# Patient Record
Sex: MALE | Race: WHITE | NOT HISPANIC OR LATINO | Employment: OTHER | URBAN - METROPOLITAN AREA
[De-identification: names, ages, dates, MRNs, and addresses within clinical notes are randomized per-mention and may not be internally consistent; named-entity substitution may affect disease eponyms.]

---

## 2023-03-01 LAB
INR IN PPP BY COAGULATION ASSAY: 2.9 (ref 0.9–1.1)
PROTHROMBIN TIME (PT) IN PPP BY COAGULATION ASSAY: 34.3 SEC (ref 9.8–13.4)

## 2023-04-20 LAB
ALBUMIN (G/DL) IN SER/PLAS: 3.8 G/DL (ref 3.4–5)
ANION GAP IN SER/PLAS: 11 MMOL/L (ref 10–20)
CALCIUM (MG/DL) IN SER/PLAS: 9 MG/DL (ref 8.6–10.3)
CARBON DIOXIDE, TOTAL (MMOL/L) IN SER/PLAS: 29 MMOL/L (ref 21–32)
CHLORIDE (MMOL/L) IN SER/PLAS: 103 MMOL/L (ref 98–107)
CREATININE (MG/DL) IN SER/PLAS: 1.12 MG/DL (ref 0.5–1.3)
FERRITIN (UG/LL) IN SER/PLAS: 20 UG/L (ref 20–300)
GFR MALE: 65 ML/MIN/1.73M2
GLUCOSE (MG/DL) IN SER/PLAS: 309 MG/DL (ref 74–99)
IRON (UG/DL) IN SER/PLAS: 82 UG/DL (ref 35–150)
IRON BINDING CAPACITY (UG/DL) IN SER/PLAS: 372 UG/DL (ref 240–445)
IRON SATURATION (%) IN SER/PLAS: 22 % (ref 25–45)
MAGNESIUM (MG/DL) IN SER/PLAS: 1.57 MG/DL (ref 1.6–2.4)
PARATHYRIN INTACT (PG/ML) IN SER/PLAS: 27.2 PG/ML (ref 18.5–88)
PHOSPHATE (MG/DL) IN SER/PLAS: 3.5 MG/DL (ref 2.5–4.9)
POTASSIUM (MMOL/L) IN SER/PLAS: 4.8 MMOL/L (ref 3.5–5.3)
SODIUM (MMOL/L) IN SER/PLAS: 138 MMOL/L (ref 136–145)
UREA NITROGEN (MG/DL) IN SER/PLAS: 22 MG/DL (ref 6–23)

## 2023-06-01 PROBLEM — K52.9 CHRONIC DIARRHEA OF UNKNOWN ORIGIN: Status: ACTIVE | Noted: 2023-06-01

## 2023-06-01 PROBLEM — E78.5 HYPERLIPIDEMIA: Status: ACTIVE | Noted: 2023-06-01

## 2023-06-01 PROBLEM — R21 SCROTAL RASH: Status: RESOLVED | Noted: 2023-06-01 | Resolved: 2023-06-01

## 2023-06-01 PROBLEM — I10 BENIGN ESSENTIAL HYPERTENSION: Status: ACTIVE | Noted: 2023-06-01

## 2023-06-01 PROBLEM — R63.4 WEIGHT LOSS, UNINTENTIONAL: Status: RESOLVED | Noted: 2023-06-01 | Resolved: 2023-06-01

## 2023-06-01 PROBLEM — E61.2 MAGNESIUM DEFICIENCY: Status: ACTIVE | Noted: 2023-06-01

## 2023-06-01 PROBLEM — H11.30 SUBCONJUNCTIVAL HEMORRHAGE: Status: RESOLVED | Noted: 2023-06-01 | Resolved: 2023-06-01

## 2023-06-01 PROBLEM — R42 LIGHTHEADEDNESS: Status: RESOLVED | Noted: 2023-06-01 | Resolved: 2023-06-01

## 2023-06-01 PROBLEM — I48.19 PERSISTENT ATRIAL FIBRILLATION (MULTI): Status: ACTIVE | Noted: 2023-06-01

## 2023-06-01 PROBLEM — N39.0 UTI (URINARY TRACT INFECTION): Status: RESOLVED | Noted: 2023-06-01 | Resolved: 2023-06-01

## 2023-06-01 PROBLEM — Q45.3 ABNORMALITY OF PANCREATIC DUCT: Status: ACTIVE | Noted: 2023-06-01

## 2023-06-01 PROBLEM — I44.2: Status: ACTIVE | Noted: 2023-06-01

## 2023-06-01 PROBLEM — E55.9 VITAMIN D DEFICIENCY: Status: ACTIVE | Noted: 2023-06-01

## 2023-06-01 PROBLEM — R94.31 ABNORMAL EKG: Status: ACTIVE | Noted: 2023-06-01

## 2023-06-01 PROBLEM — R07.9 CHEST PAIN: Status: RESOLVED | Noted: 2023-06-01 | Resolved: 2023-06-01

## 2023-06-01 PROBLEM — D64.9 ANEMIA: Status: ACTIVE | Noted: 2023-06-01

## 2023-06-01 PROBLEM — N35.919 URETHRAL STRICTURE: Status: ACTIVE | Noted: 2023-06-01

## 2023-06-01 PROBLEM — I44.1 WENCKEBACH: Status: ACTIVE | Noted: 2023-06-01

## 2023-06-01 PROBLEM — N40.0 BPH (BENIGN PROSTATIC HYPERPLASIA): Status: ACTIVE | Noted: 2023-06-01

## 2023-06-01 PROBLEM — I48.0 PAROXYSMAL ATRIAL FIBRILLATION (MULTI): Status: ACTIVE | Noted: 2023-06-01

## 2023-06-01 PROBLEM — R00.1 BRADYCARDIA: Status: ACTIVE | Noted: 2023-06-01

## 2023-06-01 LAB
ALANINE AMINOTRANSFERASE (SGPT) (U/L) IN SER/PLAS: 23 U/L (ref 10–52)
ALBUMIN (G/DL) IN SER/PLAS: 4.2 G/DL (ref 3.4–5)
ALKALINE PHOSPHATASE (U/L) IN SER/PLAS: 40 U/L (ref 33–136)
ANION GAP IN SER/PLAS: 13 MMOL/L (ref 10–20)
ASPARTATE AMINOTRANSFERASE (SGOT) (U/L) IN SER/PLAS: 22 U/L (ref 9–39)
BASOPHILS (10*3/UL) IN BLOOD BY AUTOMATED COUNT: 0.06 X10E9/L (ref 0–0.1)
BASOPHILS/100 LEUKOCYTES IN BLOOD BY AUTOMATED COUNT: 0.7 % (ref 0–2)
BILIRUBIN TOTAL (MG/DL) IN SER/PLAS: 0.7 MG/DL (ref 0–1.2)
CALCIDIOL (25 OH VITAMIN D3) (NG/ML) IN SER/PLAS: 90 NG/ML
CALCIUM (MG/DL) IN SER/PLAS: 9.4 MG/DL (ref 8.6–10.3)
CARBON DIOXIDE, TOTAL (MMOL/L) IN SER/PLAS: 28 MMOL/L (ref 21–32)
CHLORIDE (MMOL/L) IN SER/PLAS: 102 MMOL/L (ref 98–107)
CHOLESTEROL (MG/DL) IN SER/PLAS: 114 MG/DL (ref 0–199)
CHOLESTEROL IN HDL (MG/DL) IN SER/PLAS: 62.2 MG/DL
CHOLESTEROL/HDL RATIO: 1.8
CREATININE (MG/DL) IN SER/PLAS: 1.36 MG/DL (ref 0.5–1.3)
EOSINOPHILS (10*3/UL) IN BLOOD BY AUTOMATED COUNT: 0.22 X10E9/L (ref 0–0.4)
EOSINOPHILS/100 LEUKOCYTES IN BLOOD BY AUTOMATED COUNT: 2.6 % (ref 0–6)
ERYTHROCYTE DISTRIBUTION WIDTH (RATIO) BY AUTOMATED COUNT: 13.9 % (ref 11.5–14.5)
ERYTHROCYTE MEAN CORPUSCULAR HEMOGLOBIN CONCENTRATION (G/DL) BY AUTOMATED: 31.2 G/DL (ref 32–36)
ERYTHROCYTE MEAN CORPUSCULAR VOLUME (FL) BY AUTOMATED COUNT: 93 FL (ref 80–100)
ERYTHROCYTES (10*6/UL) IN BLOOD BY AUTOMATED COUNT: 4.46 X10E12/L (ref 4.5–5.9)
ESTIMATED AVERAGE GLUCOSE FOR HBA1C: 169 MG/DL
GFR MALE: 51 ML/MIN/1.73M2
GLUCOSE (MG/DL) IN SER/PLAS: 117 MG/DL (ref 74–99)
HEMATOCRIT (%) IN BLOOD BY AUTOMATED COUNT: 41.3 % (ref 41–52)
HEMOGLOBIN (G/DL) IN BLOOD: 12.9 G/DL (ref 13.5–17.5)
HEMOGLOBIN A1C/HEMOGLOBIN TOTAL IN BLOOD: 7.5 %
IMMATURE GRANULOCYTES/100 LEUKOCYTES IN BLOOD BY AUTOMATED COUNT: 0.4 % (ref 0–0.9)
LDL: 31 MG/DL (ref 0–99)
LEUKOCYTES (10*3/UL) IN BLOOD BY AUTOMATED COUNT: 8.5 X10E9/L (ref 4.4–11.3)
LYMPHOCYTES (10*3/UL) IN BLOOD BY AUTOMATED COUNT: 1.95 X10E9/L (ref 0.8–3)
LYMPHOCYTES/100 LEUKOCYTES IN BLOOD BY AUTOMATED COUNT: 23 % (ref 13–44)
MAGNESIUM (MG/DL) IN SER/PLAS: 2.06 MG/DL (ref 1.6–2.4)
MONOCYTES (10*3/UL) IN BLOOD BY AUTOMATED COUNT: 0.86 X10E9/L (ref 0.05–0.8)
MONOCYTES/100 LEUKOCYTES IN BLOOD BY AUTOMATED COUNT: 10.1 % (ref 2–10)
NEUTROPHILS (10*3/UL) IN BLOOD BY AUTOMATED COUNT: 5.36 X10E9/L (ref 1.6–5.5)
NEUTROPHILS/100 LEUKOCYTES IN BLOOD BY AUTOMATED COUNT: 63.2 % (ref 40–80)
PLATELETS (10*3/UL) IN BLOOD AUTOMATED COUNT: 248 X10E9/L (ref 150–450)
POTASSIUM (MMOL/L) IN SER/PLAS: 4.8 MMOL/L (ref 3.5–5.3)
PROTEIN TOTAL: 6.8 G/DL (ref 6.4–8.2)
SODIUM (MMOL/L) IN SER/PLAS: 138 MMOL/L (ref 136–145)
TRIGLYCERIDE (MG/DL) IN SER/PLAS: 105 MG/DL (ref 0–149)
UREA NITROGEN (MG/DL) IN SER/PLAS: 27 MG/DL (ref 6–23)
VLDL: 21 MG/DL (ref 0–40)

## 2023-06-01 RX ORDER — AMLODIPINE BESYLATE 2.5 MG/1
1 TABLET ORAL DAILY
COMMUNITY
Start: 2018-11-29

## 2023-06-01 RX ORDER — UBIDECARENONE 75 MG
1 CAPSULE ORAL DAILY
COMMUNITY
Start: 2017-11-08

## 2023-06-01 RX ORDER — TRIAMCINOLONE ACETONIDE 1 MG/G
1 CREAM TOPICAL 2 TIMES DAILY
COMMUNITY
Start: 2023-03-16

## 2023-06-01 RX ORDER — WARFARIN SODIUM 5 MG/1
5 TABLET ORAL NIGHTLY
COMMUNITY
Start: 2015-06-22 | End: 2023-11-30

## 2023-06-01 RX ORDER — CHOLECALCIFEROL (VITAMIN D3) 25 MCG
3 TABLET ORAL DAILY
COMMUNITY
Start: 2018-11-29

## 2023-06-01 RX ORDER — PEN NEEDLE, DIABETIC 30 GX3/16"
1 NEEDLE, DISPOSABLE MISCELLANEOUS 2 TIMES DAILY
COMMUNITY

## 2023-06-01 RX ORDER — LANOLIN ALCOHOL/MO/W.PET/CERES
1 CREAM (GRAM) TOPICAL DAILY
COMMUNITY
Start: 2018-01-08 | End: 2024-02-26 | Stop reason: DRUGHIGH

## 2023-06-01 RX ORDER — LOSARTAN POTASSIUM 100 MG/1
1 TABLET ORAL DAILY
COMMUNITY
Start: 2015-11-05 | End: 2023-10-04 | Stop reason: SDUPTHER

## 2023-06-01 RX ORDER — NYSTATIN 100000 U/G
CREAM TOPICAL 2 TIMES DAILY
COMMUNITY
Start: 2023-03-16

## 2023-06-01 RX ORDER — METFORMIN HYDROCHLORIDE 500 MG/1
1 TABLET ORAL 4 TIMES DAILY
COMMUNITY
Start: 2015-11-05 | End: 2024-02-14 | Stop reason: SDUPTHER

## 2023-06-01 RX ORDER — ACETAMINOPHEN 500 MG
500 TABLET ORAL 4 TIMES DAILY
COMMUNITY
Start: 2016-11-02

## 2023-06-01 RX ORDER — INSULIN LISPRO 100 [IU]/ML
INJECTION, SUSPENSION SUBCUTANEOUS
COMMUNITY
Start: 2016-10-18 | End: 2024-04-16 | Stop reason: SDUPTHER

## 2023-06-01 RX ORDER — AMILORIDE HYDROCHLORIDE 5 MG/1
1 TABLET ORAL DAILY
COMMUNITY
Start: 2018-11-29

## 2023-06-01 RX ORDER — ROSUVASTATIN CALCIUM 5 MG/1
1 TABLET, COATED ORAL DAILY
COMMUNITY
Start: 2023-05-19 | End: 2024-02-21

## 2023-06-01 RX ORDER — BLOOD SUGAR DIAGNOSTIC
1 STRIP MISCELLANEOUS 4 TIMES DAILY
COMMUNITY
Start: 2018-11-25 | End: 2024-02-08 | Stop reason: SDUPTHER

## 2023-06-05 ENCOUNTER — OFFICE VISIT (OUTPATIENT)
Dept: PRIMARY CARE | Facility: CLINIC | Age: 83
End: 2023-06-05
Payer: MEDICARE

## 2023-06-05 VITALS
HEIGHT: 72 IN | RESPIRATION RATE: 16 BRPM | HEART RATE: 69 BPM | WEIGHT: 171.6 LBS | DIASTOLIC BLOOD PRESSURE: 60 MMHG | OXYGEN SATURATION: 96 % | SYSTOLIC BLOOD PRESSURE: 120 MMHG | BODY MASS INDEX: 23.24 KG/M2

## 2023-06-05 DIAGNOSIS — Z23 NEED FOR TDAP VACCINATION: ICD-10-CM

## 2023-06-05 DIAGNOSIS — I48.19 PERSISTENT ATRIAL FIBRILLATION (MULTI): ICD-10-CM

## 2023-06-05 DIAGNOSIS — H93.92 LESION OF LEFT EAR: ICD-10-CM

## 2023-06-05 DIAGNOSIS — Z00.00 ROUTINE GENERAL MEDICAL EXAMINATION AT HEALTH CARE FACILITY: Primary | ICD-10-CM

## 2023-06-05 DIAGNOSIS — Z79.4 TYPE 2 DIABETES MELLITUS WITHOUT COMPLICATION, WITH LONG-TERM CURRENT USE OF INSULIN (MULTI): ICD-10-CM

## 2023-06-05 DIAGNOSIS — E11.9 TYPE 2 DIABETES MELLITUS WITHOUT COMPLICATION, WITH LONG-TERM CURRENT USE OF INSULIN (MULTI): ICD-10-CM

## 2023-06-05 DIAGNOSIS — B35.1 TOENAIL FUNGUS: ICD-10-CM

## 2023-06-05 PROCEDURE — 90715 TDAP VACCINE 7 YRS/> IM: CPT | Performed by: STUDENT IN AN ORGANIZED HEALTH CARE EDUCATION/TRAINING PROGRAM

## 2023-06-05 PROCEDURE — 3078F DIAST BP <80 MM HG: CPT | Performed by: STUDENT IN AN ORGANIZED HEALTH CARE EDUCATION/TRAINING PROGRAM

## 2023-06-05 PROCEDURE — 1170F FXNL STATUS ASSESSED: CPT | Performed by: STUDENT IN AN ORGANIZED HEALTH CARE EDUCATION/TRAINING PROGRAM

## 2023-06-05 PROCEDURE — 90471 IMMUNIZATION ADMIN: CPT | Performed by: STUDENT IN AN ORGANIZED HEALTH CARE EDUCATION/TRAINING PROGRAM

## 2023-06-05 PROCEDURE — 1159F MED LIST DOCD IN RCRD: CPT | Performed by: STUDENT IN AN ORGANIZED HEALTH CARE EDUCATION/TRAINING PROGRAM

## 2023-06-05 PROCEDURE — 99213 OFFICE O/P EST LOW 20 MIN: CPT | Performed by: STUDENT IN AN ORGANIZED HEALTH CARE EDUCATION/TRAINING PROGRAM

## 2023-06-05 PROCEDURE — 1160F RVW MEDS BY RX/DR IN RCRD: CPT | Performed by: STUDENT IN AN ORGANIZED HEALTH CARE EDUCATION/TRAINING PROGRAM

## 2023-06-05 PROCEDURE — 1036F TOBACCO NON-USER: CPT | Performed by: STUDENT IN AN ORGANIZED HEALTH CARE EDUCATION/TRAINING PROGRAM

## 2023-06-05 PROCEDURE — 1157F ADVNC CARE PLAN IN RCRD: CPT | Performed by: STUDENT IN AN ORGANIZED HEALTH CARE EDUCATION/TRAINING PROGRAM

## 2023-06-05 PROCEDURE — 3074F SYST BP LT 130 MM HG: CPT | Performed by: STUDENT IN AN ORGANIZED HEALTH CARE EDUCATION/TRAINING PROGRAM

## 2023-06-05 PROCEDURE — G0438 PPPS, INITIAL VISIT: HCPCS | Performed by: STUDENT IN AN ORGANIZED HEALTH CARE EDUCATION/TRAINING PROGRAM

## 2023-06-05 RX ORDER — EMPAGLIFLOZIN 10 MG/1
10 TABLET, FILM COATED ORAL DAILY
COMMUNITY
Start: 2023-05-29

## 2023-06-05 ASSESSMENT — ENCOUNTER SYMPTOMS
HEMATURIA: 0
DEPRESSION: 0
LOSS OF SENSATION IN FEET: 0
WEAKNESS: 0
DIZZINESS: 0
CHILLS: 0
HEADACHES: 0
POLYPHAGIA: 0
ABDOMINAL PAIN: 0
WOUND: 0
BLOOD IN STOOL: 0
NAUSEA: 0
POLYDIPSIA: 0
FATIGUE: 0
ABDOMINAL DISTENTION: 0
TROUBLE SWALLOWING: 0
SLEEP DISTURBANCE: 0
SINUS PAIN: 0
EYE DISCHARGE: 0
OCCASIONAL FEELINGS OF UNSTEADINESS: 0
SHORTNESS OF BREATH: 0
FEVER: 0
CONFUSION: 0
NERVOUS/ANXIOUS: 0
COUGH: 0
CONSTIPATION: 0
WHEEZING: 0
ACTIVITY CHANGE: 0

## 2023-06-05 ASSESSMENT — PATIENT HEALTH QUESTIONNAIRE - PHQ9
3. TROUBLE FALLING OR STAYING ASLEEP OR SLEEPING TOO MUCH: NOT AT ALL
5. POOR APPETITE OR OVEREATING: NOT AT ALL
SUM OF ALL RESPONSES TO PHQ9 QUESTIONS 1 AND 2: 0
7. TROUBLE CONCENTRATING ON THINGS, SUCH AS READING THE NEWSPAPER OR WATCHING TELEVISION: NOT AT ALL
4. FEELING TIRED OR HAVING LITTLE ENERGY: NOT AT ALL
6. FEELING BAD ABOUT YOURSELF - OR THAT YOU ARE A FAILURE OR HAVE LET YOURSELF OR YOUR FAMILY DOWN: NOT AT ALL
2. FEELING DOWN, DEPRESSED OR HOPELESS: NOT AT ALL
SUM OF ALL RESPONSES TO PHQ QUESTIONS 1-9: 0
1. LITTLE INTEREST OR PLEASURE IN DOING THINGS: NOT AT ALL
8. MOVING OR SPEAKING SO SLOWLY THAT OTHER PEOPLE COULD HAVE NOTICED. OR THE OPPOSITE, BEING SO FIGETY OR RESTLESS THAT YOU HAVE BEEN MOVING AROUND A LOT MORE THAN USUAL: NOT AT ALL
9. THOUGHTS THAT YOU WOULD BE BETTER OFF DEAD, OR OF HURTING YOURSELF: NOT AT ALL
10. IF YOU CHECKED OFF ANY PROBLEMS, HOW DIFFICULT HAVE THESE PROBLEMS MADE IT FOR YOU TO DO YOUR WORK, TAKE CARE OF THINGS AT HOME, OR GET ALONG WITH OTHER PEOPLE: NOT DIFFICULT AT ALL

## 2023-06-05 ASSESSMENT — ACTIVITIES OF DAILY LIVING (ADL)
TAKING_MEDICATION: INDEPENDENT
DOING_HOUSEWORK: INDEPENDENT
MANAGING_FINANCES: INDEPENDENT
BATHING: INDEPENDENT
GROCERY_SHOPPING: INDEPENDENT
DRESSING: INDEPENDENT

## 2023-06-05 ASSESSMENT — ANXIETY QUESTIONNAIRES
3. WORRYING TOO MUCH ABOUT DIFFERENT THINGS: NOT AT ALL
4. TROUBLE RELAXING: NOT AT ALL
5. BEING SO RESTLESS THAT IT IS HARD TO SIT STILL: NOT AT ALL
1. FEELING NERVOUS, ANXIOUS, OR ON EDGE: NOT AT ALL
2. NOT BEING ABLE TO STOP OR CONTROL WORRYING: NOT AT ALL
IF YOU CHECKED OFF ANY PROBLEMS ON THIS QUESTIONNAIRE, HOW DIFFICULT HAVE THESE PROBLEMS MADE IT FOR YOU TO DO YOUR WORK, TAKE CARE OF THINGS AT HOME, OR GET ALONG WITH OTHER PEOPLE: NOT DIFFICULT AT ALL
6. BECOMING EASILY ANNOYED OR IRRITABLE: NOT AT ALL
GAD7 TOTAL SCORE: 0
7. FEELING AFRAID AS IF SOMETHING AWFUL MIGHT HAPPEN: NOT AT ALL

## 2023-06-05 NOTE — PATIENT INSTRUCTIONS
It was nice meeting you today.      Exercise helps improve your health: I encourage you to slowly increase your activity level 10 -30 min as tolerated 3-5 times per week.     Diet: You can improve your health with low carbohydrate, low-fat and high-fiber diet.     DASH diet can help improve your blood pressure and overall health.    You can sign up for [employer] to view your result as well     If you need anything or have any questions, please call the clinic at [phone]     Follow up as scheduled

## 2023-06-05 NOTE — PROGRESS NOTES
"Subjective :  Chief Complaint: Jonah Lauren is an 82 y.o. male here for an annual Medicare Wellness visit.    Patient otherwise feels well. No other complaints or concerns.    I have reviewed and reconciled the medication list with the patient today.    Current Outpatient Medications:     acetaminophen (Tylenol) 500 mg tablet, Take 1 tablet (500 mg) by mouth 4 times a day., Disp: , Rfl:     aMILoride (Midamor) 5 mg tablet, Take 1 tablet (5 mg) by mouth once daily., Disp: , Rfl:     amLODIPine (Norvasc) 2.5 mg tablet, Take 1 tablet (2.5 mg) by mouth once daily., Disp: , Rfl:     blood sugar diagnostic (Accu-Chek Barbara Plus test strp) strip, Apply 1 each topically 4 times a day. Patient is to check blood sugar 4 times a day, Disp: , Rfl:     cholecalciferol (Vitamin D-3) 25 MCG (1000 UT) tablet, Take 3 tablets (75 mcg) by mouth once daily., Disp: , Rfl:     cyanocobalamin (Vitamin B-12) 500 mcg tablet, Take 1 tablet (500 mcg) by mouth once daily., Disp: , Rfl:     insulin lispro protamin-lispro (HumaLOG Mix 75-25) 100 unit/mL (75-25) injection, Inject under the skin., Disp: , Rfl:     Jardiance 10 mg, Take 1 tablet (10 mg) by mouth once daily., Disp: , Rfl:     losartan (Cozaar) 100 mg tablet, Take 1 tablet (100 mg) by mouth once daily., Disp: , Rfl:     magnesium oxide (Mag-Ox) 400 mg (241.3 mg magnesium) tablet, Take 1 tablet (400 mg) by mouth once daily., Disp: , Rfl:     metFORMIN (Glucophage) 500 mg tablet, Take 1 tablet (500 mg) by mouth 4 times a day., Disp: , Rfl:     pen needle, diabetic 32 gauge x 5/32\" needle, 1 each 2 times a day., Disp: , Rfl:     rosuvastatin (Crestor) 5 mg tablet, Take 1 tablet (5 mg) by mouth once daily., Disp: , Rfl:     warfarin (Coumadin) 5 mg tablet, Take 1 tablet (5 mg) by mouth once daily at bedtime., Disp: , Rfl:     nystatin (Mycostatin) cream, twice a day. APPLY A THIN LAYER TO AFFECTED AREA(S) AND RUB IN WELL TWICE DAILY., Disp: , Rfl:     triamcinolone (Kenalog) 0.1 % " cream, Apply 1 Application topically 2 times a day., Disp: , Rfl:     The patient's relevant past medical, surgical, family and social history was reviewed in [facility].  All pertinent lab work and results for this visit were reviewed with patient.    Review of Systems   Constitutional:  Negative for activity change, chills, fatigue and fever.   HENT:  Negative for congestion, ear discharge, sinus pain and trouble swallowing.    Eyes:  Negative for discharge and visual disturbance.   Respiratory:  Negative for cough, shortness of breath and wheezing.    Cardiovascular:  Negative for chest pain and leg swelling.   Gastrointestinal:  Negative for abdominal distention, abdominal pain, blood in stool, constipation and nausea.   Endocrine: Negative for polydipsia and polyphagia.   Genitourinary:  Negative for hematuria.   Musculoskeletal:  Negative for gait problem.   Skin:  Negative for wound.   Allergic/Immunologic: Negative for food allergies.   Neurological:  Negative for dizziness, weakness and headaches.   Psychiatric/Behavioral:  Negative for confusion, sleep disturbance and suicidal ideas. The patient is not nervous/anxious.       A complete review of systems was performed and all systems were normal except what is noted in the HPI.      List of current healthcare providers:  Patient Care Team:  Aaron Peralta DO as PCP - General  [facility]eral[facility]        Over the past 2 weeks, how often have you been bothered by any of the following problems?  Little interest or pleasure in doing things: Not at all  Feeling down, depressed, or hopeless: Not at all  Patient Health Questionnaire-2 Score: 0  Over the past 2 weeks, how often have you been bothered by any of the following problems?  Trouble falling or staying asleep, or sleeping too much: Not at all  Feeling tired or having little energy: Not at all  Poor appetite or overeating: Not at all  Feeling bad about yourself - or that you are a  failure or have let yourself or your family down: Not at all  Trouble concentrating on things, such as reading the newspaper or watching television: Not at all  Moving or speaking so slowly that other people could have noticed? Or the opposite - being so fidgety or restless that you have been moving around a lot more than usual.: Not at all  Thoughts that you would be better off dead or hurting yourself in some way: Not at all  Patient Health Questionnaire-9 Score: 0             Objective :  /60 (BP Location: Right arm, Patient Position: Sitting, BP Cuff Size: Adult)   Pulse 69   Resp 16   Ht 1.829 m (6')   Wt 77.8 kg (171 lb 9.6 oz)   SpO2 96%   BMI 23.27 kg/m²    No results found.  Physical Exam  Vitals and nursing note reviewed.   Constitutional:       Appearance: Normal appearance. He is normal weight.   HENT:      Head: Normocephalic and atraumatic.      Right Ear: Tympanic membrane normal.      Left Ear: Tympanic membrane normal.      Ears:      Comments: Left ear lobe lesion     Mouth/Throat:      Mouth: Mucous membranes are dry.   Eyes:      Extraocular Movements: Extraocular movements intact.      Conjunctiva/sclera: Conjunctivae normal.   Cardiovascular:      Rate and Rhythm: Normal rate.      Pulses: Normal pulses.   Pulmonary:      Effort: Pulmonary effort is normal. No respiratory distress.      Breath sounds: Normal breath sounds.   Abdominal:      General: Bowel sounds are normal. There is no distension.      Palpations: Abdomen is soft. There is no mass.   Musculoskeletal:         General: Normal range of motion.      Cervical back: Normal range of motion and neck supple.   Skin:     General: Skin is warm and dry.   Neurological:      General: No focal deficit present.      Mental Status: He is alert. Mental status is at baseline.   Psychiatric:         Mood and Affect: Mood normal.         Assessment/Plan :  Medicare wellness.  Reports issues with weight loss.  Has good appetite and has  access to food.  He is on chronic magnesium oxide.  Has intermittent diarrhea that resolved with Imodium with no issues.  He received tetanus shot today.    DM2.  His A1c was 7.5%.  He is on chronic insulin therapy, also on metformin and was recently started on Jardiance by nephrology.  We discussed that he is on too much antidiabetic medication.  Will refer him to endocrinology for recommendation. Needs to be weaned off insulin    Ear lobe lesion. Referred him dermatology     Toe fungus. Referred to dermatology and podiatry     Problem List Items Addressed This Visit       Persistent atrial fibrillation (CMS/HCC)    Relevant Orders    Follow Up In [facility] - PCP     Other Visit Diagnoses       Routine general medical examination at health care facility    -  Primary    Relevant Orders    Follow Up In [facility] - PCP    Lesion of left ear        Relevant Orders    Referral to Dermatology    Follow Up In [facility] - PCP    Toenail fungus        Relevant Orders    Referral to Dermatology    Referral to Podiatry    Follow Up In [facility] - PCP    Type 2 diabetes mellitus without complication, with long-term current use of insulin (CMS/HCC)        Relevant Orders    Referral to Endocrinology    Follow Up In [facility] - PCP    Need for Tdap vaccination        Relevant Orders    Tdap vaccine, age 10 years and older (BOOSTRIX) (Completed)               The following health maintenance schedule was reviewed with the patient and provided in printed form in the after visit summary:  Health Maintenance   Topic Date Due    Medicare Annual Wellness Visit (AWV)  Never done    Diabetes: Foot Exam  Never done    Diabetes: Retinopathy Screening  Never done    Diabetes: Urine Protein Screening  Never done    Zoster Vaccines (1 of 2) Never done    COVID-19 Vaccine (5 - Booster for Moderna series) 06/07/2022    Diabetes: Hemoglobin A1C  09/01/2023    Influenza Vaccine (Season  Ended) 09/01/2023    Lipid Panel  06/01/2024    DTaP/Tdap/Td Vaccines (2 - Td or Tdap) 06/05/2033    Pneumococcal Vaccine: 65+ Years  Completed    HIB Vaccines  Aged Out    Hepatitis B Vaccines  Aged Out    IPV Vaccines  Aged Out    Hepatitis A Vaccines  Aged Out    Meningococcal Vaccine  Aged Out    Rotavirus Vaccines  Aged Out    HPV Vaccines  Aged Out    Irritable Bowel Syndrome  Discontinued           Patient understands and agrees with treatment plan.          Aaron Peralta, DO

## 2023-09-14 LAB
INR IN PPP BY COAGULATION ASSAY EXTERNAL: 2.6
PROTHROMBIN TIME (PT) IN PPP BY COAGULATION ASSAY EXTERNAL: NORMAL SECONDS

## 2023-10-04 DIAGNOSIS — I10 BENIGN HYPERTENSION: Primary | ICD-10-CM

## 2023-10-04 RX ORDER — LOSARTAN POTASSIUM 100 MG/1
100 TABLET ORAL DAILY
Qty: 90 TABLET | Refills: 3 | Status: SHIPPED | OUTPATIENT
Start: 2023-10-04 | End: 2024-02-26 | Stop reason: SDUPTHER

## 2023-10-12 ENCOUNTER — ANTICOAGULATION - WARFARIN VISIT (OUTPATIENT)
Dept: PHARMACY | Facility: HOSPITAL | Age: 83
End: 2023-10-12
Payer: MEDICARE

## 2023-10-12 DIAGNOSIS — I48.19 PERSISTENT ATRIAL FIBRILLATION (MULTI): ICD-10-CM

## 2023-10-12 DIAGNOSIS — I48.0 PAROXYSMAL ATRIAL FIBRILLATION (MULTI): Primary | ICD-10-CM

## 2023-10-12 LAB
POC INR: 3 (ref 2–3)
POC PROTHROMBIN TIME: 35.7

## 2023-10-12 PROCEDURE — 85610 PROTHROMBIN TIME: CPT

## 2023-10-12 NOTE — PATIENT INSTRUCTIONS
Your INR today is in range at 3.0. However since it is on the high end, will have you decrease your dose to 5 mg every day. We will follow up in 3 weeks. If any questions arise do not hesitate to call us at [phone] M-F from 8:30am-4:30pm or at [phone] after 5pm or on the weekends. Continue to monitor for any excess bleeding or bruising, especially for blood in your stool.

## 2023-10-12 NOTE — PROGRESS NOTES
Mr. Lauren is a referral from Dr. Morgan for warfarin management of Afib (goal 2-3). He denies any changes in health. He denies any missed or extra doses. No signs or symptoms of bleeding reported. He previously was very stable on 7.5 mg Saturday and 5 mg all other days. No changes in his diet or his other medications. Last visit he did bring in a medication that has been using for a few month that he uses for leg cramps. It is [employer] brand Leg Cramp PM, which is a quinine derivative product and it was discussed that this may increase the risk of bleeding with warfarin. Today his INR is 3.0, but since it is on the high end will have him decrease his dose to 5mg daily. We will follow up in 3 weeks.

## 2023-10-24 ENCOUNTER — LAB (OUTPATIENT)
Dept: LAB | Facility: LAB | Age: 83
End: 2023-10-24
Payer: MEDICARE

## 2023-10-24 ENCOUNTER — TELEPHONE (OUTPATIENT)
Dept: PRIMARY CARE | Facility: CLINIC | Age: 83
End: 2023-10-24

## 2023-10-24 DIAGNOSIS — N18.31 CHRONIC KIDNEY DISEASE, STAGE 3A (MULTI): Primary | ICD-10-CM

## 2023-10-24 DIAGNOSIS — N18.31 CHRONIC KIDNEY DISEASE, STAGE 3A (MULTI): ICD-10-CM

## 2023-10-24 DIAGNOSIS — E11.22 TYPE 2 DIABETES MELLITUS WITH DIABETIC CHRONIC KIDNEY DISEASE (MULTI): Primary | ICD-10-CM

## 2023-10-24 LAB
ALBUMIN SERPL BCP-MCNC: 4.1 G/DL (ref 3.4–5)
ANION GAP SERPL CALC-SCNC: 13 MMOL/L (ref 10–20)
APPEARANCE UR: CLEAR
BILIRUB UR STRIP.AUTO-MCNC: NEGATIVE MG/DL
BUN SERPL-MCNC: 25 MG/DL (ref 6–23)
CALCIUM SERPL-MCNC: 8.9 MG/DL (ref 8.6–10.3)
CHLORIDE SERPL-SCNC: 104 MMOL/L (ref 98–107)
CO2 SERPL-SCNC: 27 MMOL/L (ref 21–32)
COLOR UR: YELLOW
CREAT SERPL-MCNC: 1.22 MG/DL (ref 0.5–1.3)
CREAT UR-MCNC: 23.7 MG/DL (ref 20–370)
GFR SERPL CREATININE-BSD FRML MDRD: 58 ML/MIN/1.73M*2
GLUCOSE SERPL-MCNC: 137 MG/DL (ref 74–99)
GLUCOSE UR STRIP.AUTO-MCNC: ABNORMAL MG/DL
KETONES UR STRIP.AUTO-MCNC: NEGATIVE MG/DL
LEUKOCYTE ESTERASE UR QL STRIP.AUTO: NEGATIVE
NITRITE UR QL STRIP.AUTO: NEGATIVE
PH UR STRIP.AUTO: 7 [PH]
PHOSPHATE SERPL-MCNC: 4 MG/DL (ref 2.5–4.9)
POTASSIUM SERPL-SCNC: 5.1 MMOL/L (ref 3.5–5.3)
PROT UR STRIP.AUTO-MCNC: NEGATIVE MG/DL
PROT UR-ACNC: <4 MG/DL (ref 5–25)
PROT/CREAT UR: ABNORMAL MG/G{CREAT}
RBC # UR STRIP.AUTO: NEGATIVE /UL
SODIUM SERPL-SCNC: 139 MMOL/L (ref 136–145)
SP GR UR STRIP.AUTO: 1.01
UROBILINOGEN UR STRIP.AUTO-MCNC: <2 MG/DL

## 2023-10-24 PROCEDURE — 82570 ASSAY OF URINE CREATININE: CPT

## 2023-10-24 PROCEDURE — 80069 RENAL FUNCTION PANEL: CPT

## 2023-10-24 PROCEDURE — 84156 ASSAY OF PROTEIN URINE: CPT

## 2023-10-24 PROCEDURE — 83036 HEMOGLOBIN GLYCOSYLATED A1C: CPT

## 2023-10-24 PROCEDURE — 36415 COLL VENOUS BLD VENIPUNCTURE: CPT

## 2023-10-24 PROCEDURE — 81003 URINALYSIS AUTO W/O SCOPE: CPT

## 2023-10-24 NOTE — TELEPHONE ENCOUNTER
Former [facility] patient he is schedule with you in April and he was in the urgent in [place] and he hurt his left leg and the gave him a muscle relaxer and he is coming for lab work and would like to know if you put in a order for a xray. Please call him if we put a order in at [phone].

## 2023-10-24 NOTE — TELEPHONE ENCOUNTER
I spoke with patient wife let her know you were out of office on Tuesdays and I would send you the message she said she would let the patient know.

## 2023-10-25 LAB
EST. AVERAGE GLUCOSE BLD GHB EST-MCNC: 163 MG/DL
HBA1C MFR BLD: 7.3 %

## 2023-10-25 NOTE — TELEPHONE ENCOUNTER
Where on his leg is it hurting? I think this probably requires an appointment. I cannot see any notes from urgent care.

## 2023-11-02 ENCOUNTER — ANTICOAGULATION - WARFARIN VISIT (OUTPATIENT)
Dept: PHARMACY | Facility: HOSPITAL | Age: 83
End: 2023-11-02
Payer: MEDICARE

## 2023-11-02 DIAGNOSIS — I48.0 PAROXYSMAL ATRIAL FIBRILLATION (MULTI): Primary | ICD-10-CM

## 2023-11-02 DIAGNOSIS — I48.19 PERSISTENT ATRIAL FIBRILLATION (MULTI): ICD-10-CM

## 2023-11-02 LAB
POC INR: 2.4 (ref 2–3)
POC PROTHROMBIN TIME: 28.6

## 2023-11-02 PROCEDURE — 85610 PROTHROMBIN TIME: CPT | Mod: QW

## 2023-11-02 NOTE — PROGRESS NOTES
Mr. Lauren is a referral from Dr. Morgan for warfarin management of Afib (goal 2-3). He denies any changes in health. He denies any missed or extra doses. No signs or symptoms of bleeding reported. He does say he bruises very easily. He previously was very stable on 7.5 mg Saturday and 5 mg all other days. No changes in his diet or his other medications. Last week he strained his calf muscle and he went to the urgent care in [place]. The urgent care prescribed him a lidocaine patch and cyclobenzaprine. His last dose of cyclobenzaprine was this past Monday. He said the pain is much better now. For his calf pain he said he still takes the [employer] brand Leg Cramp PM, which is a quinine derivative product and it was discussed that this may increase the risk of bleeding with warfarin. He took a dose of it this morning. Today his INR is 2.4, so we will have him continue taking his weekly regimen of 5mg daily. We will follow up in 4 weeks.

## 2023-11-02 NOTE — PATIENT INSTRUCTIONS
Your INR today is in range at 2.4. Since it is within range, will have you continue your dose to 5 mg every day. We will follow up in 4 weeks. If any questions arise do not hesitate to call us at [phone] M-F from 8:30am-4:30pm or at [phone] after 5pm or on the weekends. Continue to monitor for any excess bleeding or bruising, especially for blood in your stool.

## 2023-11-02 NOTE — Clinical Note
The PT/INR result was communicated to the patient during the clinic. No follow-up action needed by the PCP/Referring Provider at this time.

## 2023-11-15 ENCOUNTER — LAB (OUTPATIENT)
Dept: LAB | Facility: LAB | Age: 83
End: 2023-11-15
Payer: MEDICARE

## 2023-11-15 DIAGNOSIS — E78.5 HYPERLIPIDEMIA, UNSPECIFIED: Primary | ICD-10-CM

## 2023-11-15 LAB
CHOLEST SERPL-MCNC: 133 MG/DL (ref 0–199)
CHOLESTEROL/HDL RATIO: 2
HDLC SERPL-MCNC: 66.9 MG/DL
LDLC SERPL CALC-MCNC: 42 MG/DL
NON HDL CHOLESTEROL: 66 MG/DL (ref 0–149)
TRIGL SERPL-MCNC: 122 MG/DL (ref 0–149)
VLDL: 24 MG/DL (ref 0–40)

## 2023-11-15 PROCEDURE — 36415 COLL VENOUS BLD VENIPUNCTURE: CPT

## 2023-11-15 PROCEDURE — 80061 LIPID PANEL: CPT

## 2023-11-20 PROBLEM — Z79.01 WARFARIN ANTICOAGULATION: Status: ACTIVE | Noted: 2023-11-20

## 2023-11-20 NOTE — PROGRESS NOTES
[facility]    Patient Name: Jonah Lauren  Patient : 1940      Scribe Attestation  By signing my name below, IAn Scribe   attest that this documentation has been prepared under the direction and in the presence of Ludin Morgan DO.        Reason for visit:  This is an 82-year-old male here for follow-up regarding his history of persistent atrial fibrillation, warfarin anticoagulation managed through the anticoagulation clinic, accelerated idioventricular rhythm, mild to moderate mitral valve regurgitation and, hypertension, and dyslipidemia.     HPI:  This is an 82-year-old male here for follow-up regarding his history of persistent atrial fibrillation, warfarin anticoagulation managed through the anticoagulation clinic, accelerated idioventricular rhythm, mild to moderate mitral valve regurgitation and, hypertension, and dyslipidemia.  The patient was last evaluated by me in 2022.  At that visit I had referred him to the anticoagulation clinic and asked that he follow-up in 6 months.  Patient was subsequently seen by the cardiology PA most recently in May 2023 at which time atorvastatin was discontinued and he was started on rosuvastatin 5 mg daily.  Follow-up lipid panel was ordered for 6 months.  BMP done in 2023 showed normal serum sodium and potassium with a serum creatinine of 1.22, hemoglobin A1c was 7.3%.  CBC done in 2023 showed a hemoglobin of 12.9.  Lipid panel done in 2023 showed an LDL cholesterol of 42 and triglycerides of 122 while on rosuvastatin 5 mg daily. ECG done today showed atrial fibrillation with a heart rate of 66 bpm.  The patient reports dizziness/lightheadedness when quickly changing positions. He denies any new chest pain, shortness of breath, and palpitations. He states that he takes all of his medications as prescribed. During my exam, he was resting comfortably on the exam table.             Assessment/Plan:   1. Persistent atrial fibrillation  The patient has a history of persistent atrial fibrillation, on warfarin for thromboembolism prophylaxis.   Warfarin dosing and INR monitoring is being managed by the  anticoagulation clinic through the pharmacy.   He was previously on sotalol which was discontinued secondary to complaints of lightheadedness and fatigue in the setting of slow atrial fibrillation.   ECG done today showed atrial fibrillation with a heart rate of 66 bpm.    He denies chest pain, palpitations or lightheadedness.   Echocardiogram done 6/1/2021 showed normal left ventricular size and systolic function with an ejection fraction of 55 to 60%, normal right ventricular size and systolic function, and mild to moderate mitral valve regurgitation.  Recent lab works as noted in the HPI.  Echocardiogram was ordered as noted below.  Lab works as noted below will be done in 6 months prior to his next visit.  Follow up in 6 months and sooner if necessary.      2. Warfarin anticoagulation  The patient is on anticoagulation with warfarin for persistent atrial fibrillation.  Warfarin dosing and INR monitoring is being managed by the  anticoagulation clinic through the pharmacy.   Recent lab works as noted in the HPI.  Lab works as noted below will be done in 6 months prior to his next visit.     3. Accelerated idioventricular rhythm  The patient was noted to have multiple episodes of accelerated idioventricular rhythm on cardiac monitor.  ECG done today showed atrial fibrillation with a heart rate of 66 bpm.    He denies any anginal chest discomfort or palpitations.  Echocardiogram done 6/1/2021 showed normal left ventricular size and systolic function with an ejection fraction of 55 to 60%, normal right ventricular size and systolic function, and mild to moderate mitral valve regurgitation.  Echocardiogram was ordered as noted below.      4. Hypertension  Patient has a history of  hypertension which appears controlled on exam today.  He should continue his current antihypertensive medications.      5. Dyslipidemia  Lipid panel done in November 2023 showed an LDL cholesterol of 42 and triglycerides of 122 while on rosuvastatin 5 mg daily.   Please see lifestyle recommendations below.    6. Dizziness/lightheadedness  The patient reports dizziness/lightheadedness when quickly changing positions.  ECG done today showed atrial fibrillation with a heart rate of 66 bpm.    Blood pressure appears controlled on exam today.  Recent lab works as noted in the HPI.  Patient should follow general recommendations including staying well-hydrated, changing the positions slowly, wearing compression stockings as necessary, and routine exercise.       Orders:   Echocardiogram,   CMP/magnesium/CBC in 6 months,   Follow-up in 6 months.      Lifestyle Recommendations  I recommend a whole-food plant-based diet, an eating pattern that encourages the consumption of unrefined plant foods (such as fruits, vegetables, tubers, whole grains, legumes, nuts and seeds) and discourages meats, dairy products, eggs and processed foods.     The AHA/ACC recommends that the patient consume a dietary pattern that emphasizes intake of vegetables, fruits, and whole grains; includes low-fat dairy products, poultry, fish, legumes, non-tropical vegetable oils, and nuts; and limits intake of sodium, sweets, sugar-sweetened beverages, and red meats.  Adapt this dietary pattern to appropriate calorie requirements (a 500-750 kcal/day deficit to loose weight), personal and cultural food preferences, and nutrition therapy for other medical conditions (including diabetes).  Achieve this pattern by following plans such as the [place] Mediterranean, DASH dietary pattern, or AHA diet.     Engage in 2 hours and 30 minutes per week of moderate-intensity physical activity, or 1 hour and 15 minutes (75 minutes) per week of vigorous-intensity aerobic  physical activity, or an equivalent combination of moderate and vigorous-intensity aerobic physical activity. Aerobic activity should be performed in episodes of at least 10 minutes preferably spread throughout the week.     Adhering to a heart healthy diet, regular exercise habits, avoidance of tobacco products, and maintenance of a healthy weight are crucial components of their heart disease risk reduction.     Any positive review of systems not specifically addressed in the office visit today should be evaluated and treated by the patients primary care physician or in an emergency department if necessary     Patient was notified that results from ordered tests will be called to the patient if it changes current management; it will otherwise be discussed at a future appointment and available on  [employer].     Thank you for allowing me to participate in the care of this patient.        This document was generated using the assistance of voice recognition software. If there are any errors of spelling, grammar, syntax, or meaning; please feel free to contact me directly for clarification.    Past Medical History:  He has a past medical history of Deficiency of other specified B group vitamins, Hypomagnesemia (11/29/2018), Lightheadedness (06/01/2023), Personal history of other diseases of male genital organs (04/10/2019), Personal history of other diseases of the digestive system, Personal history of other endocrine, nutritional and metabolic disease, Personal history of urinary (tract) infections (12/23/2021), Scrotal rash (06/01/2023), and Weight loss, unintentional (06/01/2023).    Past Surgical History:  He has a past surgical history that includes Prostate surgery (10/18/2016) and Tonsillectomy (10/18/2016).      Social History:  He reports that he has never smoked. He has never used smokeless tobacco. He reports that he does not drink alcohol and does not use drugs.    Family History:  No family history on  "file.     Allergies:  Patient has no known allergies.    Outpatient Medications:  Current Outpatient Medications   Medication Instructions    acetaminophen (TYLENOL) 500 mg, oral, 4 times daily    aMILoride (Midamor) 5 mg tablet 1 tablet, oral, Daily    amLODIPine (Norvasc) 2.5 mg tablet 1 tablet, oral, Daily    blood sugar diagnostic (Accu-Chek Barbara Plus test strp) strip 1 each, Topical, 4 times daily, Patient is to check blood sugar 4 times a day    cholecalciferol (Vitamin D-3) 25 MCG (1000 UT) tablet 3 tablets, oral, Daily    cyanocobalamin (Vitamin B-12) 500 mcg tablet 1 tablet, oral, Daily    insulin lispro protamin-lispro (HumaLOG Mix 75-25) 100 unit/mL (75-25) injection subcutaneous    Jardiance 10 mg, oral, Daily    losartan (COZAAR) 100 mg, oral, Daily    magnesium oxide (Mag-Ox) 400 mg (241.3 mg magnesium) tablet 1 tablet, oral, Daily    metFORMIN (Glucophage) 500 mg tablet 1 tablet, oral, 4 times daily    nystatin (Mycostatin) cream 2 times daily, APPLY A THIN LAYER TO AFFECTED AREA(S) AND RUB IN WELL TWICE DAILY.    pen needle, diabetic 32 gauge x 5/32\" needle 1 each, miscellaneous, 2 times daily    rosuvastatin (Crestor) 5 mg tablet 1 tablet, oral, Daily    triamcinolone (Kenalog) 0.1 % cream 1 Application, Topical, 2 times daily    warfarin (COUMADIN) 5 mg, oral, Nightly        ROS:  A 14 point review of systems was done and is negative other than as stated in HPI    Vitals:      3/17/2022     1:23 PM 8/10/2022    11:14 AM 10/6/2022     1:22 PM 12/5/2022    11:13 AM 3/16/2023     1:45 PM 5/19/2023    12:57 PM 6/5/2023     9:37 AM   Vitals   Systolic 147 142 132 132 163 152 120   Diastolic 66 64 80 78 78 74 60   Heart Rate 69 70 56 62 54 57 69   Resp  16  16   16   Height (in) 1.803 m (5' 11\") 1.803 m (5' 11\") 1.803 m (5' 11\") 1.803 m (5' 11\")  1.803 m (5' 11\") 1.829 m (6')   Weight (lb) 179 170 172 175  172 171.6   BMI 24.97 kg/m2 23.71 kg/m2 23.99 kg/m2 24.41 kg/m2  23.99 kg/m2 23.27 kg/m2   BSA " (m2) 2.02 m2 1.97 m2 1.98 m2 1.99 m2  1.98 m2 1.99 m2        Physical Exam:   Constitutional: Cooperative, in no acute distress, alert, appears stated age.  Skin: Skin color, texture, turgor normal. No rashes or lesions.  Head: Normocephalic. No masses, lesions, tenderness or abnormalities  Eyes: Extraocular movements are grossly intact.  Mouth and throat: Mucous membranes moist  Neck: Neck supple, no carotid bruits, no JVD  Respiratory: Lungs clear to auscultation, no wheezing or rhonchi, no use of accessory muscles  Chest wall: No scars, normal excursion with respiration  Cardiovascular: Irregular, + murmur  Gastrointestinal: Abdomen soft, nontender. Bowel sounds normal.  Musculoskeletal: Strength equal in upper extremities  Extremities: Trace pitting edema   Neurologic: Sensation grossly intact, alert and oriented x3       Intake/Output:   No intake/output data recorded.    Outpatient Medications  Current Outpatient Medications on File Prior to Visit   Medication Sig Dispense Refill    acetaminophen (Tylenol) 500 mg tablet Take 1 tablet (500 mg) by mouth 4 times a day.      aMILoride (Midamor) 5 mg tablet Take 1 tablet (5 mg) by mouth once daily.      amLODIPine (Norvasc) 2.5 mg tablet Take 1 tablet (2.5 mg) by mouth once daily.      blood sugar diagnostic (Accu-Chek Barbara Plus test strp) strip Apply 1 each topically 4 times a day. Patient is to check blood sugar 4 times a day      cholecalciferol (Vitamin D-3) 25 MCG (1000 UT) tablet Take 3 tablets (75 mcg) by mouth once daily.      cyanocobalamin (Vitamin B-12) 500 mcg tablet Take 1 tablet (500 mcg) by mouth once daily.      insulin lispro protamin-lispro (HumaLOG Mix 75-25) 100 unit/mL (75-25) injection Inject under the skin.      Jardiance 10 mg Take 1 tablet (10 mg) by mouth once daily.      losartan (Cozaar) 100 mg tablet Take 1 tablet (100 mg) by mouth once daily. 90 tablet 3    magnesium oxide (Mag-Ox) 400 mg (241.3 mg magnesium) tablet Take 1 tablet (400  "mg) by mouth once daily.      metFORMIN (Glucophage) 500 mg tablet Take 1 tablet (500 mg) by mouth 4 times a day.      nystatin (Mycostatin) cream twice a day. APPLY A THIN LAYER TO AFFECTED AREA(S) AND RUB IN WELL TWICE DAILY.      pen needle, diabetic 32 gauge x 5/32\" needle 1 each 2 times a day.      rosuvastatin (Crestor) 5 mg tablet Take 1 tablet (5 mg) by mouth once daily.      triamcinolone (Kenalog) 0.1 % cream Apply 1 Application topically 2 times a day.      warfarin (Coumadin) 5 mg tablet Take 1 tablet (5 mg) by mouth once daily at bedtime.       No current facility-administered medications on file prior to visit.       Labs: (past 26 weeks)  Recent Results (from the past 4368 hour(s))   Comprehensive Metabolic Panel    Collection Time: 06/01/23  8:01 AM   Result Value Ref Range    Glucose 117 (H) 74 - 99 mg/dL    Sodium 138 136 - 145 mmol/L    Potassium 4.8 3.5 - 5.3 mmol/L    Chloride 102 98 - 107 mmol/L    Bicarbonate 28 21 - 32 mmol/L    Anion Gap 13 10 - 20 mmol/L    Urea Nitrogen 27 (H) 6 - 23 mg/dL    Creatinine 1.36 (H) 0.50 - 1.30 mg/dL    GFR MALE 51 (A) >90 mL/min/1.73m2    Calcium 9.4 8.6 - 10.3 mg/dL    Albumin 4.2 3.4 - 5.0 g/dL    Alkaline Phosphatase 40 33 - 136 U/L    Total Protein 6.8 6.4 - 8.2 g/dL    AST 22 9 - 39 U/L    Total Bilirubin 0.7 0.0 - 1.2 mg/dL    ALT (SGPT) 23 10 - 52 U/L   Lipid Panel    Collection Time: 06/01/23  8:01 AM   Result Value Ref Range    Cholesterol 114 0 - 199 mg/dL    HDL 62.2 mg/dL    Cholesterol/HDL Ratio 1.8     LDL 31 0 - 99 mg/dL    VLDL 21 0 - 40 mg/dL    Triglycerides 105 0 - 149 mg/dL   Vitamin D, Total    Collection Time: 06/01/23  8:01 AM   Result Value Ref Range    Vitamin D, 25-Hydroxy 90 ng/mL   Hemoglobin A1C    Collection Time: 06/01/23  8:01 AM   Result Value Ref Range    Hemoglobin A1C 7.5 (A) %    Estimated Average Glucose 169 MG/DL   Magnesium    Collection Time: 06/01/23  8:01 AM   Result Value Ref Range    Magnesium 2.06 1.60 - 2.40 " mg/dL   CBC and Auto Differential    Collection Time: 06/01/23  8:01 AM   Result Value Ref Range    WBC 8.5 4.4 - 11.3 x10E9/L    RBC 4.46 (L) 4.50 - 5.90 x10E12/L    Hemoglobin 12.9 (L) 13.5 - 17.5 g/dL    Hematocrit 41.3 41.0 - 52.0 %    MCV 93 80 - 100 fL    MCHC 31.2 (L) 32.0 - 36.0 g/dL    Platelets 248 150 - 450 x10E9/L    RDW 13.9 11.5 - 14.5 %    Neutrophils % 63.2 40.0 - 80.0 %    Immature Granulocytes %, Automated 0.4 0.0 - 0.9 %    Lymphocytes % 23.0 13.0 - 44.0 %    Monocytes % 10.1 2.0 - 10.0 %    Eosinophils % 2.6 0.0 - 6.0 %    Basophils % 0.7 0.0 - 2.0 %    Neutrophils Absolute 5.36 1.60 - 5.50 x10E9/L    Lymphocytes Absolute 1.95 0.80 - 3.00 x10E9/L    Monocytes Absolute 0.86 (H) 0.05 - 0.80 x10E9/L    Eosinophils Absolute 0.22 0.00 - 0.40 x10E9/L    Basophils Absolute 0.06 0.00 - 0.10 x10E9/L   Protime-INR    Collection Time: 09/14/23 12:00 AM   Result Value Ref Range    Protime External  seconds    INR External 2.60    POCT INR manually resulted    Collection Time: 10/12/23 11:00 AM   Result Value Ref Range    POC INR 3.00 2 - 3    POC Prothrombin Time 35.70    Renal Function Panel    Collection Time: 10/24/23 11:15 AM   Result Value Ref Range    Glucose 137 (H) 74 - 99 mg/dL    Sodium 139 136 - 145 mmol/L    Potassium 5.1 3.5 - 5.3 mmol/L    Chloride 104 98 - 107 mmol/L    Bicarbonate 27 21 - 32 mmol/L    Anion Gap 13 10 - 20 mmol/L    Urea Nitrogen 25 (H) 6 - 23 mg/dL    Creatinine 1.22 0.50 - 1.30 mg/dL    eGFR 58 (L) >60 mL/min/1.73m*2    Calcium 8.9 8.6 - 10.3 mg/dL    Phosphorus 4.0 2.5 - 4.9 mg/dL    Albumin 4.1 3.4 - 5.0 g/dL   Urinalysis with Reflex Microscopic    Collection Time: 10/24/23 11:15 AM   Result Value Ref Range    Color, Urine Yellow Straw, Yellow    Appearance, Urine Clear Clear    Specific Gravity, Urine 1.010 1.005 - 1.035    pH, Urine 7.0 5.0, 5.5, 6.0, 6.5, 7.0, 7.5, 8.0    Protein, Urine NEGATIVE NEGATIVE mg/dL    Glucose, Urine >=500 (3+) (A) NEGATIVE mg/dL    Blood,  Urine NEGATIVE NEGATIVE    Ketones, Urine NEGATIVE NEGATIVE mg/dL    Bilirubin, Urine NEGATIVE NEGATIVE    Urobilinogen, Urine <2.0 <2.0 mg/dL    Nitrite, Urine NEGATIVE NEGATIVE    Leukocyte Esterase, Urine NEGATIVE NEGATIVE   Protein, Urine Random    Collection Time: 10/24/23 11:15 AM   Result Value Ref Range    Total Protein, Urine Random <4 (L) 5 - 25 mg/dL    Creatinine, Urine Random 23.7 20.0 - 370.0 mg/dL    T. Protein/Creatinine Ratio     Hemoglobin A1C    Collection Time: 10/24/23 11:15 AM   Result Value Ref Range    Hemoglobin A1C 7.3 (H) see below %    Estimated Average Glucose 163 Not Established mg/dL   POCT INR manually resulted    Collection Time: 11/02/23 11:40 AM   Result Value Ref Range    POC INR 2.40 2.00 - 3.00    POC Prothrombin Time 28.60    Lipid Panel    Collection Time: 11/15/23  9:48 AM   Result Value Ref Range    Cholesterol 133 0 - 199 mg/dL    HDL-Cholesterol 66.9 mg/dL    Cholesterol/HDL Ratio 2.0     LDL Calculated 42 <=99 mg/dL    VLDL 24 0 - 40 mg/dL    Triglycerides 122 0 - 149 mg/dL    Non HDL Cholesterol 66 0 - 149 mg/dL       ECG  No results found for this or any previous visit (from the past 4464 hour(s)).    Echocardiogram  No results found for this or any previous visit from the past 1095 days.      CV Studies:  EKG: No results found for this or any previous visit (from the past 4464 hour(s)).  Echocardiogram:   Echocardiogram     [place] [id number]  Phone [phone] Fax [phone]    TRANSTHORACIC ECHOCARDIOGRAM REPORT      Patient Name:     JINA Stanley Physician:   [id number] Ludin Morgan DO  Study Date:       5/28/2021         Referring Physician: [id number] WANG  MRN/PID:          [id number]          PCP:  Accession/Order#: [id number]      Department Location: [facility] Lab  YOB: 1940         Fellow:  Gender:           M                 Nurse:  Admit Date:                          Sonographer:         [place] Delgado [facility]  Admission Status: Outpatient        Additional Staff:  Height:           182.88 cm         CC Report to:  Weight:           80.74 kg          Study Type:          Echocardiogram  BSA:              2.03 m2  Blood Pressure: 168 /90 mmHg    Diagnosis/ICD: I48.0-Paroxysmal atrial fibrillation  Indication:    A-Fib  Procedure/CPT: Echo Complete w Full Doppler-[id number]  Study Detail: The following Echo studies were performed: 2D, M-Mode, Doppler and  color flow.      PHYSICIAN INTERPRETATION:  Left Ventricle: The left ventricular systolic function is normal, with an estimated ejection fraction of 55-60%. There are no regional wall motion abnormalities. The left ventricular cavity size is normal. There is mild concentric left ventricular hypertrophy. Left ventricular diastolic filling was indeterminate.  Left Atrium: The left atrium is severely dilated.  Right Ventricle: The right ventricle is normal in size. There is normal right ventricular global systolic function.  Right Atrium: The right atrium is mildly dilated.  Aortic Valve: The aortic valve is trileaflet. There is no evidence of aortic valve regurgitation. The mean gradient of the aortic valve is 8.0 mmHg.  Mitral Valve: The mitral valve is normal in structure. There is mild to moderate mitral valve regurgitation.  Tricuspid Valve: The tricuspid valve is structurally normal. There is trace to mild tricuspid regurgitation.  Pulmonic Valve: The pulmonic valve is structurally normal. There is physiologic pulmonic valve regurgitation.  Pericardium: There is no pericardial effusion noted.  Aorta: The aortic root is normal.      CONCLUSIONS:  1. The left ventricular systolic function is normal with a 55-60% estimated ejection fraction.  2. The left atrium is severely dilated.  3. Mild to moderate mitral valve regurgitation.    QUANTITATIVE DATA SUMMARY:  2D MEASUREMENTS:  Normal Ranges:  IVSd:          1.20 cm     (0.6-1.1cm)  LVPWd:         1.20 cm    (0.6-1.1cm)  LVIDd:         4.60 cm    (3.9-5.9cm)  LVIDs:         2.80 cm  LV Mass Index: 101.1 g/m2  LV % FS        39.1 %    LA VOLUME:  Normal Ranges:  LA Vol A4C:        100.1 ml   (22+/-6mL/m2)  LA Vol A2C:        123.8 ml  LA Vol BP:         113.0 ml  LA Vol Index A4C:  49.4ml/m2  LA Vol Index A2C:  61.0 ml/m2  LA Vol Index BP:   55.7 ml/m2  LA Area A4C:       27.4 cm2  LA Area A2C:       31.0 cm2  LA Major Axis A4C: 6.4 cm  LA Major Axis A2C: 6.6 cm  LA Volume Index:   55.0 ml/m2  LA Vol A4C:        97.0 ml  LA Vol A2C:        117.0 ml    RA VOLUME BY A/L METHOD:  Normal Ranges:  RA Area A4C: 16.0 cm2    M-MODE MEASUREMENTS:  Normal Ranges:  Ao Root: 3.40 cm (2.0-3.7cm)  AoV Exc: 1.60 cm (1.5-2.5cm)  LAs:     4.30 cm (2.7-4.0cm)    AORTA MEASUREMENTS:  Normal Ranges:  AoV Exc:     1.60 cm (1.5-2.5cm)  Ao Sinus, d: 3.20 cm (2.1-3.5cm)  Ao STJ, d:   2.80 cm (1.7-3.4cm)  Asc Ao, d:   3.20 cm (2.1-3.4cm)    LV SYSTOLIC FUNCTION BY 2D PLANIMETRY (MOD):  Normal Ranges:  EF-A4C View: 58.2 % (>55%)  EF-A2C View: 60.6 %  EF-Biplane:  59.3 %    LV DIASTOLIC FUNCTION:  Normal Ranges:  MV Peak E:    1.38 m/s    (0.7-1.2 m/s)  MV Peak A:    0.26 m/s    (0.42-0.7 m/s)  E/A Ratio:    5.21        (1.0-2.2)  MV e'         0.12 m/s    (>8.0)  MV lateral e' 0.12 m/s  MV medial e'  0.11 m/s  MV A Dur:     111.00 msec  E/e' Ratio:   12.00       (<8.0)  LV IVRT:      53 msec     (<100ms)    MITRAL VALVE:  Normal Ranges:  MV DT: 211 msec (150-240msec)    MITRAL INSUFFICIENCY:  Normal Ranges:  PISA Radius:  0.5 cm  MR VTI:       182.00 cm  MR Vmax:      544.00 cm/s  MR Alias Andrea: 30.8 cm/s  MR Volume:    16.19 ml  MR Flow Rt:   48.38 ml/s  MR EROA:      0.09 cm2    AORTIC VALVE:  Normal Ranges:  AoV Mean P.0 mmHg (1.7-11.5mmHg)  LVOT Max Andrea:            0.95 m/s (<1.1m/s)  AoV VTI:                 40.60 cm (18-25cm)  LVOT VTI:                20.60 cm  LVOT Diameter:            2.00 cm  (1.8-2.4cm)  AoV Area, VTI:           1.59 cm2 (2.5-5.5cm2)  AoV Dimensionless Index: 0.51    RIGHT VENTRICLE:  RV 1   3.6 cm  RV 2   2.0 cm  RV 3   8.1 cm  TAPSE: 22.0 mm  RV s'  0.17 m/s    TRICUSPID VALVE/RVSP:  Normal Ranges:  Peak TR Velocity: 2.80 m/s  RV Syst Pressure: 34.4 mmHg (< 30mmHg)  TV E Vmax:        0.62 m/s  (0.3-0.7m/s)  TV A Vmax:        0.44 m/s  IVC Diam:         2.10 cm      [id number] Ludin Morgan DO  Electronically signed on 5/28/2021 at 3:05:49 PM         Final     Stress Testing IMGRESULT([id number]:1:1825): No results found for this or any previous visit from the past 1825 days.    Cardiac Catheterization: No results found for this or any previous visit from the past 1825 days.  No results found for this or any previous visit from the past 3650 days.     Cardiac Scoring: No results found for this or any previous visit from the past 1825 days.    AAA : No results found for this or any previous visit from the past 1825 days.    OTHER: No results found for this or any previous visit from the past 1825 days.    LAST IMAGING RESULTS  ELECTROCARDIOGRAM RHYTHM STRIP  Ordered by an unspecified provider.      Problem List Items Addressed This Visit       AIVR (accelerated idioventricular rhythm) (CMS/HCC)    Relevant Orders    ECG 12 Lead (Completed)    Transthoracic Echo (TTE) Complete    Comprehensive Metabolic Panel    Magnesium    CBC    Follow Up In Cardiology    Benign essential hypertension    Relevant Orders    ECG 12 Lead (Completed)    Transthoracic Echo (TTE) Complete    Comprehensive Metabolic Panel    Magnesium    CBC    Follow Up In Cardiology    Hyperlipidemia    Relevant Orders    ECG 12 Lead (Completed)    Transthoracic Echo (TTE) Complete    Comprehensive Metabolic Panel    Magnesium    CBC    Follow Up In Cardiology    Persistent atrial fibrillation (CMS/HCC) - Primary    Relevant Orders    ECG 12 Lead (Completed)    Transthoracic Echo (TTE) Complete    Comprehensive Metabolic  Panel    Magnesium    CBC    Follow Up In Cardiology    Warfarin anticoagulation    Relevant Orders    ECG 12 Lead (Completed)    Transthoracic Echo (TTE) Complete    Comprehensive Metabolic Panel    Magnesium    CBC    Follow Up In Cardiology              Ludin Morgan DO, FACC, FACOI

## 2023-11-22 ENCOUNTER — OFFICE VISIT (OUTPATIENT)
Dept: CARDIOLOGY | Facility: CLINIC | Age: 83
End: 2023-11-22
Payer: MEDICARE

## 2023-11-22 VITALS
HEIGHT: 72 IN | HEART RATE: 66 BPM | DIASTOLIC BLOOD PRESSURE: 86 MMHG | SYSTOLIC BLOOD PRESSURE: 130 MMHG | WEIGHT: 170.6 LBS | BODY MASS INDEX: 23.11 KG/M2

## 2023-11-22 DIAGNOSIS — Z79.01 WARFARIN ANTICOAGULATION: ICD-10-CM

## 2023-11-22 DIAGNOSIS — E78.2 MIXED HYPERLIPIDEMIA: ICD-10-CM

## 2023-11-22 DIAGNOSIS — I10 BENIGN ESSENTIAL HYPERTENSION: ICD-10-CM

## 2023-11-22 DIAGNOSIS — I48.19 PERSISTENT ATRIAL FIBRILLATION (MULTI): Primary | ICD-10-CM

## 2023-11-22 DIAGNOSIS — I44.2 AIVR (ACCELERATED IDIOVENTRICULAR RHYTHM) (MULTI): ICD-10-CM

## 2023-11-22 PROBLEM — R42 ORTHOSTATIC DIZZINESS: Status: ACTIVE | Noted: 2023-06-01

## 2023-11-22 PROCEDURE — 1160F RVW MEDS BY RX/DR IN RCRD: CPT | Performed by: INTERNAL MEDICINE

## 2023-11-22 PROCEDURE — 99214 OFFICE O/P EST MOD 30 MIN: CPT | Performed by: INTERNAL MEDICINE

## 2023-11-22 PROCEDURE — 1159F MED LIST DOCD IN RCRD: CPT | Performed by: INTERNAL MEDICINE

## 2023-11-22 PROCEDURE — 3079F DIAST BP 80-89 MM HG: CPT | Performed by: INTERNAL MEDICINE

## 2023-11-22 PROCEDURE — 3075F SYST BP GE 130 - 139MM HG: CPT | Performed by: INTERNAL MEDICINE

## 2023-11-22 PROCEDURE — 93000 ELECTROCARDIOGRAM COMPLETE: CPT | Performed by: INTERNAL MEDICINE

## 2023-11-22 PROCEDURE — 1036F TOBACCO NON-USER: CPT | Performed by: INTERNAL MEDICINE

## 2023-11-27 DIAGNOSIS — I48.19 PERSISTENT ATRIAL FIBRILLATION (MULTI): Primary | ICD-10-CM

## 2023-11-30 ENCOUNTER — ANTICOAGULATION - WARFARIN VISIT (OUTPATIENT)
Dept: PHARMACY | Facility: HOSPITAL | Age: 83
End: 2023-11-30
Payer: MEDICARE

## 2023-11-30 DIAGNOSIS — I48.19 PERSISTENT ATRIAL FIBRILLATION (MULTI): ICD-10-CM

## 2023-11-30 DIAGNOSIS — I48.0 PAROXYSMAL ATRIAL FIBRILLATION (MULTI): Primary | ICD-10-CM

## 2023-11-30 LAB
POC INR: 2.2 (ref 2–3)
POC PROTHROMBIN TIME: 26

## 2023-11-30 PROCEDURE — 85610 PROTHROMBIN TIME: CPT | Mod: QW

## 2023-11-30 RX ORDER — WARFARIN SODIUM 5 MG/1
TABLET ORAL
Qty: 180 TABLET | Refills: 2 | Status: SHIPPED | OUTPATIENT
Start: 2023-11-30

## 2023-11-30 NOTE — PROGRESS NOTES
Mr. Lauren is a referral from Dr. Morgan for warfarin management of Afib (goal 2-3). He denies any changes in health. He denies any missed or extra doses. No signs or symptoms of bleeding reported. He does say he bruises very easily. He previously was very stable on 7.5 mg Saturday and 5 mg all other days. No changes in his diet or his other medications. Last week he strained his calf muscle and he went to the urgent care in [place]. The urgent care prescribed him a lidocaine patch and cyclobenzaprine. He said the pain is much better now. For his calf pain he said he still takes the Ana Paula's brand Leg Cramp PM, which is a quinine derivative product and it was discussed that this may increase the risk of bleeding with warfarin. The last dose he took was about a month ago. Today his INR is 2.2, so we will have him continue taking his weekly regimen of 5mg daily. We will follow up in 4 weeks.

## 2023-11-30 NOTE — PATIENT INSTRUCTIONS
Your INR today is in range at 2.2. Since it is within range, will have you continue your dose to 5 mg every day. We will follow up in 4 weeks.  If any questions arise do not hesitate to call us at [phone] M-F from 8:30am-4:30pm or at   [phone] after 5pm or on the weekends. Continue to monitor for any excess bleeding or bruising, especially for blood in your stool.

## 2023-12-01 ENCOUNTER — HOSPITAL ENCOUNTER (OUTPATIENT)
Dept: CARDIOLOGY | Facility: HOSPITAL | Age: 83
Discharge: HOME | End: 2023-12-01
Payer: MEDICARE

## 2023-12-01 DIAGNOSIS — I48.20 CHRONIC ATRIAL FIBRILLATION, UNSPECIFIED (MULTI): ICD-10-CM

## 2023-12-01 DIAGNOSIS — I48.19 PERSISTENT ATRIAL FIBRILLATION (MULTI): ICD-10-CM

## 2023-12-01 DIAGNOSIS — I10 BENIGN ESSENTIAL HYPERTENSION: ICD-10-CM

## 2023-12-01 DIAGNOSIS — E78.2 MIXED HYPERLIPIDEMIA: ICD-10-CM

## 2023-12-01 DIAGNOSIS — Z79.01 WARFARIN ANTICOAGULATION: ICD-10-CM

## 2023-12-01 DIAGNOSIS — I44.2 AIVR (ACCELERATED IDIOVENTRICULAR RHYTHM) (MULTI): ICD-10-CM

## 2023-12-01 PROCEDURE — 93306 TTE W/DOPPLER COMPLETE: CPT

## 2023-12-01 PROCEDURE — 93356 MYOCRD STRAIN IMG SPCKL TRCK: CPT | Performed by: STUDENT IN AN ORGANIZED HEALTH CARE EDUCATION/TRAINING PROGRAM

## 2023-12-01 PROCEDURE — 93306 TTE W/DOPPLER COMPLETE: CPT | Performed by: STUDENT IN AN ORGANIZED HEALTH CARE EDUCATION/TRAINING PROGRAM

## 2023-12-01 PROCEDURE — 93356 MYOCRD STRAIN IMG SPCKL TRCK: CPT

## 2023-12-02 LAB
AORTIC VALVE MEAN GRADIENT: 16.8
AORTIC VALVE PEAK VELOCITY: 2.68
AV PEAK GRADIENT: 28.8
AVA (PEAK VEL): 1.2
AVA (VTI): 1.12
EJECTION FRACTION APICAL 4 CHAMBER: 62.9
EJECTION FRACTION: 66
GLOBAL LONGITUDINAL STRAIN: 15.6
LEFT ATRIUM VOLUME AREA LENGTH INDEX BSA: 49.2
LEFT VENTRICLE INTERNAL DIMENSION DIASTOLE: 4.86 (ref 3.5–6)
LEFT VENTRICULAR OUTFLOW TRACT DIAMETER: 1.97
MITRAL VALVE E/A RATIO: 5.6
MITRAL VALVE E/E' RATIO: 12.95
RIGHT VENTRICLE FREE WALL PEAK S': 10.46
RIGHT VENTRICLE PEAK SYSTOLIC PRESSURE: 33.5
TRICUSPID ANNULAR PLANE SYSTOLIC EXCURSION: 2

## 2023-12-04 ENCOUNTER — TELEPHONE (OUTPATIENT)
Dept: CARDIOLOGY | Facility: CLINIC | Age: 83
End: 2023-12-04
Payer: MEDICARE

## 2023-12-04 NOTE — TELEPHONE ENCOUNTER
----- Message from Ludin Morgan DO sent at 12/3/2023  9:43 AM EST -----  Normal left ventricular systolic function, grade 1 diastolic dysfunction, mild to moderate tricuspid valve regurgitation, moderate aortic valve stenosis with a mean pressure gradient of 16.8 mmHg and a dimensionless index of 0.37.  Continue current care.    12/4/23  2295  Called patient; no answer. Left brief voice message informing of echocardiogram results, to continue with current care and to call if questions.

## 2023-12-05 ENCOUNTER — APPOINTMENT (OUTPATIENT)
Dept: PRIMARY CARE | Facility: CLINIC | Age: 83
End: 2023-12-05
Payer: MEDICARE

## 2023-12-05 ENCOUNTER — TELEPHONE (OUTPATIENT)
Dept: CARDIOLOGY | Facility: CLINIC | Age: 83
End: 2023-12-05

## 2023-12-05 NOTE — TELEPHONE ENCOUNTER
12/5/23  Patient called in to office with questions regarding echo results.      Returned call and went over results with patient regarding aortic valve stenosis, mitral valve regurgitation and continuation of current care.        Patient verbalized understanding of conversation.

## 2023-12-12 ENCOUNTER — TELEPHONE (OUTPATIENT)
Dept: PRIMARY CARE | Facility: CLINIC | Age: 83
End: 2023-12-12
Payer: MEDICARE

## 2023-12-12 DIAGNOSIS — K52.9 CHRONIC DIARRHEA OF UNKNOWN ORIGIN: Primary | ICD-10-CM

## 2023-12-13 RX ORDER — LOPERAMIDE HCL 2 MG
2 TABLET ORAL 4 TIMES DAILY PRN
Qty: 30 TABLET | Refills: 1 | Status: SHIPPED | OUTPATIENT
Start: 2023-12-13 | End: 2023-12-23

## 2023-12-13 NOTE — TELEPHONE ENCOUNTER
I have not seen this patient yet, but last note from [facility] states patient needs to be taking imodium, imodium sent.

## 2023-12-18 ENCOUNTER — TELEPHONE (OUTPATIENT)
Dept: PRIMARY CARE | Facility: CLINIC | Age: 83
End: 2023-12-18
Payer: MEDICARE

## 2023-12-18 DIAGNOSIS — K52.9 CHRONIC DIARRHEA OF UNKNOWN ORIGIN: Primary | ICD-10-CM

## 2023-12-18 NOTE — TELEPHONE ENCOUNTER
Refill requested for Diphenoxylate-Atropine 2.5 mg This LPN saw previous messages and attempted to call patient to discuss LVM for callback

## 2023-12-19 NOTE — TELEPHONE ENCOUNTER
Patient left another voicemail stating that he needs the Lomotil and doesn't understand why its not been filled Attempted to call patient again to discuss LVM to callback

## 2023-12-19 NOTE — TELEPHONE ENCOUNTER
Patient is about to run out of the Lomotil has been working off a script from Dr Butler from 2/21/22 of 50 tabs he stated takes Imodium every day almost but sometimes needs something stronger, Please reconsider

## 2023-12-20 NOTE — TELEPHONE ENCOUNTER
This is an [facility] patient who has yet to est care with me. All I have to go off of is [facility]'s note stating he was switched to imodium. The med he is requesting is a controlled substance(not filled since Feb 2022). I would recommend he make an appointment to discuss but this is not a medication I write for chronic diarrhea.

## 2023-12-20 NOTE — TELEPHONE ENCOUNTER
Patient requesting generic Lomotil to be sent to [facility] a smaller amount isto his appointment  Original script was Diphenoxylate-atropine 2.5-0.025 mg Take 1 tablet daily PRN

## 2023-12-21 RX ORDER — DIPHENOXYLATE HYDROCHLORIDE AND ATROPINE SULFATE 2.5; .025 MG/1; MG/1
1 TABLET ORAL DAILY PRN
Qty: 30 TABLET | Refills: 0 | Status: SHIPPED | OUTPATIENT
Start: 2023-12-21 | End: 2024-01-20

## 2023-12-28 ENCOUNTER — ANTICOAGULATION - WARFARIN VISIT (OUTPATIENT)
Dept: PHARMACY | Facility: HOSPITAL | Age: 83
End: 2023-12-28
Payer: MEDICARE

## 2023-12-28 DIAGNOSIS — I48.19 PERSISTENT ATRIAL FIBRILLATION (MULTI): ICD-10-CM

## 2023-12-28 DIAGNOSIS — I48.0 PAROXYSMAL ATRIAL FIBRILLATION (MULTI): Primary | ICD-10-CM

## 2023-12-28 LAB
POC INR: 2.2 (ref 2–3)
POC PROTHROMBIN TIME: 26.2

## 2023-12-28 PROCEDURE — 85610 PROTHROMBIN TIME: CPT | Mod: QW

## 2023-12-28 NOTE — PROGRESS NOTES
Mr. Lauren is a referral from Dr. Morgan for warfarin management of Afib (goal 2-3). He denies any changes in health. He denies any missed or extra doses. No signs or symptoms of bleeding reported. He does say he bruises very easily. He previously was very stable on 7.5 mg Saturday and 5 mg all other days. No changes in his diet or his other medications. Last week he strained his calf muscle and he went to the urgent care in [place]. The urgent care prescribed him a lidocaine patch and cyclobenzaprine. He said the pain is much better now. For his calf pain he said he still takes the Ana Paula's brand Leg Cramp PM, which is a quinine derivative product and it was discussed that this may increase the risk of bleeding with warfarin. The last dose he took was about a month ago. Today his INR is 2.2, so we will have him continue taking his weekly regimen of 5mg daily. We will follow up in 4 weeks.

## 2023-12-28 NOTE — PATIENT INSTRUCTIONS
Your INR today is in range at 2.2. Since it is within range, continue your dose of 5 mg every day. We will follow up in 4 weeks.  If any questions arise do not hesitate to call us at [phone] M-F from 8:30am-4:30pm or at   [phone] after 5pm or on the weekends. Continue to monitor for any excess bleeding or bruising, especially for blood in your stool.

## 2024-01-18 ENCOUNTER — TELEPHONE (OUTPATIENT)
Dept: UROLOGY | Facility: CLINIC | Age: 84
End: 2024-01-18
Payer: MEDICARE

## 2024-01-18 DIAGNOSIS — Z98.52 STATUS POST VASECTOMY: Primary | ICD-10-CM

## 2024-01-18 RX ORDER — CEPHALEXIN 500 MG/1
500 CAPSULE ORAL 2 TIMES DAILY
Qty: 14 CAPSULE | Refills: 3 | Status: SHIPPED | OUTPATIENT
Start: 2024-01-18 | End: 2024-02-15

## 2024-01-25 ENCOUNTER — ANTICOAGULATION - WARFARIN VISIT (OUTPATIENT)
Dept: PHARMACY | Facility: HOSPITAL | Age: 84
End: 2024-01-25
Payer: MEDICARE

## 2024-01-25 DIAGNOSIS — I48.0 PAROXYSMAL ATRIAL FIBRILLATION (MULTI): ICD-10-CM

## 2024-01-25 DIAGNOSIS — I48.19 PERSISTENT ATRIAL FIBRILLATION (MULTI): Primary | ICD-10-CM

## 2024-01-25 LAB
POC INR: 2.1 (ref 2–3)
POC PROTHROMBIN TIME: 25

## 2024-01-25 PROCEDURE — 85610 PROTHROMBIN TIME: CPT | Mod: QW

## 2024-01-25 NOTE — PROGRESS NOTES
Mr. Lauren is a referral from Dr. Morgan for warfarin management of Afib (goal 2-3). He denies any changes in health. He denies any missed or extra doses. No signs or symptoms of bleeding reported. He does say he bruises very easily. No changes in his diet or his other medications. For his calf pain he said he still takes the Ana Paula's brand Leg Cramp PM, which is a quinine derivative product and it was discussed that this may increase the risk of bleeding with warfarin. He takes this sparingly. We discussed if he starts taking frequently to let the clinic know. Today his INR is 2.1, so we will have him continue taking his weekly regimen of 5mg daily. He has been very stable on his regimen of 5mg daily. We will follow up in 6 weeks.

## 2024-01-25 NOTE — PATIENT INSTRUCTIONS
Your INR today is in range at 2.1. Since it is within range, will have you continue your dose to 5 mg every day. We will follow up in 6 weeks.  If any questions arise do not hesitate to call us at [phone] M-F from 8:30am-4:30pm or at   [phone] after 5pm or on the weekends. Continue to monitor for any excess bleeding or bruising, especially for blood in your stool.

## 2024-02-08 DIAGNOSIS — Z79.4 TYPE 2 DIABETES MELLITUS WITHOUT COMPLICATION, WITH LONG-TERM CURRENT USE OF INSULIN (MULTI): ICD-10-CM

## 2024-02-08 DIAGNOSIS — E11.9 TYPE 2 DIABETES MELLITUS WITHOUT COMPLICATION, WITH LONG-TERM CURRENT USE OF INSULIN (MULTI): ICD-10-CM

## 2024-02-08 NOTE — TELEPHONE ENCOUNTER
Med refill   blood sugar diagnostic (Accu-Chek Barbara Plus test strp) strip [[id number]]     [facility] in [place]

## 2024-02-09 RX ORDER — BLOOD SUGAR DIAGNOSTIC
1 STRIP MISCELLANEOUS 4 TIMES DAILY
Qty: 100 STRIP | Refills: 0 | Status: SHIPPED | OUTPATIENT
Start: 2024-02-09 | End: 2024-02-15 | Stop reason: SDUPTHER

## 2024-02-12 ENCOUNTER — TELEPHONE (OUTPATIENT)
Dept: PRIMARY CARE | Facility: CLINIC | Age: 84
End: 2024-02-12
Payer: MEDICARE

## 2024-02-12 DIAGNOSIS — E11.9 TYPE 2 DIABETES MELLITUS WITHOUT COMPLICATION, WITH LONG-TERM CURRENT USE OF INSULIN (MULTI): Primary | ICD-10-CM

## 2024-02-12 DIAGNOSIS — Z79.4 TYPE 2 DIABETES MELLITUS WITHOUT COMPLICATION, WITH LONG-TERM CURRENT USE OF INSULIN (MULTI): Primary | ICD-10-CM

## 2024-02-12 NOTE — TELEPHONE ENCOUNTER
Med Refill   metFORMIN (Glucophage) 500 mg tablet [[id number]]     [facility] - [place], [place] - [address]  [address] [place] [id number]  Phone: [phone]  Fax: [phone]

## 2024-02-13 NOTE — TELEPHONE ENCOUNTER
Are you willing to fill patient last seen by Dr. Peralta 6/5/23 next office vist to establish is with you 2/26/24

## 2024-02-14 ENCOUNTER — TELEPHONE (OUTPATIENT)
Dept: PRIMARY CARE | Facility: CLINIC | Age: 84
End: 2024-02-14
Payer: MEDICARE

## 2024-02-14 DIAGNOSIS — Z79.4 TYPE 2 DIABETES MELLITUS WITHOUT COMPLICATION, WITH LONG-TERM CURRENT USE OF INSULIN (MULTI): ICD-10-CM

## 2024-02-14 DIAGNOSIS — E11.9 TYPE 2 DIABETES MELLITUS WITHOUT COMPLICATION, WITH LONG-TERM CURRENT USE OF INSULIN (MULTI): ICD-10-CM

## 2024-02-14 RX ORDER — METFORMIN HYDROCHLORIDE 500 MG/1
500 TABLET ORAL 4 TIMES DAILY
Qty: 360 TABLET | Refills: 0 | Status: SHIPPED | OUTPATIENT
Start: 2024-02-14 | End: 2024-02-26 | Stop reason: SDUPTHER

## 2024-02-14 NOTE — TELEPHONE ENCOUNTER
Rx Refill Request Telephone Encounter    Name:  Jonah Lauren  :  [id number]  Medication Name:  Blood Sugar Diagnostic (Accu-Check Barbara Plus test strip)  1 each      100    Specific Pharmacy location:  [facility]  **Was sent to wrong pharmacy, please send to [facility]

## 2024-02-15 ENCOUNTER — TELEPHONE (OUTPATIENT)
Dept: PRIMARY CARE | Facility: CLINIC | Age: 84
End: 2024-02-15
Payer: MEDICARE

## 2024-02-15 DIAGNOSIS — E11.9 TYPE 2 DIABETES MELLITUS WITHOUT COMPLICATION, WITHOUT LONG-TERM CURRENT USE OF INSULIN (MULTI): ICD-10-CM

## 2024-02-15 DIAGNOSIS — N40.1 BENIGN PROSTATIC HYPERPLASIA WITH NOCTURIA: ICD-10-CM

## 2024-02-15 DIAGNOSIS — R35.1 BENIGN PROSTATIC HYPERPLASIA WITH NOCTURIA: ICD-10-CM

## 2024-02-15 DIAGNOSIS — E61.2 MAGNESIUM DEFICIENCY: ICD-10-CM

## 2024-02-15 DIAGNOSIS — E78.2 MIXED HYPERLIPIDEMIA: ICD-10-CM

## 2024-02-15 DIAGNOSIS — D50.0 IRON DEFICIENCY ANEMIA DUE TO CHRONIC BLOOD LOSS: Primary | ICD-10-CM

## 2024-02-15 DIAGNOSIS — I10 BENIGN ESSENTIAL HYPERTENSION: ICD-10-CM

## 2024-02-15 DIAGNOSIS — I48.0 PAROXYSMAL ATRIAL FIBRILLATION (MULTI): ICD-10-CM

## 2024-02-15 RX ORDER — BLOOD SUGAR DIAGNOSTIC
1 STRIP MISCELLANEOUS 4 TIMES DAILY
Qty: 100 STRIP | Refills: 0 | Status: SHIPPED | OUTPATIENT
Start: 2024-02-15 | End: 2024-02-26 | Stop reason: SDUPTHER

## 2024-02-19 ENCOUNTER — LAB (OUTPATIENT)
Dept: LAB | Facility: LAB | Age: 84
End: 2024-02-19
Payer: MEDICARE

## 2024-02-19 DIAGNOSIS — E61.2 MAGNESIUM DEFICIENCY: ICD-10-CM

## 2024-02-19 DIAGNOSIS — I10 BENIGN ESSENTIAL HYPERTENSION: ICD-10-CM

## 2024-02-19 DIAGNOSIS — I48.0 PAROXYSMAL ATRIAL FIBRILLATION (MULTI): ICD-10-CM

## 2024-02-19 DIAGNOSIS — N40.1 BENIGN PROSTATIC HYPERPLASIA WITH NOCTURIA: ICD-10-CM

## 2024-02-19 DIAGNOSIS — E11.9 TYPE 2 DIABETES MELLITUS WITHOUT COMPLICATION, WITHOUT LONG-TERM CURRENT USE OF INSULIN (MULTI): ICD-10-CM

## 2024-02-19 DIAGNOSIS — R35.1 BENIGN PROSTATIC HYPERPLASIA WITH NOCTURIA: ICD-10-CM

## 2024-02-19 DIAGNOSIS — D50.0 IRON DEFICIENCY ANEMIA DUE TO CHRONIC BLOOD LOSS: ICD-10-CM

## 2024-02-19 DIAGNOSIS — E78.2 MIXED HYPERLIPIDEMIA: ICD-10-CM

## 2024-02-19 LAB
ALBUMIN SERPL BCP-MCNC: 4.1 G/DL (ref 3.4–5)
ALP SERPL-CCNC: 40 U/L (ref 33–136)
ALT SERPL W P-5'-P-CCNC: 16 U/L (ref 10–52)
ANION GAP SERPL CALC-SCNC: 12 MMOL/L (ref 10–20)
AST SERPL W P-5'-P-CCNC: 18 U/L (ref 9–39)
BASOPHILS # BLD AUTO: 0.06 X10*3/UL (ref 0–0.1)
BASOPHILS NFR BLD AUTO: 0.8 %
BILIRUB SERPL-MCNC: 0.6 MG/DL (ref 0–1.2)
BUN SERPL-MCNC: 24 MG/DL (ref 6–23)
CALCIUM SERPL-MCNC: 8.9 MG/DL (ref 8.6–10.3)
CHLORIDE SERPL-SCNC: 101 MMOL/L (ref 98–107)
CHOLEST SERPL-MCNC: 130 MG/DL (ref 0–199)
CHOLESTEROL/HDL RATIO: 2.2
CO2 SERPL-SCNC: 29 MMOL/L (ref 21–32)
CREAT SERPL-MCNC: 1.24 MG/DL (ref 0.5–1.3)
CREAT UR-MCNC: 52.5 MG/DL (ref 20–370)
EGFRCR SERPLBLD CKD-EPI 2021: 57 ML/MIN/1.73M*2
EOSINOPHIL # BLD AUTO: 0.21 X10*3/UL (ref 0–0.4)
EOSINOPHIL NFR BLD AUTO: 2.7 %
ERYTHROCYTE [DISTWIDTH] IN BLOOD BY AUTOMATED COUNT: 15.5 % (ref 11.5–14.5)
EST. AVERAGE GLUCOSE BLD GHB EST-MCNC: 171 MG/DL
GLUCOSE SERPL-MCNC: 139 MG/DL (ref 74–99)
HBA1C MFR BLD: 7.6 %
HCT VFR BLD AUTO: 41 % (ref 41–52)
HDLC SERPL-MCNC: 59.3 MG/DL
HGB BLD-MCNC: 12.2 G/DL (ref 13.5–17.5)
IMM GRANULOCYTES # BLD AUTO: 0.03 X10*3/UL (ref 0–0.5)
IMM GRANULOCYTES NFR BLD AUTO: 0.4 % (ref 0–0.9)
LDLC SERPL CALC-MCNC: 44 MG/DL
LYMPHOCYTES # BLD AUTO: 1.64 X10*3/UL (ref 0.8–3)
LYMPHOCYTES NFR BLD AUTO: 20.7 %
MAGNESIUM SERPL-MCNC: 2.01 MG/DL (ref 1.6–2.4)
MCH RBC QN AUTO: 26.2 PG (ref 26–34)
MCHC RBC AUTO-ENTMCNC: 29.8 G/DL (ref 32–36)
MCV RBC AUTO: 88 FL (ref 80–100)
MICROALBUMIN UR-MCNC: 22.1 MG/L
MICROALBUMIN/CREAT UR: 42.1 UG/MG CREAT
MONOCYTES # BLD AUTO: 0.78 X10*3/UL (ref 0.05–0.8)
MONOCYTES NFR BLD AUTO: 9.9 %
NEUTROPHILS # BLD AUTO: 5.19 X10*3/UL (ref 1.6–5.5)
NEUTROPHILS NFR BLD AUTO: 65.5 %
NON HDL CHOLESTEROL: 71 MG/DL (ref 0–149)
NRBC BLD-RTO: 0 /100 WBCS (ref 0–0)
PLATELET # BLD AUTO: 265 X10*3/UL (ref 150–450)
POTASSIUM SERPL-SCNC: 4.8 MMOL/L (ref 3.5–5.3)
PROT SERPL-MCNC: 6.3 G/DL (ref 6.4–8.2)
PSA SERPL-MCNC: 5.8 NG/ML
RBC # BLD AUTO: 4.65 X10*6/UL (ref 4.5–5.9)
SODIUM SERPL-SCNC: 137 MMOL/L (ref 136–145)
TRIGL SERPL-MCNC: 133 MG/DL (ref 0–149)
TSH SERPL-ACNC: 1.03 MIU/L (ref 0.44–3.98)
VLDL: 27 MG/DL (ref 0–40)
WBC # BLD AUTO: 7.9 X10*3/UL (ref 4.4–11.3)

## 2024-02-19 PROCEDURE — 84153 ASSAY OF PSA TOTAL: CPT

## 2024-02-19 PROCEDURE — 80061 LIPID PANEL: CPT

## 2024-02-19 PROCEDURE — 83735 ASSAY OF MAGNESIUM: CPT

## 2024-02-19 PROCEDURE — 36415 COLL VENOUS BLD VENIPUNCTURE: CPT

## 2024-02-19 PROCEDURE — 85025 COMPLETE CBC W/AUTO DIFF WBC: CPT

## 2024-02-19 PROCEDURE — 82570 ASSAY OF URINE CREATININE: CPT

## 2024-02-19 PROCEDURE — 80053 COMPREHEN METABOLIC PANEL: CPT

## 2024-02-19 PROCEDURE — 82043 UR ALBUMIN QUANTITATIVE: CPT

## 2024-02-19 PROCEDURE — 83036 HEMOGLOBIN GLYCOSYLATED A1C: CPT

## 2024-02-19 PROCEDURE — 84443 ASSAY THYROID STIM HORMONE: CPT

## 2024-02-20 ENCOUNTER — TELEPHONE (OUTPATIENT)
Dept: PRIMARY CARE | Facility: CLINIC | Age: 84
End: 2024-02-20
Payer: MEDICARE

## 2024-02-20 DIAGNOSIS — E78.5 HYPERLIPIDEMIA, UNSPECIFIED HYPERLIPIDEMIA TYPE: Primary | ICD-10-CM

## 2024-02-20 NOTE — TELEPHONE ENCOUNTER
Mr. Lauren dropped off the record of his blood sugar fingersticks for medicare approval of test strips.  I placed them on Mckenna ball's keyboard.

## 2024-02-21 RX ORDER — ROSUVASTATIN CALCIUM 5 MG/1
5 TABLET, COATED ORAL DAILY
Qty: 90 TABLET | Refills: 3 | Status: SHIPPED | OUTPATIENT
Start: 2024-02-21

## 2024-02-22 NOTE — RESULT ENCOUNTER NOTE
Labwork received and values are essentially normal. Will discuss any variations at upcoming visit.  Address A1C, PSA and urine albumin.

## 2024-02-26 ENCOUNTER — OFFICE VISIT (OUTPATIENT)
Dept: PRIMARY CARE | Facility: CLINIC | Age: 84
End: 2024-02-26
Payer: MEDICARE

## 2024-02-26 VITALS
HEIGHT: 72 IN | DIASTOLIC BLOOD PRESSURE: 74 MMHG | WEIGHT: 170.2 LBS | BODY MASS INDEX: 23.05 KG/M2 | OXYGEN SATURATION: 97 % | RESPIRATION RATE: 16 BRPM | HEART RATE: 54 BPM | SYSTOLIC BLOOD PRESSURE: 124 MMHG

## 2024-02-26 DIAGNOSIS — I48.0 PAROXYSMAL ATRIAL FIBRILLATION (MULTI): ICD-10-CM

## 2024-02-26 DIAGNOSIS — I10 BENIGN HYPERTENSION: ICD-10-CM

## 2024-02-26 DIAGNOSIS — Z79.4 TYPE 2 DIABETES MELLITUS WITHOUT COMPLICATION, WITH LONG-TERM CURRENT USE OF INSULIN (MULTI): ICD-10-CM

## 2024-02-26 DIAGNOSIS — N18.31 STAGE 3A CHRONIC KIDNEY DISEASE (MULTI): Primary | ICD-10-CM

## 2024-02-26 DIAGNOSIS — I44.2 AIVR (ACCELERATED IDIOVENTRICULAR RHYTHM) (MULTI): ICD-10-CM

## 2024-02-26 DIAGNOSIS — E11.42 TYPE 2 DIABETES MELLITUS WITH DIABETIC POLYNEUROPATHY, WITHOUT LONG-TERM CURRENT USE OF INSULIN (MULTI): ICD-10-CM

## 2024-02-26 DIAGNOSIS — I10 BENIGN ESSENTIAL HYPERTENSION: ICD-10-CM

## 2024-02-26 DIAGNOSIS — Z79.01 WARFARIN ANTICOAGULATION: ICD-10-CM

## 2024-02-26 DIAGNOSIS — E11.9 TYPE 2 DIABETES MELLITUS WITHOUT COMPLICATION, WITH LONG-TERM CURRENT USE OF INSULIN (MULTI): ICD-10-CM

## 2024-02-26 PROBLEM — Z86.39 HISTORY OF DIABETES MELLITUS: Status: ACTIVE | Noted: 2024-02-26

## 2024-02-26 PROBLEM — B35.1 ONYCHOMYCOSIS OF TOENAIL: Status: ACTIVE | Noted: 2024-02-26

## 2024-02-26 PROBLEM — H93.92 LESION OF LEFT EAR: Status: ACTIVE | Noted: 2024-02-26

## 2024-02-26 PROBLEM — E53.8 COBALAMIN DEFICIENCY: Status: ACTIVE | Noted: 2022-12-14

## 2024-02-26 PROBLEM — N40.0 BENIGN PROSTATIC HYPERPLASIA: Status: ACTIVE | Noted: 2023-06-01

## 2024-02-26 PROBLEM — K86.9 DISORDER OF PANCREATIC DUCT (HHS-HCC): Status: ACTIVE | Noted: 2023-06-01

## 2024-02-26 PROBLEM — R94.31 ABNORMAL ELECTROCARDIOGRAPHY: Status: ACTIVE | Noted: 2023-06-01

## 2024-02-26 PROBLEM — I44.1 MOBITZ TYPE I INCOMPLETE ATRIOVENTRICULAR BLOCK: Status: ACTIVE | Noted: 2023-06-01

## 2024-02-26 PROCEDURE — 1160F RVW MEDS BY RX/DR IN RCRD: CPT | Performed by: FAMILY MEDICINE

## 2024-02-26 PROCEDURE — 3078F DIAST BP <80 MM HG: CPT | Performed by: FAMILY MEDICINE

## 2024-02-26 PROCEDURE — 1159F MED LIST DOCD IN RCRD: CPT | Performed by: FAMILY MEDICINE

## 2024-02-26 PROCEDURE — 99215 OFFICE O/P EST HI 40 MIN: CPT | Performed by: FAMILY MEDICINE

## 2024-02-26 PROCEDURE — 3074F SYST BP LT 130 MM HG: CPT | Performed by: FAMILY MEDICINE

## 2024-02-26 PROCEDURE — 1036F TOBACCO NON-USER: CPT | Performed by: FAMILY MEDICINE

## 2024-02-26 PROCEDURE — 1157F ADVNC CARE PLAN IN RCRD: CPT | Performed by: FAMILY MEDICINE

## 2024-02-26 RX ORDER — METFORMIN HYDROCHLORIDE 500 MG/1
500 TABLET ORAL 4 TIMES DAILY
Qty: 360 TABLET | Refills: 3 | Status: SHIPPED | OUTPATIENT
Start: 2024-02-26 | End: 2025-02-25

## 2024-02-26 RX ORDER — BLOOD SUGAR DIAGNOSTIC
1 STRIP MISCELLANEOUS 4 TIMES DAILY
Qty: 100 STRIP | Refills: 3 | Status: SHIPPED | OUTPATIENT
Start: 2024-02-26 | End: 2024-02-29 | Stop reason: SDUPTHER

## 2024-02-26 RX ORDER — INSULIN LISPRO 100 [IU]/ML
INJECTION, SUSPENSION SUBCUTANEOUS
Qty: 3 ML | Refills: 3 | Status: CANCELLED | OUTPATIENT
Start: 2024-02-26

## 2024-02-26 RX ORDER — LOSARTAN POTASSIUM 100 MG/1
100 TABLET ORAL DAILY
Qty: 90 TABLET | Refills: 3 | Status: SHIPPED | OUTPATIENT
Start: 2024-02-26

## 2024-02-26 RX ORDER — LANOLIN ALCOHOL/MO/W.PET/CERES
6 CREAM (GRAM) TOPICAL DAILY
Status: SHIPPED | COMMUNITY
Start: 2024-02-26

## 2024-02-26 RX ORDER — AMLODIPINE BESYLATE 2.5 MG/1
2.5 TABLET ORAL DAILY
Qty: 90 TABLET | Refills: 3 | Status: CANCELLED | OUTPATIENT
Start: 2024-02-26

## 2024-02-26 RX ORDER — AMILORIDE HYDROCHLORIDE 5 MG/1
5 TABLET ORAL DAILY
Qty: 90 TABLET | Refills: 3 | Status: CANCELLED | OUTPATIENT
Start: 2024-02-26

## 2024-02-26 ASSESSMENT — PATIENT HEALTH QUESTIONNAIRE - PHQ9
SUM OF ALL RESPONSES TO PHQ9 QUESTIONS 1 AND 2: 0
1. LITTLE INTEREST OR PLEASURE IN DOING THINGS: NOT AT ALL
2. FEELING DOWN, DEPRESSED OR HOPELESS: NOT AT ALL

## 2024-02-26 ASSESSMENT — ANXIETY QUESTIONNAIRES
IF YOU CHECKED OFF ANY PROBLEMS ON THIS QUESTIONNAIRE, HOW DIFFICULT HAVE THESE PROBLEMS MADE IT FOR YOU TO DO YOUR WORK, TAKE CARE OF THINGS AT HOME, OR GET ALONG WITH OTHER PEOPLE: NOT DIFFICULT AT ALL
1. FEELING NERVOUS, ANXIOUS, OR ON EDGE: NOT AT ALL
7. FEELING AFRAID AS IF SOMETHING AWFUL MIGHT HAPPEN: NOT AT ALL
5. BEING SO RESTLESS THAT IT IS HARD TO SIT STILL: NOT AT ALL
3. WORRYING TOO MUCH ABOUT DIFFERENT THINGS: NOT AT ALL
4. TROUBLE RELAXING: NOT AT ALL
GAD7 TOTAL SCORE: 0
2. NOT BEING ABLE TO STOP OR CONTROL WORRYING: NOT AT ALL
6. BECOMING EASILY ANNOYED OR IRRITABLE: NOT AT ALL

## 2024-02-26 ASSESSMENT — ENCOUNTER SYMPTOMS
OCCASIONAL FEELINGS OF UNSTEADINESS: 0
ABDOMINAL PAIN: 1
LOSS OF SENSATION IN FEET: 0
DIARRHEA: 1
DEPRESSION: 0

## 2024-02-26 ASSESSMENT — COLUMBIA-SUICIDE SEVERITY RATING SCALE - C-SSRS
6. HAVE YOU EVER DONE ANYTHING, STARTED TO DO ANYTHING, OR PREPARED TO DO ANYTHING TO END YOUR LIFE?: NO
1. IN THE PAST MONTH, HAVE YOU WISHED YOU WERE DEAD OR WISHED YOU COULD GO TO SLEEP AND NOT WAKE UP?: NO
2. HAVE YOU ACTUALLY HAD ANY THOUGHTS OF KILLING YOURSELF?: NO

## 2024-02-26 NOTE — PROGRESS NOTES
Subjective   Patient ID: Jonah Luaren is a 83 y.o. male.    HPI  Raul is here to establish, he presents with wife. 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   Review of Systems   Gastrointestinal:  Positive for abdominal pain and diarrhea.   All other systems reviewed and are negative.  Exercises daily, weights. 200 sit ups.     Objective   Physical Exam  Vitals reviewed.   Constitutional:       Appearance: Normal appearance.   HENT:      Head: Normocephalic and atraumatic.      Right Ear: Tympanic membrane normal.      Left Ear: Tympanic membrane normal.      Nose: Nose normal.      Mouth/Throat:      Mouth: Mucous membranes are moist.      Pharynx: Oropharynx is clear.   Eyes:      Extraocular Movements: Extraocular movements intact.      Conjunctiva/sclera: Conjunctivae normal.      Pupils: Pupils are equal, round, and reactive to light.   Cardiovascular:      Rate and Rhythm: Normal rate and regular rhythm.      Pulses: Normal pulses.      Heart sounds: Normal heart sounds.   Pulmonary:      Effort: Pulmonary effort is normal.      Breath sounds: Normal breath sounds.   Abdominal:      General: Abdomen is flat. Bowel sounds are normal.      Palpations: Abdomen is soft.   Musculoskeletal:         General: Normal range of motion.      Cervical back: Normal range of motion and neck supple.   Skin:     General: Skin is warm and dry.      Capillary Refill: Capillary refill takes less than 2 seconds.   Neurological:      General: No focal deficit present.      Mental Status: He is alert and oriented to person, place, and time.   Psychiatric:         Mood and Affect: Mood normal.         Behavior: Behavior normal.         Assessment/Plan   Diagnoses and all orders for this visit:  Stage 3a chronic kidney disease (CMS/HCC)  Type 2 diabetes mellitus without complication, with long-term  current use of insulin (CMS/[facility])  -     blood sugar diagnostic (Accu-Chek Barbara Plus test strp) strip; Apply 1 each topically 4 times a day. Patient is to check blood sugar 4 times a day  -     metFORMIN (Glucophage) 500 mg tablet; Take 1 tablet (500 mg) by mouth 4 times a day.  AIVR (accelerated idioventricular rhythm) (CMS/[facility])  Type 2 diabetes mellitus with diabetic polyneuropathy, without long-term current use of insulin (CMS/[facility])  Benign essential hypertension  Paroxysmal atrial fibrillation (CMS/[facility])  Warfarin anticoagulation  Benign hypertension  -     losartan (Cozaar) 100 mg tablet; Take 1 tablet (100 mg) by mouth once daily.  Follow up six months, for Medicare Wellness.   Labs reviewed- touch bases with Dr. Vásquez about PSA  We will check A1C in six months.

## 2024-02-26 NOTE — PATIENT INSTRUCTIONS
Assessment/Plan   Diagnoses and all orders for this visit:  Stage 3a chronic kidney disease (CMS/[facility])  Type 2 diabetes mellitus without complication, with long-term current use of insulin (CMS/[facility])  -     blood sugar diagnostic (Accu-Chek Barbara Plus test strp) strip; Apply 1 each topically 4 times a day. Patient is to check blood sugar 4 times a day  -     metFORMIN (Glucophage) 500 mg tablet; Take 1 tablet (500 mg) by mouth 4 times a day.  AIVR (accelerated idioventricular rhythm) (CMS/[facility])  Type 2 diabetes mellitus with diabetic polyneuropathy, without long-term current use of insulin (CMS/[facility])  Benign essential hypertension  Paroxysmal atrial fibrillation (CMS/[facility])  Warfarin anticoagulation  Benign hypertension  -     losartan (Cozaar) 100 mg tablet; Take 1 tablet (100 mg) by mouth once daily.  Follow up six months, for Medicare Wellness.   Labs reviewed- touch bases with Dr. Vásquez about PSA  We will check A1C in six months.

## 2024-02-28 ENCOUNTER — TELEPHONE (OUTPATIENT)
Dept: PRIMARY CARE | Facility: CLINIC | Age: 84
End: 2024-02-28
Payer: MEDICARE

## 2024-02-28 DIAGNOSIS — E11.9 TYPE 2 DIABETES MELLITUS WITHOUT COMPLICATION, WITH LONG-TERM CURRENT USE OF INSULIN (MULTI): ICD-10-CM

## 2024-02-28 DIAGNOSIS — Z79.4 TYPE 2 DIABETES MELLITUS WITHOUT COMPLICATION, WITH LONG-TERM CURRENT USE OF INSULIN (MULTI): ICD-10-CM

## 2024-02-29 RX ORDER — BLOOD-GLUCOSE TRANSMITTER
EACH MISCELLANEOUS
Qty: 1 EACH | Refills: 0 | Status: SHIPPED | OUTPATIENT
Start: 2024-02-29

## 2024-02-29 RX ORDER — BLOOD-GLUCOSE SENSOR
EACH MISCELLANEOUS
Qty: 4 EACH | Refills: 6 | Status: SHIPPED | OUTPATIENT
Start: 2024-02-29

## 2024-02-29 RX ORDER — BLOOD SUGAR DIAGNOSTIC
1 STRIP MISCELLANEOUS 4 TIMES DAILY
Qty: 200 STRIP | Refills: 3 | Status: SHIPPED | OUTPATIENT
Start: 2024-02-29 | End: 2024-05-22 | Stop reason: SDUPTHER

## 2024-02-29 RX ORDER — LANCETS
EACH MISCELLANEOUS
Qty: 200 EACH | Refills: 3 | Status: SHIPPED | OUTPATIENT
Start: 2024-02-29

## 2024-03-01 NOTE — TELEPHONE ENCOUNTER
Done dexcom g 6 needs transmitter every 3 months, sensor every 10 days and  1 per  year all ordered together

## 2024-03-07 ENCOUNTER — ANTICOAGULATION - WARFARIN VISIT (OUTPATIENT)
Dept: PHARMACY | Facility: HOSPITAL | Age: 84
End: 2024-03-07
Payer: MEDICARE

## 2024-03-07 DIAGNOSIS — I48.19 PERSISTENT ATRIAL FIBRILLATION (MULTI): Primary | ICD-10-CM

## 2024-03-07 DIAGNOSIS — I48.0 PAROXYSMAL ATRIAL FIBRILLATION (MULTI): ICD-10-CM

## 2024-03-07 LAB
POC INR: 2.1 (ref 2–3)
POC PROTHROMBIN TIME: 24.6

## 2024-03-07 PROCEDURE — 85610 PROTHROMBIN TIME: CPT | Mod: QW

## 2024-03-07 NOTE — PATIENT INSTRUCTIONS
Your INR today is in range at 2.1. Since it is within range, will have you continue your dose to 5 mg every day. We will follow up in 8 weeks.  If any questions arise do not hesitate to call us at [phone] M-F from 8:30am-4:30pm or at   [phone] after 5pm or on the weekends. Continue to monitor for any excess bleeding or bruising, especially for blood in your stool.

## 2024-03-07 NOTE — PROGRESS NOTES
Mr. Lauren is a referral from Dr. Morgan for warfarin management of Afib (goal 2-3). He denies any changes in health. He denies any missed or extra doses. No signs or symptoms of bleeding reported. He does say he bruises very easily. No changes in his diet or his other medications. For his calf pain he said he still takes the Ana Paula's brand Leg Cramp PM, which is a quinine derivative product and it was discussed that this may increase the risk of bleeding with warfarin. He takes this sparingly. We discussed if he starts taking frequently to let the clinic know. Today his INR is 2.1, so we will have him continue taking his weekly regimen of 5mg daily. He has been very stable on his regimen of 5mg daily. We will follow up in 8 weeks.

## 2024-03-14 ENCOUNTER — OFFICE VISIT (OUTPATIENT)
Dept: UROLOGY | Facility: CLINIC | Age: 84
End: 2024-03-14
Payer: MEDICARE

## 2024-03-14 DIAGNOSIS — R97.20 ELEVATED PSA: Primary | ICD-10-CM

## 2024-03-14 DIAGNOSIS — N35.919 STRICTURE OF MALE URETHRA, UNSPECIFIED STRICTURE TYPE: ICD-10-CM

## 2024-03-14 DIAGNOSIS — N40.1 BENIGN PROSTATIC HYPERPLASIA WITH LOWER URINARY TRACT SYMPTOMS, SYMPTOM DETAILS UNSPECIFIED: ICD-10-CM

## 2024-03-14 LAB
POC BILIRUBIN, URINE: NEGATIVE
POC BLOOD, URINE: NEGATIVE
POC GLUCOSE, URINE: ABNORMAL MG/DL
POC KETONES, URINE: NEGATIVE MG/DL
POC LEUKOCYTES, URINE: NEGATIVE
POC NITRITE,URINE: NEGATIVE
POC PH, URINE: 6 PH
POC PROTEIN, URINE: NEGATIVE MG/DL
POC SPECIFIC GRAVITY, URINE: 1.01
POC UROBILINOGEN, URINE: 0.2 EU/DL

## 2024-03-14 PROCEDURE — 81003 URINALYSIS AUTO W/O SCOPE: CPT | Performed by: UROLOGY

## 2024-03-14 PROCEDURE — 1157F ADVNC CARE PLAN IN RCRD: CPT | Performed by: UROLOGY

## 2024-03-14 PROCEDURE — 1036F TOBACCO NON-USER: CPT | Performed by: UROLOGY

## 2024-03-14 PROCEDURE — 1160F RVW MEDS BY RX/DR IN RCRD: CPT | Performed by: UROLOGY

## 2024-03-14 PROCEDURE — 1159F MED LIST DOCD IN RCRD: CPT | Performed by: UROLOGY

## 2024-03-14 PROCEDURE — 99213 OFFICE O/P EST LOW 20 MIN: CPT | Performed by: UROLOGY

## 2024-03-14 NOTE — PROGRESS NOTES
03/14/2024  Voiding same, nocturia x 3    Patient has no nausea, no vomiting, no fever.    MARYANNE: Deferred    PSA slightly elevated 5.8    We discussed elevated PSA, prostate biopsy versus watchful waiting due to age  We discussed benign prostate hypertrophy, nocturia, cut back liquid intake after 6  All the questions were answered, the patient expressed understanding and agreed to the plan.    Impression   BPH  Elevated PSA  Nocturia    Plan  Conservative management for elevated PSA  Repeat PSA in 6 months  Appointment in 6 months  Chief Complaint   Patient presents with    Benign Prostatic Hypertrophy     Patient here for yearly check. Nocturia 3x. Increased urinary frequency and urgency. He is not taking alpha-blocker.     Scrotal Rash     Scrotal rash resolved. Patient has nystatin cream and triamcinolone cream on hand.         Physical Exam     TODAYS LAB RESULTS:    PSA 02/19/2024  5.80    POC Glucose, Urine  NEGATIVE mg/dl >=1000 (4+) Abnormal    POC Bilirubin, Urine  NEGATIVE NEGATIVE   POC Ketones, Urine  NEGATIVE mg/dl NEGATIVE   POC Specific Gravity, Urine  1.005 - 1.035 1.010   POC Blood, Urine  NEGATIVE NEGATIVE   POC PH, Urine  No Reference Range Established PH 6.0   POC Protein, Urine  NEGATIVE, 30 (1+) mg/dl NEGATIVE   POC Urobilinogen, Urine  0.2, 1.0 EU/DL 0.2   Poc Nitrite, Urine  NEGATIVE NEGATIVE   POC Leukocytes, Urine  NEGATIVE NEGATIVE     ASSESSMENT&PLAN:      IMPRESSIONS:       3/16/2023     Patient here today for yearly f/u, BPH. Patient had Scrotal rash last visit on 2/17/2022.  Last visit plan Keflex 500MG BID x7days, Conservative management,watch voiding,yearly MARYANNE.     PSA No more.     Voiding well, Nocturia x4, Patient complains of some Urinary frequency. Patient does not take any medication for his Prostate. PVR today is 38ML.     No Nausea, No Vomiting, No Fever.  Sonya Willingham LPN     Test Result Flag Reference Goal Last Verified    IO Glucose - Urine Negative REQUIRED    IO  Bilirubin Negative REQUIRED    IO Ketones Negative REQUIRED    IO Specific Gravity 1.015 REQUIRED    IO Blood Negative REQUIRED    IO pH 6.0 REQUIRED    IO Protein, Urine Negative REQUIRED    IO Urobilinogen Normal (0.2-1.0 mg/dl) REQUIRED    IO Nitrite, Urine Negative REQUIRED    IO Leukocytes Negative REQUIRED      We discussed benign prostate hypertrophy with moderate voiding symptoms, Flomax 0.4 mg 30-day trial  We discussed intermittent scrotal rash, continue nystatin cream and triamcinolone cream Keflex  All the questions were answered, the patient expressed understanding and agreed to the plan.     Impression  no urethral stricture  Scrotal rash  BPH  Recurrent urinary tract infection     Plan  Keflex 500 mg twice a day for 7 days, refill x3  continue nystatin cream and triamcinolone cream  Watch voiding  Yearly MARYANNE and appointment        03/17/2022  Voiding okay today     Patient has no nausea, no vomiting, no fever.     Patient here today for year check of BPH     Patient denies dysuria, burning, hematuria. He does admit to some urgency starting the past 2-3 days. He has nocturia x3, but states this is normal.      Patient states he would like to see about having a script of Keflex on hand in case of UTI symptoms.      PVR 160ml        IO Glucose - Urine Negative REQUIRED    IO Bilirubin Negative REQUIRED    IO Ketones Negative REQUIRED    IO Specific Gravity 1.005 REQUIRED    IO Blood Negative REQUIRED    IO pH 6.0 REQUIRED    IO Protein, Urine Negative REQUIRED    IO Urobilinogen Normal (0.2-1.0 mg/dl) REQUIRED    IO Nitrite, Urine Negative REQUIRED    IO Leukocytes Negative      We discussed cystoscopy findings, benign prostate hypertrophy, prostate tissue regrowth  We discussed the Keflex 500 mg twice a day for 7 days for UTI, then as needed  All the questions were answered, the patient expressed understanding and agreed to the plan.     Impression  no urethral stricture  Scrotal rash  BPH  Recurrent  urinary tract infection     Plan  Keflex 500 mg twice a day for 7 days, refill x3  Conservative management  Watch voiding  Yearly MARYANNE and appointment     I spent 25 minutes with this patient. Greater than 50% of this time was spent in counseling and/or coordination of care  03/11/21  Voiding well, nocturia Ã--1     Patient has no nausea, no vomiting, no fever.     MARYANNE: Deferred     PSA: No more     IO UA (automated w/o microscopy)           11Mar2021 01:05PM          Aaron Vásquez     Test Name       Result     Flag        Reference  IO Glucose - Urine         100 mg/dl                              IO Bilirubin       Negative                  IO Ketones      Negative                  IO Specific Gravity         1.020                       IO Blood          Negative                  IO pH               7.0                           IO Protein, Urine            Negative                  IO Urobilinogen                                              Normal (0.2-1.0 mg/dl)  IO Nitrite, Urine              Negative                  IO Leukocytes Negative                  IO Glucose - Urine         100 mg/dl                              IO Bilirubin       Negative                  IO Ketones      Negative                  IO Specific Gravity         1.020                       IO Blood          Negative                  IO pH               7.0                           IO Protein, Urine            Negative                  IO Urobilinogen                                              Normal (0.2-1.0 mg/dl)  IO Nitrite, Urine              Negative                  IO Leukocytes Negative                                                            We discussed cystoscopy findings, benign prostate hypertrophy, prostate tissue regrowth  We discussed the Keflex as needed  All the questions were answered, the patient expressed understanding and agreed to the plan.     Impression  no urethral stricture  Scrotal rash  BPH      Plan  Conservative management  Watch voiding  Keflex 500 mg twice day for 7 days, refill Ã--3 as needed        03/12/2020  Cystoscopy     Still complaining some urethral discharge during the night, voiding well during the day     A cystoscopy was performed under local without difficulty     Findings: Normal anterior urethra, mild prostate tissue regrowth, small post void residual no tumor no stone in the bladder     Pain evaluation: 0/10 before, 2/10 after.        IO UA (automated w/o microscopy)           12Mar2020 09:24AM          Aaron Vásquez     Test Name       Result     Flag        Reference  IO Glucose - Urine         100 mg/dl                              IO Bilirubin       Negative                  IO Ketones      Negative                  IO Specific Gravity         1.025                       IO Blood          Negative                  IO pH               5.5                           IO Protein, Urine            Negative                  IO Urobilinogen                                              Normal (0.2-1.0 mg/dl)  IO Nitrite, Urine              Negative                  IO Leukocytes Negative                  IO Glucose - Urine         100 mg/dl                              IO Bilirubin       Negative                  IO Ketones      Negative                  IO Specific Gravity         1.025                       IO Blood          Negative                  IO pH               5.5                           IO Protein, Urine            Negative                  IO Urobilinogen                                              Normal (0.2-1.0 mg/dl)  IO Nitrite, Urine              Negative                  IO Leukocytes Negative                     We discussed cystoscopy findings, benign prostate hypertrophy, prostate tissue regrowth  We discussed scrotal skin irritation, continue Lotrisone cream  We discussed the Keflex as needed  All the questions were answered, the patient expressed understanding and  agreed to the plan.     Impression  no urethral stricture  Scrotal rash  BPH     Plan  Conservative management  Watch voiding  continue Lotrisone cream as needed  Keflex 500 mg twice day for 7 days, refill Ã--3 as needed                                                     02/03/2020  Complaining urethral stricture symptoms, scrotal irritation etc. voiding well     Patient has no nausea, no vomiting, no fever.     Exam: Uncircumcised, normal penis, normal testes, minimal scrotal erythematous him a no discharge     IO Ultrasound, measurement post-void resid urine and/or bl cap; no imag         [id number] 04:01PM          Aaron Vásquez     Test Name       Result     Flag        Reference  IO Ultrasound, measurement post void resid urine and/or bl cap; non-imag       118 ml/min                                                                                                                    IO UA (automated w/o microscopy)           [id number] 03:53PM          Aaron Vásquez     Test Name       Result     Flag        Reference  IO Glucose - Urine         Negative                Normal  IO Bilirubin       Negative                Negative  IO Ketones      Negative                Negative  IO Specific Gravity         1.015                     1.000-1.030  IO Blood          Negative                Negative  IO pH               6.5                         5.0-8.0  IO Protein, Urine            Negative                Negative  IO Urobilinogen                                            Normal  Normal (0.2-1.0 mg/dl)  IO Nitrite, Urine              Negative                Negative  IO Leukocytes Negative                Negative  IO Glucose - Urine         Negative                Normal  IO Bilirubin       Negative                Negative  IO Ketones      Negative                Negative  IO Specific Gravity         1.015                     1.000-1.030  IO Blood          Negative                Negative  IO pH               6.5                          5.0-8.0  IO Protein, Urine            Negative                Negative  IO Urobilinogen                                            Normal  Normal (0.2-1.0 mg/dl)  IO Nitrite, Urine              Negative                Negative  IO Leukocytes Negative                Negative                                            We discussed the urethral stricture, cystoscopy  We discussed the scrotal skin rash, continue Lotrisone cream  All the questions were answered, the patient expressed understanding and agreed to the plan.        Impression  History of urethral stricture  Scrotal rash  BPH     Plan  Cystoscopy  continue Lotrisone cream as neede           01/09/2020  Complaining genitalia, prostate discomfort couple days ago, but resolved today     Scrotal irritation resolved after Lotrisone use     Exam: Genitalia, scrotal area unremarkable; MARYANNE: 1 pus, no nodule no tenderness     We discussed the benign prostate hypertrophy  We discussed scrotal irritation, prostatitis etc.  All the questions were answered, the patient expressed understanding and agreed to the plan.     Impression  Scrotal rashâ€“Much improved  BPH     Plan  continue Lotrisone cream as needed  Yearly MARYANNE and appointment           12/16/2019  Complaining scrotal burning and itching for several days; voiding fine, nocturia Ã--2     Patient has no nausea, no vomiting, no fever.     Exam: Uncircumcised, normal penis, mild erythema scrotum, normal testes     IO UA (automated w/o microscopy)           [id number] 04:17PM          Aaron Vásquez     Test Name       Result     Flag        Reference  IO Glucose - Urine         100 mg/dl                            Normal  IO Bilirubin       Negative                Negative  IO Ketones      Negative                Negative  IO Specific Gravity         1.020                     1.000-1.030  IO Blood          Negative                Negative  IO pH               6.0                         5.0-8.0  IO  Protein, Urine            Negative                Negative  IO Urobilinogen                                            Normal  Normal (0.2-1.0 mg/dl)  IO Nitrite, Urine              Negative                Negative  IO Leukocytes Negative                Negative  IO Glucose - Urine         100 mg/dl                            Normal  IO Bilirubin       Negative                Negative  IO Ketones      Negative                Negative  IO Specific Gravity         1.020                     1.000-1.030  IO Blood          Negative                Negative  IO pH               6.0                         5.0-8.0  IO Protein, Urine            Negative                Negative  IO Urobilinogen                                            Normal  Normal (0.2-1.0 mg/dl)  IO Nitrite, Urine              Negative                Negative  IO Leukocytes Negative                Negative                                            We discussed scrotal irritation, skin rash, Lotrisone cream trial  We discussed benign prostate hypertrophy mild voiding symptoms  All the questions were answered, the patient expressed understanding and agreed to the plan.     Impression  Scrotal rash  BPH     Plan  Lotrisone cream trial  Yearly MARYANNE and appointment           4/1/19 HBM   78-year-old male status post photo selective vaporization several years ago who is here for his annual checkup. The patient has a history of recurrent urinary tract infection and has a prescriptions with several refills at home of Ceftin 500 mg twice daily. He recently developed symptoms of a urinary tract infection with frequency and urinary urgency he called for prescription but since we had not seen him in over a year we advised that he come in for further evaluation. The patient states that his symptoms have significantly diminished and he has not taken any antibiotics he does have a history of diabetes and his blood sugars were elevated approximately a week ago but are  now closer to normal. He denies any blood or burning on urination he denies urgency or urinary incontinence.        PSA  2/19/2024 5.8    Surgery  2013 PVP several years ago

## 2024-03-22 ENCOUNTER — TELEPHONE (OUTPATIENT)
Dept: PRIMARY CARE | Facility: CLINIC | Age: 84
End: 2024-03-22
Payer: MEDICARE

## 2024-04-02 RX ORDER — INSULIN LISPRO 100 [IU]/ML
INJECTION, SUSPENSION SUBCUTANEOUS
Qty: 3 ML | Refills: 3 | Status: CANCELLED | OUTPATIENT
Start: 2024-04-02

## 2024-04-04 PROBLEM — Z79.4 TYPE 2 DIABETES MELLITUS WITH DIABETIC POLYNEUROPATHY, WITH LONG-TERM CURRENT USE OF INSULIN (MULTI): Status: ACTIVE | Noted: 2023-10-24

## 2024-04-10 ENCOUNTER — APPOINTMENT (OUTPATIENT)
Dept: PRIMARY CARE | Facility: CLINIC | Age: 84
End: 2024-04-10
Payer: MEDICARE

## 2024-04-12 ENCOUNTER — TELEPHONE (OUTPATIENT)
Dept: PRIMARY CARE | Facility: CLINIC | Age: 84
End: 2024-04-12
Payer: MEDICARE

## 2024-04-12 DIAGNOSIS — Z79.4 TYPE 2 DIABETES MELLITUS WITH DIABETIC POLYNEUROPATHY, WITH LONG-TERM CURRENT USE OF INSULIN (MULTI): ICD-10-CM

## 2024-04-12 DIAGNOSIS — E11.42 TYPE 2 DIABETES MELLITUS WITH DIABETIC POLYNEUROPATHY, WITH LONG-TERM CURRENT USE OF INSULIN (MULTI): ICD-10-CM

## 2024-04-12 DIAGNOSIS — E53.8 COBALAMIN DEFICIENCY: Primary | ICD-10-CM

## 2024-04-15 ENCOUNTER — TELEPHONE (OUTPATIENT)
Dept: PRIMARY CARE | Facility: CLINIC | Age: 84
End: 2024-04-15
Payer: MEDICARE

## 2024-04-15 DIAGNOSIS — Z79.4 TYPE 2 DIABETES MELLITUS WITHOUT COMPLICATION, WITH LONG-TERM CURRENT USE OF INSULIN (MULTI): ICD-10-CM

## 2024-04-15 DIAGNOSIS — E11.9 TYPE 2 DIABETES MELLITUS WITHOUT COMPLICATION, WITH LONG-TERM CURRENT USE OF INSULIN (MULTI): ICD-10-CM

## 2024-04-15 RX ORDER — INSULIN LISPRO 100 [IU]/ML
INJECTION, SUSPENSION SUBCUTANEOUS
Qty: 3 ML | Refills: 3 | Status: CANCELLED | OUTPATIENT
Start: 2024-04-15 | End: 2025-04-10

## 2024-04-15 NOTE — TELEPHONE ENCOUNTER
New prescription request from [facility] rx 75/25, but I need dose to send script. How much is he taking each time and I am assuming twice daily?

## 2024-04-16 RX ORDER — INSULIN LISPRO 100 [IU]/ML
17 INJECTION, SUSPENSION SUBCUTANEOUS
Qty: 33 ML | Refills: 3 | Status: SHIPPED | OUTPATIENT
Start: 2024-04-16 | End: 2025-04-16

## 2024-04-16 NOTE — TELEPHONE ENCOUNTER
Left message informing pt rx was sent, also informed pt to call back if he wants rx sent to local  pharmacy

## 2024-04-16 NOTE — TELEPHONE ENCOUNTER
Anna Fernandez MD   to Me    4/15/24  1:40 PM  There is no amount to give to patient- I need to know how many units and how often  
Anna Fernandez MD  Do [id number] Clinical Support StaffJust now (4:18 PM)     I sent med to [facility] and have another task that is he is going to run out I can send some locally too.     
Called patient and left message to call back  
Patient called advocacy and asked this med to be pended as [facility] does not have it.  Med pended.  I do now see his dosage so am pended as complete as I can.    
Patient notified. Has pens already and is good to wait on [facility].  
VITAL SIGNS: I have reviewed nursing notes and confirm.  CONSTITUTIONAL: Well-developed; well-nourished; in no acute distress.  SKIN: Skin is warm and dry. No rash.   HEAD: Normocephalic. + 4 cm linear superficial laceration to chin. No active bleeding, wound clean, no foreign body. +TTP  to chin and B/L TMJ. No bony instability, full ROM of jaw without popping, clicking or crepitus.    EYES: PERRL, EOM intact; conjunctiva and sclera clear.  ENT: No nasal discharge; airway clear.  NECK: Supple; non tender.  CARD: S1, S2 normal; no murmurs, gallops, or rubs. Regular rate and rhythm.  RESP: No wheezes, rales or rhonchi.  ABD: Normal bowel sounds; soft; non-distended; non-tender; no hepatosplenomegaly.  EXT: Normal ROM. No clubbing, cyanosis or edema.  NEURO: Alert, oriented. Grossly unremarkable. CNs intact. Normal Romberg. Normal finger to nose. No pronator drift. Normal rapi alternating movements/heal to shin. Sensation intact to all extremities. 5/5 strength to all extremities.   PSYCH: Cooperative, appropriate.

## 2024-04-17 ENCOUNTER — TELEPHONE (OUTPATIENT)
Dept: PRIMARY CARE | Facility: CLINIC | Age: 84
End: 2024-04-17
Payer: MEDICARE

## 2024-04-17 DIAGNOSIS — K40.90 RIGHT INGUINAL HERNIA: Primary | ICD-10-CM

## 2024-04-17 NOTE — TELEPHONE ENCOUNTER
He called possible hernia and would like a referral please advise. He is having pain in the swelling, pop feeling in the groin area.He would like to see someone today.

## 2024-04-23 ENCOUNTER — LAB (OUTPATIENT)
Dept: LAB | Facility: LAB | Age: 84
End: 2024-04-23
Payer: MEDICARE

## 2024-04-23 DIAGNOSIS — N18.31 CHRONIC KIDNEY DISEASE, STAGE 3A (MULTI): ICD-10-CM

## 2024-04-23 DIAGNOSIS — N18.31 CHRONIC KIDNEY DISEASE, STAGE 3A (MULTI): Primary | ICD-10-CM

## 2024-04-23 LAB
ALBUMIN SERPL BCP-MCNC: 4.1 G/DL (ref 3.4–5)
ANION GAP SERPL CALC-SCNC: 12 MMOL/L (ref 10–20)
BUN SERPL-MCNC: 26 MG/DL (ref 6–23)
CALCIUM SERPL-MCNC: 8.9 MG/DL (ref 8.6–10.3)
CHLORIDE SERPL-SCNC: 103 MMOL/L (ref 98–107)
CO2 SERPL-SCNC: 29 MMOL/L (ref 21–32)
CREAT SERPL-MCNC: 1.36 MG/DL (ref 0.5–1.3)
CREAT UR-MCNC: 28.2 MG/DL (ref 20–370)
EGFRCR SERPLBLD CKD-EPI 2021: 51 ML/MIN/1.73M*2
GLUCOSE SERPL-MCNC: 235 MG/DL (ref 74–99)
MAGNESIUM SERPL-MCNC: 2.04 MG/DL (ref 1.6–2.4)
MICROALBUMIN UR-MCNC: <7 MG/L
MICROALBUMIN/CREAT UR: NORMAL MG/G{CREAT}
PHOSPHATE SERPL-MCNC: 3.9 MG/DL (ref 2.5–4.9)
POTASSIUM SERPL-SCNC: 4.8 MMOL/L (ref 3.5–5.3)
PTH-INTACT SERPL-MCNC: 23.7 PG/ML (ref 18.5–88)
SODIUM SERPL-SCNC: 139 MMOL/L (ref 136–145)

## 2024-04-23 PROCEDURE — 36415 COLL VENOUS BLD VENIPUNCTURE: CPT

## 2024-04-23 PROCEDURE — 80069 RENAL FUNCTION PANEL: CPT

## 2024-04-23 PROCEDURE — 82043 UR ALBUMIN QUANTITATIVE: CPT

## 2024-04-23 PROCEDURE — 82570 ASSAY OF URINE CREATININE: CPT

## 2024-04-23 PROCEDURE — 83970 ASSAY OF PARATHORMONE: CPT

## 2024-04-23 PROCEDURE — 83735 ASSAY OF MAGNESIUM: CPT

## 2024-04-24 ENCOUNTER — OFFICE VISIT (OUTPATIENT)
Dept: SURGERY | Facility: CLINIC | Age: 84
End: 2024-04-24
Payer: MEDICARE

## 2024-04-24 VITALS
SYSTOLIC BLOOD PRESSURE: 164 MMHG | HEART RATE: 54 BPM | BODY MASS INDEX: 23.08 KG/M2 | DIASTOLIC BLOOD PRESSURE: 71 MMHG | OXYGEN SATURATION: 94 % | HEIGHT: 72 IN | WEIGHT: 170.4 LBS

## 2024-04-24 DIAGNOSIS — K42.9 UMBILICAL HERNIA WITHOUT OBSTRUCTION AND WITHOUT GANGRENE: ICD-10-CM

## 2024-04-24 DIAGNOSIS — M62.08 DIASTASIS OF RECTUS ABDOMINIS: ICD-10-CM

## 2024-04-24 DIAGNOSIS — Z79.01 CHRONIC ANTICOAGULATION: ICD-10-CM

## 2024-04-24 DIAGNOSIS — K40.90 INGUINAL HERNIA OF RIGHT SIDE WITHOUT OBSTRUCTION OR GANGRENE: Primary | ICD-10-CM

## 2024-04-24 PROCEDURE — 1157F ADVNC CARE PLAN IN RCRD: CPT | Performed by: SURGERY

## 2024-04-24 PROCEDURE — 99203 OFFICE O/P NEW LOW 30 MIN: CPT | Performed by: SURGERY

## 2024-04-24 PROCEDURE — 1036F TOBACCO NON-USER: CPT | Performed by: SURGERY

## 2024-04-24 PROCEDURE — 1159F MED LIST DOCD IN RCRD: CPT | Performed by: SURGERY

## 2024-04-24 PROCEDURE — 3077F SYST BP >= 140 MM HG: CPT | Performed by: SURGERY

## 2024-04-24 PROCEDURE — 1160F RVW MEDS BY RX/DR IN RCRD: CPT | Performed by: SURGERY

## 2024-04-24 PROCEDURE — 3078F DIAST BP <80 MM HG: CPT | Performed by: SURGERY

## 2024-04-24 RX ORDER — BISMUTH SUBSALICYLATE 262 MG
1 TABLET,CHEWABLE ORAL DAILY
COMMUNITY

## 2024-04-24 ASSESSMENT — ENCOUNTER SYMPTOMS
POLYPHAGIA: 0
DYSURIA: 0
CONSTIPATION: 0
ARTHRALGIAS: 0
WEAKNESS: 0
SPEECH DIFFICULTY: 0
EYE REDNESS: 0
CONFUSION: 0
FEVER: 0
FLANK PAIN: 0
NAUSEA: 0
WOUND: 0
VOMITING: 0
EYE PAIN: 0
HEADACHES: 0
CHILLS: 0
FATIGUE: 0
BRUISES/BLEEDS EASILY: 1
SHORTNESS OF BREATH: 0
MYALGIAS: 1
COUGH: 0
ABDOMINAL PAIN: 0
DIARRHEA: 0
HEMATURIA: 0
FREQUENCY: 1
AGITATION: 0

## 2024-04-24 NOTE — PROGRESS NOTES
GENERAL SURGERY OFFICE NOTE    Patient: Jonah Lauren    Age: 83 y.o.   Gender: male    MRN: [id number]    PCP: Anna Fernandez MD        SUBJECTIVE     Chief Complaint  New Patient Visit (Patient was referred by Dr. Fernandez for a right inguinal hernia. Patient has had the hernia for about 2 weeks. Patient states that he does heavy lifting around the house. Patient states that the hernia pops out during the day. )       KEISHA Mckenzie is an 83-year-old who I am seeing in consultation at the request of his primary care physician for evaluation of a right inguinal bulge.  He states for several months he has had some discomfort in his right inguinal area, especially when getting up from a seated position.  However, it was not until 2 weeks ago when he started to notice an intermittent bulge in the right inguinal region.  The bulge goes away when he lies in the supine position.  He has not had any obstructive symptoms.  He notes that he does a moderate amount of yard work and also exercises 5 times per week.  He does not have any change in bowel or bladder habits.  No obstructive symptoms.    ROS  Review of Systems   Constitutional:  Negative for chills, fatigue and fever.   HENT:  Negative for congestion, ear pain and hearing loss.    Eyes:  Negative for pain and redness.   Respiratory:  Negative for cough and shortness of breath.    Cardiovascular:  Negative for chest pain and leg swelling.   Gastrointestinal:  Negative for abdominal pain, constipation, diarrhea, nausea and vomiting.   Endocrine: Negative for polyphagia.   Genitourinary:  Positive for frequency. Negative for dysuria, flank pain and hematuria.   Musculoskeletal:  Positive for myalgias. Negative for arthralgias.   Skin:  Negative for rash and wound.   Allergic/Immunologic: Negative for immunocompromised state.   Neurological:  Negative for speech difficulty, weakness and headaches.   Hematological:  Bruises/bleeds easily.    Psychiatric/Behavioral:  Negative for agitation and confusion.           HISTORY     Past Medical History:   Diagnosis Date    Deficiency of other specified B group vitamins     Vitamin B12 deficiency    Hypomagnesemia 11/29/2018    Hypomagnesemia    Lightheadedness 06/01/2023    Personal history of other diseases of male genital organs 04/10/2019    History of benign prostatic hyperplasia    Personal history of other diseases of the digestive system     History of irritable bowel syndrome    Personal history of other endocrine, nutritional and metabolic disease     History of diabetes mellitus    Personal history of urinary (tract) infections 12/23/2021    History of urinary tract infection    Scrotal rash 06/01/2023    Weight loss, unintentional 06/01/2023        Past Surgical History:   Procedure Laterality Date    PROSTATE SURGERY  10/18/2016    Prostate Surgery    TONSILLECTOMY  10/18/2016    Tonsillectomy        Family History   Problem Relation Name Age of Onset    Bone cancer Mother          Spine    Heart attack Father      Cancer Sister      Other (Mesthelioma) Brother      Diabetes Brother          Allergies   Allergen Reactions    Atorvastatin Diarrhea and Other        Social History     Tobacco Use   Smoking Status Never   Smokeless Tobacco Never        Social History     Substance and Sexual Activity   Alcohol Use Never        HOME MEDICATIONS  Current Outpatient Medications   Medication Instructions    acetaminophen (TYLENOL) 500 mg, oral, 4 times daily    aMILoride (Midamor) 5 mg tablet 1 tablet, oral, Daily    amLODIPine (Norvasc) 2.5 mg tablet 1 tablet, oral, Daily    blood sugar diagnostic (Accu-Chek Barbara Plus test strp) strip 1 each, Topical, 4 times daily, Patient is to check blood sugar 4 times a day    cholecalciferol (Vitamin D-3) 25 MCG (1000 UT) tablet 3 tablets, oral, Daily    cyanocobalamin (Vitamin B-12) 500 mcg tablet 1 tablet, oral, Daily    Dexcom G6 Sensor device For monitoring in  "insulin dependent diabetic    Dexcom G6 Transmitter device Use as instructed    insulin lispro protamin-lispro (HumaLOG Mix 75-25) 100 unit/mL (75-25) injection 17 Units, subcutaneous, 2 times daily with meals    Jardiance 10 mg, oral, Daily    lancets misc To check sugars up to four times daily    losartan (COZAAR) 100 mg, oral, Daily    magnesium oxide (Mag-Ox) 400 mg (241.3 mg magnesium) tablet 6 tablets, oral, Daily    metFORMIN (GLUCOPHAGE) 500 mg, oral, 4 times daily    multivitamin tablet 1 tablet, oral, Daily    nystatin (Mycostatin) cream 2 times daily, APPLY A THIN LAYER TO AFFECTED AREA(S) AND RUB IN WELL TWICE DAILY.    pen needle, diabetic 32 gauge x 5/32\" needle 1 each, miscellaneous, 2 times daily    rosuvastatin (CRESTOR) 5 mg, oral, Daily    triamcinolone (Kenalog) 0.1 % cream 1 Application, Topical, 2 times daily    warfarin (Coumadin) 5 mg tablet TAKE 1-2 TABLETS BY MOUTH ONCE  DAILY AS DIRECTED BY COUMADIN  CLINIC          OBJECTIVE   Last Recorded Vitals.  Blood pressure 164/71, pulse 54, height 1.829 m (6'), weight 77.3 kg (170 lb 6.4 oz), SpO2 94%.     PHYSICAL EXAM  Physical Exam   General: Well-developed, well-nourished and in no acute distress.  Appears younger than stated age.  Head: Normocephalic. Atraumatic.  Neck/thyroid: Neck is supple.   Eyes: Pupils equal round and reactive to light. Conjunctiva normal.  ENMT: No masses or deformity of external nose. External ears without masses.  Respiratory/Chest:  Normal respiratory effort.  Cardiovascular: Regular rate and rhythm.   Abdomen: Soft, nontender, nondistended.  Moderate diastasis recti extending from the upper epigastric region to several centimeters below the umbilicus.  There is a 1 cm umbilical hernia fascial defect with reducible contents.  Rectal: Deferred  : Normal external genitalia.  In the standing position, there is a small right inguinal bulge which is reducible and nontender.  No obvious abnormality on the " left.  Musculoskeletal: Joints and limbs are grossly normal. Normal gait. Normal range of motion of major joints.  Neuro: Oriented to person, place and time. No obvious neurological deficit. Motor strength grossly normal.  Psych: Normal mood and affect.    RESULTS   Labs  No results found for this or any previous visit (from the past 24 hour(s)).    Radiology Resutls  No results found.       ASSESSMENT / PLAN   Diagnoses and all orders for this visit:  Inguinal hernia of right side without obstruction or gangrene  -     Referral to General Surgery  Umbilical hernia without obstruction and without gangrene  Diastasis of rectus abdominis  Chronic anticoagulation      Plan  1.  The benefits and risks of a laparoscopic right inguinal hernia repair with mesh surgery have been discussed with the patient.  The nature of the procedure was reviewed with the patient which included the risk and benefits of having surgery.  Alternative treatment options were reviewed. The risk included, but not limited to, infection, bleeding, injury surrounding organs, possible need for open procedure, possible need for another procedure, hernia formation at incision sites, recurrent hernias in the inguinal area, allergic reaction to medication and death.  If a hernia is identified on the left at the time of surgery, laparoscopic repair of a left inguinal hernia will also be performed.  Reviewed that the umbilical hernia would automatically be repaired at the time of the laparoscopic procedure since this will be the location of one of the port sites.  However, he will still have persistent diastasis recti.  He has had a previous TURP for BPH.  2.  Education material regarding inguinal hernias has been provided to the patient.  3.  Signs and symptoms of incarceration have been reviewed. If any of these symptoms develop, the patient was instructed to contact the office immediately.  4.  Before surgical intervention, he would need to come off of  "his Coumadin for 5 days preoperatively.  Reports that he does not require bridge therapy.  Will need INR on day of surgery.  With his chronic anticoagulation, he is a slightly higher risk of bleeding perioperatively.  5.  He was last seen by cardiology (Dr. Morgan) in November 2023.  He sees his cardiologist every 6 months.  He has a follow-up appointment in June 2024.  Would need \"cardiac clearance\"/risk stratification before surgery can be scheduled.  At this point, he has elected to wait until after his appointment in June 2024 with his cardiologist before making decisions regarding timing of surgical intervention.  If he does start having increased pain at the hernia site, he will call the office sooner, and will need to see if his cardiology appointment can be moved up.  6.  I will call the patient after his cardiac appointment in June to see if he is ready to schedule his hernia repair surgery.  7.  Patient may benefit from 24-hour observation postoperatively given his advanced age and other medical comorbidities.      Angelina Aguilar MD, FACS  [facility]  [address] [place] [address];   [address]; [address]  [place] [id number] [phone]  "

## 2024-04-24 NOTE — LETTER
April 24, 2024     Anna Fernandez MD  [address] [facility] [address] [facility] [address]  Counts include 234 beds at the [facility] [address]    Patient: Jonah Lauren   YOB: 1940   Date of Visit: 4/24/2024       Dear Dr. Anna Fernandez MD:    Thank you for referring Jonah Lauren to me for evaluation. Below are my notes for this consultation.  If you have questions, please do not hesitate to call me. I look forward to following your patient along with you.       Sincerely,     Angelina Aguliar MD      CC: No Recipients  ______________________________________________________________________________________        GENERAL SURGERY OFFICE NOTE    Patient: Jonah Lauren    Age: 83 y.o.   Gender: male    MRN: [id number]    PCP: Anna Fernandez MD        SUBJECTIVE     Chief Complaint  New Patient Visit (Patient was referred by Dr. Fernandez for a right inguinal hernia. Patient has had the hernia for about 2 weeks. Patient states that he does heavy lifting around the house. Patient states that the hernia pops out during the day. )       KEISHA Mckenzie is an 83-year-old who I am seeing in consultation at the request of his primary care physician for evaluation of a right inguinal bulge.  He states for several months he has had some discomfort in his right inguinal area, especially when getting up from a seated position.  However, it was not until 2 weeks ago when he started to notice an intermittent bulge in the right inguinal region.  The bulge goes away when he lies in the supine position.  He has not had any obstructive symptoms.  He notes that he does a moderate amount of yard work and also exercises 5 times per week.  He does not have any change in bowel or bladder habits.  No obstructive symptoms.    ROS  Review of Systems   Constitutional:  Negative for chills, fatigue and fever.   HENT:  Negative for congestion, ear pain and hearing loss.    Eyes:  Negative for pain and redness.   Respiratory:  Negative  for cough and shortness of breath.    Cardiovascular:  Negative for chest pain and leg swelling.   Gastrointestinal:  Negative for abdominal pain, constipation, diarrhea, nausea and vomiting.   Endocrine: Negative for polyphagia.   Genitourinary:  Positive for frequency. Negative for dysuria, flank pain and hematuria.   Musculoskeletal:  Positive for myalgias. Negative for arthralgias.   Skin:  Negative for rash and wound.   Allergic/Immunologic: Negative for immunocompromised state.   Neurological:  Negative for speech difficulty, weakness and headaches.   Hematological:  Bruises/bleeds easily.   Psychiatric/Behavioral:  Negative for agitation and confusion.           HISTORY     Past Medical History:   Diagnosis Date   • Deficiency of other specified B group vitamins     Vitamin B12 deficiency   • Hypomagnesemia 11/29/2018    Hypomagnesemia   • Lightheadedness 06/01/2023   • Personal history of other diseases of male genital organs 04/10/2019    History of benign prostatic hyperplasia   • Personal history of other diseases of the digestive system     History of irritable bowel syndrome   • Personal history of other endocrine, nutritional and metabolic disease     History of diabetes mellitus   • Personal history of urinary (tract) infections 12/23/2021    History of urinary tract infection   • Scrotal rash 06/01/2023   • Weight loss, unintentional 06/01/2023        Past Surgical History:   Procedure Laterality Date   • PROSTATE SURGERY  10/18/2016    Prostate Surgery   • TONSILLECTOMY  10/18/2016    Tonsillectomy        Family History   Problem Relation Name Age of Onset   • Bone cancer Mother          Spine   • Heart attack Father     • Cancer Sister     • Other (Mesthelioma) Brother     • Diabetes Brother          Allergies   Allergen Reactions   • Atorvastatin Diarrhea and Other        Social History     Tobacco Use   Smoking Status Never   Smokeless Tobacco Never        Social History     Substance and Sexual  "Activity   Alcohol Use Never        HOME MEDICATIONS  Current Outpatient Medications   Medication Instructions   • acetaminophen (TYLENOL) 500 mg, oral, 4 times daily   • aMILoride (Midamor) 5 mg tablet 1 tablet, oral, Daily   • amLODIPine (Norvasc) 2.5 mg tablet 1 tablet, oral, Daily   • blood sugar diagnostic (Accu-Chek Barbara Plus test strp) strip 1 each, Topical, 4 times daily, Patient is to check blood sugar 4 times a day   • cholecalciferol (Vitamin D-3) 25 MCG (1000 UT) tablet 3 tablets, oral, Daily   • cyanocobalamin (Vitamin B-12) 500 mcg tablet 1 tablet, oral, Daily   • Dexcom G6 Sensor device For monitoring in insulin dependent diabetic   • Dexcom G6 Transmitter device Use as instructed   • insulin lispro protamin-lispro (HumaLOG Mix 75-25) 100 unit/mL (75-25) injection 17 Units, subcutaneous, 2 times daily with meals   • Jardiance 10 mg, oral, Daily   • lancets misc To check sugars up to four times daily   • losartan (COZAAR) 100 mg, oral, Daily   • magnesium oxide (Mag-Ox) 400 mg (241.3 mg magnesium) tablet 6 tablets, oral, Daily   • metFORMIN (GLUCOPHAGE) 500 mg, oral, 4 times daily   • multivitamin tablet 1 tablet, oral, Daily   • nystatin (Mycostatin) cream 2 times daily, APPLY A THIN LAYER TO AFFECTED AREA(S) AND RUB IN WELL TWICE DAILY.   • pen needle, diabetic 32 gauge x 5/32\" needle 1 each, miscellaneous, 2 times daily   • rosuvastatin (CRESTOR) 5 mg, oral, Daily   • triamcinolone (Kenalog) 0.1 % cream 1 Application, Topical, 2 times daily   • warfarin (Coumadin) 5 mg tablet TAKE 1-2 TABLETS BY MOUTH ONCE  DAILY AS DIRECTED BY COUMADIN  CLINIC          OBJECTIVE   Last Recorded Vitals.  Blood pressure 164/71, pulse 54, height 1.829 m (6'), weight 77.3 kg (170 lb 6.4 oz), SpO2 94%.     PHYSICAL EXAM  Physical Exam   General: Well-developed, well-nourished and in no acute distress.  Appears younger than stated age.  Head: Normocephalic. Atraumatic.  Neck/thyroid: Neck is supple.   Eyes: Pupils equal " round and reactive to light. Conjunctiva normal.  ENMT: No masses or deformity of external nose. External ears without masses.  Respiratory/Chest:  Normal respiratory effort.  Cardiovascular: Regular rate and rhythm.   Abdomen: Soft, nontender, nondistended.  Moderate diastasis recti extending from the upper epigastric region to several centimeters below the umbilicus.  There is a 1 cm umbilical hernia fascial defect with reducible contents.  Rectal: Deferred  : Normal external genitalia.  In the standing position, there is a small right inguinal bulge which is reducible and nontender.  No obvious abnormality on the left.  Musculoskeletal: Joints and limbs are grossly normal. Normal gait. Normal range of motion of major joints.  Neuro: Oriented to person, place and time. No obvious neurological deficit. Motor strength grossly normal.  Psych: Normal mood and affect.    RESULTS   Labs  No results found for this or any previous visit (from the past 24 hour(s)).    Radiology Resutls  No results found.       ASSESSMENT / PLAN   Diagnoses and all orders for this visit:  Inguinal hernia of right side without obstruction or gangrene  -     Referral to General Surgery  Umbilical hernia without obstruction and without gangrene  Diastasis of rectus abdominis  Chronic anticoagulation      Plan  1.  The benefits and risks of a laparoscopic right inguinal hernia repair with mesh surgery have been discussed with the patient.  The nature of the procedure was reviewed with the patient which included the risk and benefits of having surgery.  Alternative treatment options were reviewed. The risk included, but not limited to, infection, bleeding, injury surrounding organs, possible need for open procedure, possible need for another procedure, hernia formation at incision sites, recurrent hernias in the inguinal area, allergic reaction to medication and death.  If a hernia is identified on the left at the time of surgery, laparoscopic  "repair of a left inguinal hernia will also be performed.  Reviewed that the umbilical hernia would automatically be repaired at the time of the laparoscopic procedure since this will be the location of one of the port sites.  However, he will still have persistent diastasis recti.  He has had a previous TURP for BPH.  2.  Education material regarding inguinal hernias has been provided to the patient.  3.  Signs and symptoms of incarceration have been reviewed. If any of these symptoms develop, the patient was instructed to contact the office immediately.  4.  Before surgical intervention, he would need to come off of his Coumadin for 5 days preoperatively.  Reports that he does not require bridge therapy.  Will need INR on day of surgery.  With his chronic anticoagulation, he is a slightly higher risk of bleeding perioperatively.  5.  He was last seen by cardiology (Dr. Morgan) in November 2023.  He sees his cardiologist every 6 months.  He has a follow-up appointment in June 2024.  Would need \"cardiac clearance\"/risk stratification before surgery can be scheduled.  At this point, he has elected to wait until after his appointment in June 2024 with his cardiologist before making decisions regarding timing of surgical intervention.  If he does start having increased pain at the hernia site, he will call the office sooner, and will need to see if his cardiology appointment can be moved up.  6.  I will call the patient after his cardiac appointment in June to see if he is ready to schedule his hernia repair surgery.  7.  Patient may benefit from 24-hour observation postoperatively given his advanced age and other medical comorbidities.      Angelina Aguilar MD, FACS   [facility]  [address] [place] [address];   [address]  [place]  [address] [phone]    "

## 2024-04-24 NOTE — PATIENT INSTRUCTIONS
1.  Activity as tolerated.  Avoid any activity that causes pain at your hernia site.  2.  If you do have pain at your hernia site, lie down and apply ice to your right groin area.  You may also do a gentle massage to the right groin to help reduce the lump.  However, if the pain does not improve within 1 hour, go immediately to the emergency room.  3.  Keep your follow-up appointment with your cardiologist (heart doctor) in June.  Dr. Aguilar will contact you after your cardiology appointment to see if you would like to pursue repair of your hernia.  If you start having problems with your hernia before this, please call Dr. Aguilar's office to discuss timing of hernia surgery.  [phone]

## 2024-04-29 ENCOUNTER — TELEPHONE (OUTPATIENT)
Dept: CARDIOLOGY | Facility: CLINIC | Age: 84
End: 2024-04-29
Payer: MEDICARE

## 2024-04-29 NOTE — TELEPHONE ENCOUNTER
The patient called to confirm his appointment. He was scheduled on 5/20/2024 with Audi Barba but the appointment was changed to 6/10/2024 at 13:30. I called the patient to discuss but did not reach. I left a vm letting him know the new date and time and asked him to call back if the appointment did not work with his schedule.

## 2024-05-02 ENCOUNTER — ANTICOAGULATION - WARFARIN VISIT (OUTPATIENT)
Dept: PHARMACY | Facility: HOSPITAL | Age: 84
End: 2024-05-02
Payer: MEDICARE

## 2024-05-02 DIAGNOSIS — I48.19 PERSISTENT ATRIAL FIBRILLATION (MULTI): Primary | ICD-10-CM

## 2024-05-02 DIAGNOSIS — I48.0 PAROXYSMAL ATRIAL FIBRILLATION (MULTI): ICD-10-CM

## 2024-05-02 LAB
POC INR: 2.2 (ref 2–3)
POC PROTHROMBIN TIME: 26.5

## 2024-05-02 PROCEDURE — 85610 PROTHROMBIN TIME: CPT | Mod: QW

## 2024-05-02 NOTE — PROGRESS NOTES
Mr. Lauren is a referral from Dr. Morgan for warfarin management of Afib (goal 2-3). He denies any changes in health. He denies any missed or extra doses. No signs or symptoms of bleeding reported. He does say he bruises very easily but does not have any currently. No changes in his diet or his other medications. For his calf pain he said he still takes the Ana Paula's brand Leg Cramp PM, which is a quinine derivative product and it was discussed that this may increase the risk of bleeding with warfarin. He takes this sparingly and only used 2 pills within the past 2 months. We discussed if he starts taking frequently to let the clinic know. Today his INR is 2.2, so we will have him continue taking his weekly regimen of 5mg daily. He has been very stable on his regimen of 5mg daily. We will follow up in 8 weeks. He is to have a dental extraction and hernia surgery in the upcoming months. He will need to hold the warfarin for both of those procedures. We discussed once he gets the clearance to let our clinic know and once he has those procedures scheduled so we can discuss the plan for holding the warfarin.

## 2024-05-02 NOTE — PATIENT INSTRUCTIONS
Your INR today is in range at 2.2. Since your INR is in range, will have you continue your dose of 5 mg every day. We will follow up in 8 weeks. Please call the clinic once you have set up the tooth extraction and hernia surgery appointments.    If any questions arise do not hesitate to call us at [phone] M-F from 8:30am-4:30pm or at   [phone] after 5pm or on the weekends. Continue to monitor for any excess bleeding or bruising, especially for blood in your stool.

## 2024-05-17 ENCOUNTER — TELEPHONE (OUTPATIENT)
Dept: PHARMACY | Facility: HOSPITAL | Age: 84
End: 2024-05-17
Payer: MEDICARE

## 2024-05-17 NOTE — TELEPHONE ENCOUNTER
Mr. Lauren called stating that he has an upcoming tooth extraction that he needs to hold the warfarin for 5 days. We discussed he needs to have cardiac clearance for holding the warfarin. He also stated they have not scheduled the hernia surgery but he will need to hold for that as well. We discussed to call the cardiology office. We initially had for a follow up appointment of June 27th. We discussed we would follow up a week after the tooth extraction.

## 2024-05-20 ENCOUNTER — APPOINTMENT (OUTPATIENT)
Dept: CARDIOLOGY | Facility: CLINIC | Age: 84
End: 2024-05-20
Payer: MEDICARE

## 2024-05-22 ENCOUNTER — TELEPHONE (OUTPATIENT)
Dept: PRIMARY CARE | Facility: CLINIC | Age: 84
End: 2024-05-22
Payer: MEDICARE

## 2024-05-22 DIAGNOSIS — E11.9 TYPE 2 DIABETES MELLITUS WITHOUT COMPLICATION, WITH LONG-TERM CURRENT USE OF INSULIN (MULTI): ICD-10-CM

## 2024-05-22 DIAGNOSIS — Z79.4 TYPE 2 DIABETES MELLITUS WITHOUT COMPLICATION, WITH LONG-TERM CURRENT USE OF INSULIN (MULTI): ICD-10-CM

## 2024-05-22 RX ORDER — BLOOD SUGAR DIAGNOSTIC
1 STRIP MISCELLANEOUS 4 TIMES DAILY
Qty: 300 STRIP | Refills: 3 | Status: SHIPPED | OUTPATIENT
Start: 2024-05-22 | End: 2025-05-22

## 2024-05-22 NOTE — TELEPHONE ENCOUNTER
Patient called and said that he ordered 400 strips in February, only received 100   Says he still is supposed to get 300  (Working on the last 50 from the 100) he received  Asking if we can send in a script for the strips  (Barbara Test Strips)   [facility] in [place]

## 2024-05-23 PROBLEM — I12.9 HYPERTENSIVE CHRONIC KIDNEY DISEASE WITH STAGE 1 THROUGH STAGE 4 CHRONIC KIDNEY DISEASE, OR UNSPECIFIED CHRONIC KIDNEY DISEASE: Status: ACTIVE | Noted: 2024-05-23

## 2024-05-23 PROBLEM — E11.42 TYPE 2 DIABETES MELLITUS WITH DIABETIC POLYNEUROPATHY (MULTI): Status: ACTIVE | Noted: 2024-05-23

## 2024-05-23 PROBLEM — E83.42 HYPOMAGNESEMIA: Status: ACTIVE | Noted: 2024-05-23

## 2024-05-23 PROBLEM — N18.30 STAGE 3 CHRONIC KIDNEY DISEASE (MULTI): Status: ACTIVE | Noted: 2024-05-23

## 2024-05-23 PROBLEM — E87.6 HYPOKALEMIA: Status: ACTIVE | Noted: 2024-05-23

## 2024-05-23 PROBLEM — N18.2 CKD (CHRONIC KIDNEY DISEASE) STAGE 2, GFR 60-89 ML/MIN: Status: ACTIVE | Noted: 2024-05-23

## 2024-05-23 PROBLEM — I10 ESSENTIAL HYPERTENSION: Status: ACTIVE | Noted: 2024-05-23

## 2024-05-23 RX ORDER — LOPERAMIDE HYDROCHLORIDE 2 MG/1
2 CAPSULE ORAL 4 TIMES DAILY PRN
COMMUNITY
Start: 2024-04-23

## 2024-05-31 ENCOUNTER — ANTICOAGULATION - WARFARIN VISIT (OUTPATIENT)
Dept: PHARMACY | Facility: HOSPITAL | Age: 84
End: 2024-05-31
Payer: MEDICARE

## 2024-05-31 DIAGNOSIS — I48.0 PAROXYSMAL ATRIAL FIBRILLATION (MULTI): ICD-10-CM

## 2024-05-31 DIAGNOSIS — I48.19 PERSISTENT ATRIAL FIBRILLATION (MULTI): Primary | ICD-10-CM

## 2024-05-31 LAB
POC INR: 1.9 (ref 2–3)
POC PROTHROMBIN TIME: 22.9

## 2024-05-31 PROCEDURE — 85610 PROTHROMBIN TIME: CPT | Mod: QW | Performed by: INTERNAL MEDICINE

## 2024-05-31 PROCEDURE — 99211 OFF/OP EST MAY X REQ PHY/QHP: CPT

## 2024-05-31 NOTE — PATIENT INSTRUCTIONS
Your INR today is low at 1.9. Since your INR is slightly low please boost today with 7.5mg and then have you continue your dose of 5 mg every day. We will follow up in 2 weeks.    If any questions arise do not hesitate to call us at [phone] M-F from 8:30am-4:30pm or at   [phone] after 5pm or on the weekends. Continue to monitor for any excess bleeding or bruising, especially for blood in your stool.

## 2024-05-31 NOTE — PROGRESS NOTES
Mr. Lauren is a referral from Dr. Morgan for warfarin management of Afib (goal 2-3). He denies any changes in health. He denies any missed or extra doses. No signs or symptoms of bleeding reported. No changes in his diet or his other medications. For his calf pain he said he still takes the Ana Paula's brand Leg Cramp PM, which is a quinine derivative product and it was discussed that this may increase the risk of bleeding with warfarin. We discussed if he starts taking frequently to let the clinic know. Today his INR is 1.9. He stated he had a tooth pulled and held for 6 days total and restarted it last Friday. He took 7.5mg Friday and Saturday and then continued his weekly regimen. He is also to have hernia surgery in the upcoming months. He will need to hold the warfarin for that procedure. We discussed once he gets the clearance to let our clinic know and once that hernia surgery appointment is made. Given his INR is still slightly low, we will boost today with 7.5mg and then continue 5mg daily. We will follow up in about 2 weeks.

## 2024-06-10 ENCOUNTER — OFFICE VISIT (OUTPATIENT)
Dept: CARDIOLOGY | Facility: CLINIC | Age: 84
End: 2024-06-10
Payer: MEDICARE

## 2024-06-10 ENCOUNTER — ANTICOAGULATION - WARFARIN VISIT (OUTPATIENT)
Dept: PHARMACY | Facility: HOSPITAL | Age: 84
End: 2024-06-10
Payer: MEDICARE

## 2024-06-10 VITALS
WEIGHT: 169 LBS | OXYGEN SATURATION: 96 % | SYSTOLIC BLOOD PRESSURE: 110 MMHG | DIASTOLIC BLOOD PRESSURE: 60 MMHG | HEART RATE: 60 BPM | HEIGHT: 72 IN | BODY MASS INDEX: 22.89 KG/M2

## 2024-06-10 DIAGNOSIS — I48.19 PERSISTENT ATRIAL FIBRILLATION (MULTI): Primary | ICD-10-CM

## 2024-06-10 DIAGNOSIS — I48.0 PAROXYSMAL ATRIAL FIBRILLATION (MULTI): ICD-10-CM

## 2024-06-10 DIAGNOSIS — I10 BENIGN HYPERTENSION: Primary | ICD-10-CM

## 2024-06-10 DIAGNOSIS — I10 ESSENTIAL HYPERTENSION: ICD-10-CM

## 2024-06-10 DIAGNOSIS — I48.19 PERSISTENT ATRIAL FIBRILLATION (MULTI): ICD-10-CM

## 2024-06-10 LAB
POC INR: 1.9 (ref 2–3)
POC PROTHROMBIN TIME: 23.3

## 2024-06-10 PROCEDURE — 1159F MED LIST DOCD IN RCRD: CPT | Performed by: PHYSICIAN ASSISTANT

## 2024-06-10 PROCEDURE — 85610 PROTHROMBIN TIME: CPT | Mod: QW | Performed by: INTERNAL MEDICINE

## 2024-06-10 PROCEDURE — 1160F RVW MEDS BY RX/DR IN RCRD: CPT | Performed by: PHYSICIAN ASSISTANT

## 2024-06-10 PROCEDURE — 1036F TOBACCO NON-USER: CPT | Performed by: PHYSICIAN ASSISTANT

## 2024-06-10 PROCEDURE — 3078F DIAST BP <80 MM HG: CPT | Performed by: PHYSICIAN ASSISTANT

## 2024-06-10 PROCEDURE — 99213 OFFICE O/P EST LOW 20 MIN: CPT | Performed by: PHYSICIAN ASSISTANT

## 2024-06-10 PROCEDURE — 1157F ADVNC CARE PLAN IN RCRD: CPT | Performed by: PHYSICIAN ASSISTANT

## 2024-06-10 PROCEDURE — 99211 OFF/OP EST MAY X REQ PHY/QHP: CPT

## 2024-06-10 PROCEDURE — 3074F SYST BP LT 130 MM HG: CPT | Performed by: PHYSICIAN ASSISTANT

## 2024-06-10 ASSESSMENT — ENCOUNTER SYMPTOMS
WHEEZING: 0
WEAKNESS: 0
SHORTNESS OF BREATH: 0
NAUSEA: 0
DYSURIA: 0
FEVER: 0
ABDOMINAL PAIN: 0
VOMITING: 0
DIARRHEA: 0
PALPITATIONS: 0
ORTHOPNEA: 0

## 2024-06-10 NOTE — PROGRESS NOTES
Mr. Lauren is a referral from Dr. Morgan for warfarin management of Afib (goal 2-3). He denies any changes in health. He denies any missed or extra doses. No signs or symptoms of bleeding reported. No changes in his diet or his other medications. For his calf pain he said he still takes the Ana Paula's brand Leg Cramp PM, which is a quinine derivative product and it was discussed that this may increase the risk of bleeding with warfarin. We discussed if he starts taking frequently to let the clinic know. He is also to have hernia surgery in the upcoming months. He will need to hold the warfarin for that procedure. We discussed once he gets the clearance to let our clinic know and once that hernia surgery appointment is made. Given his INR is still slightly low at 1.9, we will increase the weekly regimen to 7.5mg on Monday and 5mg all other days. We will follow up in about 2 weeks.

## 2024-06-10 NOTE — PROGRESS NOTES
Cardiology Follow Up  Chief Complaint:   Patient is here for 6 month office visit.      History Of Present Illness:    This is an 82-year-old male here for follow-up regarding his history of persistent atrial fibrillation, warfarin anticoagulation managed through the anticoagulation clinic, accelerated idioventricular rhythm, mild to moderate mitral valve regurgitation and, hypertension, and dyslipidemia.  The patient was last evaluated by me in October 2022.  At that visit I had referred him to the anticoagulation clinic and asked that he follow-up in 6 months.  Patient was subsequently seen by the cardiology PA most recently in May 2023 at which time atorvastatin was discontinued and he was started on rosuvastatin 5 mg daily.  Follow-up lipid panel was ordered for 6 months.  BMP done in October 2023 showed normal serum sodium and potassium with a serum creatinine of 1.22, hemoglobin A1c was 7.3%.  CBC done in June 2023 showed a hemoglobin of 12.9.  Lipid panel done in November 2023 showed an LDL cholesterol of 42 and triglycerides of 122 while on rosuvastatin 5 mg daily. ECG done today showed atrial fibrillation with a heart rate of 66 bpm.  The patient reports dizziness/lightheadedness when quickly changing positions. He denies any new chest pain, shortness of breath, and palpitations. He states that he takes all of his medications as prescribed. During my exam, he was resting comfortably on the exam table.   6-10-24: Is a very pleasant 83-year-old patient known to Dr. Morgan.  He presents for 6-month follow-up.  Patient denies any cardiac complaints or concerns he tells me that he has a right inguinal hernia that will need to be repaired which will be done laparoscopically.  He denies chest pain palpitations shortness of breath.  He is concerned about his weight however when we go back to years to August 2022 his weight was right around 169-170 pounds so has been very stable.  He eats very very well and exercises  at the gym 5 days a week.  Again he voices no other new cardiac complaints or concerns.  Denies need for any medication refills.     Last Recorded Vitals:  Vitals:    06/10/24 1331   BP: 110/60   BP Location: Right arm   Patient Position: Sitting   Pulse: 60   SpO2: 96%   Weight: 76.7 kg (169 lb)   Height: 1.829 m (6')       Past Medical History:  He has a past medical history of Deficiency of other specified B group vitamins, Hypomagnesemia (11/29/2018), Lightheadedness (06/01/2023), Personal history of other diseases of male genital organs (04/10/2019), Personal history of other diseases of the digestive system, Personal history of other endocrine, nutritional and metabolic disease, Personal history of urinary (tract) infections (12/23/2021), Scrotal rash (06/01/2023), and Weight loss, unintentional (06/01/2023).    Past Surgical History:  He has a past surgical history that includes Prostate surgery (10/18/2016) and Tonsillectomy (10/18/2016).      Social History:  He reports that he has never smoked. He has never used smokeless tobacco. He reports that he does not drink alcohol and does not use drugs.    Family History:  Family History   Problem Relation Name Age of Onset    Bone cancer Mother          Spine    Heart attack Father      Cancer Sister      Other (Mesthelioma) Brother      Diabetes Brother          Allergies:  Atorvastatin    Outpatient Medications:  Current Outpatient Medications   Medication Instructions    acetaminophen (TYLENOL) 500 mg, oral, 4 times daily    aMILoride (Midamor) 5 mg tablet 1 tablet, oral, Daily    amLODIPine (Norvasc) 2.5 mg tablet 1 tablet, oral, Daily    blood sugar diagnostic (Accu-Chek Barbara Plus test strp) strip 1 each, Topical, 4 times daily, Patient is to check blood sugar 4 times a day    cholecalciferol (Vitamin D-3) 25 MCG (1000 UT) tablet 3 tablets, oral, Daily    cyanocobalamin (Vitamin B-12) 500 mcg tablet 1 tablet, oral, Daily    Dexcom G6 Sensor device For  "monitoring in insulin dependent diabetic    Dexcom G6 Transmitter device Use as instructed    insulin lispro protamin-lispro (HumaLOG Mix 75-25) 100 unit/mL (75-25) injection 17 Units, subcutaneous, 2 times daily (morning and late afternoon)    Jardiance 10 mg, oral, Daily    lancets misc To check sugars up to four times daily    loperamide (IMODIUM) 2 mg, oral, 4 times daily PRN    losartan (COZAAR) 100 mg, oral, Daily    magnesium oxide (Mag-Ox) 400 mg (241.3 mg magnesium) tablet 6 tablets, oral, Daily    metFORMIN (GLUCOPHAGE) 500 mg, oral, 4 times daily    multivitamin tablet 1 tablet, oral, Daily    nystatin (Mycostatin) cream 2 times daily, APPLY A THIN LAYER TO AFFECTED AREA(S) AND RUB IN WELL TWICE DAILY.    pen needle, diabetic 32 gauge x 5/32\" needle 1 each, miscellaneous, 2 times daily    rosuvastatin (CRESTOR) 5 mg, oral, Daily    triamcinolone (Kenalog) 0.1 % cream 1 Application, Topical, 2 times daily    warfarin (Coumadin) 5 mg tablet TAKE 1-2 TABLETS BY MOUTH ONCE  DAILY AS DIRECTED BY COUMADIN  CLINIC     Review of Systems   Constitutional: Negative for fever and malaise/fatigue.   Cardiovascular:  Negative for chest pain, orthopnea and palpitations.   Respiratory:  Negative for shortness of breath and wheezing.    Endocrine:        Blood sugars well controlled   Skin:  Negative for itching and rash.   Gastrointestinal:  Negative for abdominal pain, diarrhea, nausea and vomiting.   Genitourinary:  Negative for dysuria.   Neurological:  Negative for weakness.      Physical Exam  Constitutional:       General: He is not in acute distress.  HENT:      Mouth/Throat:      Mouth: Mucous membranes are moist.   Cardiovascular:      Rate and Rhythm: Normal rate and regular rhythm.      Heart sounds: Normal heart sounds. No murmur heard.  Abdominal:      General: Abdomen is flat. Bowel sounds are normal.      Palpations: Abdomen is soft.   Musculoskeletal:         General: No swelling.   Skin:     General: " Skin is warm and dry.   Neurological:      Mental Status: He is alert and oriented to person, place, and time.   Psychiatric:         Mood and Affect: Mood normal.           Last Labs:  CBC -  Lab Results   Component Value Date    WBC 7.9 02/19/2024    HGB 12.2 (L) 02/19/2024    HCT 41.0 02/19/2024    MCV 88 02/19/2024     02/19/2024       CMP -  Lab Results   Component Value Date    CALCIUM 8.9 04/23/2024    PHOS 3.9 04/23/2024    PROT 6.3 (L) 02/19/2024    ALBUMIN 4.1 04/23/2024    AST 18 02/19/2024    ALT 16 02/19/2024    ALKPHOS 40 02/19/2024    BILITOT 0.6 02/19/2024       LIPID PANEL -   Lab Results   Component Value Date    CHOL 130 02/19/2024    TRIG 133 02/19/2024    HDL 59.3 02/19/2024    CHHDL 2.2 02/19/2024    LDLF 31 06/01/2023    VLDL 27 02/19/2024    NHDL 71 02/19/2024       RENAL FUNCTION PANEL -   Lab Results   Component Value Date    GLUCOSE 235 (H) 04/23/2024     04/23/2024    K 4.8 04/23/2024     04/23/2024    CO2 29 04/23/2024    ANIONGAP 12 04/23/2024    BUN 26 (H) 04/23/2024    CREATININE 1.36 (H) 04/23/2024    GFRMALE 51 (A) 06/01/2023    CALCIUM 8.9 04/23/2024    PHOS 3.9 04/23/2024    ALBUMIN 4.1 04/23/2024        Lab Results   Component Value Date    HGBA1C 7.6 (H) 02/19/2024       Last Cardiology Tests:    Echo:  Transthoracic Echo (TTE) Complete 12/01/2023--CONCLUSIONS:   1. Left ventricular systolic function is normal.   2. Spectral Doppler shows an abnormal pattern of left ventricular diastolic filling.   3. The left atrium is moderate to severely dilated.   4. The right atrium is moderately dilated.   5. Mild to moderate tricuspid regurgitation.   6. Mildly elevated RVSP.   7. Mild to moderate aortic valve stenosis.   8. There is moderate aortic valve cusp calcification.   9. Left Ventricular Global Longitudinal Strain - 15.6 %.        Lab review: I have personally reviewed the laboratory result(s).    Assessment/Plan   Problem List Items Addressed This Visit              ICD-10-CM       Cardiac and Vasculature    Benign hypertension - Primary I10    Persistent atrial fibrillation (Multi) I48.19    Essential hypertension I10   Persistent atrial fibrillation--ventricular rates are well-controlled.  Patient is chronically anticoagulated with warfarin followed in our Coumadin clinic.  Again with upcoming surgery he will need to hold his Coumadin but we do not have a date at this time.  Hypertension--blood pressure is controlled on current meds.  Patient concerned about his weight but it really has been stable.  Right inguinal hernia--needs laparoscopic hernia repair.  In discussing RCRI data based on the fact that this is intra-abdominal surgery and that he is on insulin his RCRI score is 2 indicating 6.6% risk of major adverse cardiac events as explained to the patient.  Also the patient is aware that with holding Coumadin in the setting of persistent atrial fibrillation there is slight risk of stroke and again the patient verbalizes understanding as well.  Overall patient at slight increased risk of major adverse cardiac events but certainly does not seem prohibitive at this time.  Will get information over to the surgeon.  Again overall the patient is doing extremely well.  He eats very well and exercises 5 days a week with no exertional complaints or concerns.  No further cardiac testing is necessary at this time as his LV systolic function is within normal limits.  Again we will send appropriate paperwork over to the surgeon with no further cardiac testing needed.      Landon Barba PA-C  6/10/2024  1:36 PM

## 2024-06-10 NOTE — PATIENT INSTRUCTIONS
Please continue your current medications.  We will take care of paperwork for hernia repair and send over to Dr. Aguilar.   If you have any change in your cardiorespiratory status please call the office right away.  We will arrange for your yearly visit with your cardiologist.

## 2024-06-10 NOTE — PATIENT INSTRUCTIONS
Your INR today is low at 1.9. We will increase your weekly regimen to 7.5mg on Monday and 5 mg all other days. We will follow up in 2 weeks.  If any questions arise do not hesitate to call us at [phone] M-F from 8:30am-4:30pm or at   [phone] after 5pm or on the weekends. Continue to monitor for any excess bleeding or bruising, especially for blood in your stool.

## 2024-06-12 ENCOUNTER — TELEPHONE (OUTPATIENT)
Dept: SURGERY | Facility: CLINIC | Age: 84
End: 2024-06-12
Payer: MEDICARE

## 2024-06-12 DIAGNOSIS — K40.90 INGUINAL HERNIA OF RIGHT SIDE WITHOUT OBSTRUCTION OR GANGRENE: Primary | ICD-10-CM

## 2024-06-12 NOTE — TELEPHONE ENCOUNTER
Surgery scheduled 7/1/24.    ----- Message from Angelina Aguilar MD sent at 6/11/2024  4:41 PM EDT -----  Regarding: RE: Schedule hernia surgery  Tried calling the patient again.  Had to leave a voice message.    ----- Message -----  From: Angelina Aguilar MD  Sent: 6/11/2024   9:20 AM EDT  To: Angelina Aguilar MD; Eli Kumari MA  Subject: RE: Schedule hernia surgery                      I tried calling the patient, but had to leave a voice message.  He has been cleared by cardiology (but no paperwork has been received from the cardiology office).  Left message for the patient to call the office to pick a surgery date for his lap right inguinal hernia repair surgery.  He will need to come off of the Coumadin 5 days prior to his surgery.  He was encouraged to call the office to pick a surgery date.    1.  Please, send cardiac clearance form to cardiology ([facility]).    ----- Message -----  From: Angelina Aguilar MD  Sent: 6/11/2024   8:00 AM EDT  To: Angelina Aguilar MD  Subject: Schedule hernia surgery                          Had cardiology appointment on 6/10/2024.  See if he is ready to schedule his laparoscopic right inguinal hernia repair surgery.  Will need to stop his Coumadin 5 days prior to surgery.  Will also need documentation of cardiac clearance for surgery.

## 2024-06-12 NOTE — TELEPHONE ENCOUNTER
----- Message from Angelina Aguilar MD sent at 6/12/2024  1:00 PM EDT -----  Regarding: RE: Schedule hernia surgery  1. OK to schedule surgery 7/1/24  2. Needs office appointment 7-10 days before surgery    ----- Message -----  From: Eli Kumari MA  Sent: 6/12/2024   9:48 AM EDT  To: Angelina Aguilar MD  Subject: FW: Schedule hernia surgery                      We have received the clearance form back from Landon Barba PA-C that the patient is cleared. I spoke with the patient and he would like to schedule surgery for 7/1/24.   ----- Message -----  From: Eli Kumari MA  Sent: 6/11/2024  11:19 AM EDT  To: Eli Kumari MA  Subject: RE: Schedule hernia surgery                      Clearance form has been sent to cardiology.  ----- Message -----  From: Angelina Aguilar MD  Sent: 6/11/2024   9:20 AM EDT  To: Angelina Aguilar MD; Eli Kumari MA  Subject: RE: Schedule hernia surgery                      I tried calling the patient, but had to leave a voice message.  He has been cleared by cardiology (but no paperwork has been received from the cardiology office).  Left message for the patient to call the office to pick a surgery date for his lap right inguinal hernia repair surgery.  He will need to come off of the Coumadin 5 days prior to his surgery.  He was encouraged to call the office to pick a surgery date.    1.  Please, send cardiac clearance form to cardiology ([facility]).    ----- Message -----  From: Angelina Aguilar MD  Sent: 6/11/2024   8:00 AM EDT  To: Angelina Aguilar MD  Subject: Schedule hernia surgery                          Had cardiology appointment on 6/10/2024.  See if he is ready to schedule his laparoscopic right inguinal hernia repair surgery.  Will need to stop his Coumadin 5 days prior to surgery.  Will also need documentation of cardiac clearance for surgery.

## 2024-06-18 ENCOUNTER — APPOINTMENT (OUTPATIENT)
Dept: SURGERY | Facility: CLINIC | Age: 84
End: 2024-06-18
Payer: MEDICARE

## 2024-06-18 ENCOUNTER — PRE-ADMISSION TESTING (OUTPATIENT)
Dept: PREADMISSION TESTING | Facility: HOSPITAL | Age: 84
End: 2024-06-18
Payer: MEDICARE

## 2024-06-18 VITALS
WEIGHT: 169.4 LBS | BODY MASS INDEX: 22.94 KG/M2 | DIASTOLIC BLOOD PRESSURE: 68 MMHG | RESPIRATION RATE: 20 BRPM | TEMPERATURE: 98.7 F | SYSTOLIC BLOOD PRESSURE: 160 MMHG | HEART RATE: 48 BPM | HEIGHT: 72 IN | OXYGEN SATURATION: 97 %

## 2024-06-18 VITALS
SYSTOLIC BLOOD PRESSURE: 148 MMHG | OXYGEN SATURATION: 96 % | BODY MASS INDEX: 23 KG/M2 | HEIGHT: 72 IN | DIASTOLIC BLOOD PRESSURE: 61 MMHG | HEART RATE: 53 BPM | WEIGHT: 169.8 LBS

## 2024-06-18 DIAGNOSIS — K40.90 INGUINAL HERNIA OF RIGHT SIDE WITHOUT OBSTRUCTION OR GANGRENE: ICD-10-CM

## 2024-06-18 DIAGNOSIS — Z79.01 CHRONIC ANTICOAGULATION: ICD-10-CM

## 2024-06-18 DIAGNOSIS — Z01.818 PRE-OP EVALUATION: Primary | ICD-10-CM

## 2024-06-18 DIAGNOSIS — K40.90 INGUINAL HERNIA OF RIGHT SIDE WITHOUT OBSTRUCTION OR GANGRENE: Primary | ICD-10-CM

## 2024-06-18 DIAGNOSIS — E11.42 TYPE 2 DIABETES MELLITUS WITH DIABETIC POLYNEUROPATHY, UNSPECIFIED WHETHER LONG TERM INSULIN USE (MULTI): ICD-10-CM

## 2024-06-18 DIAGNOSIS — I10 BENIGN HYPERTENSION: ICD-10-CM

## 2024-06-18 DIAGNOSIS — N18.30 STAGE 3 CHRONIC KIDNEY DISEASE, UNSPECIFIED WHETHER STAGE 3A OR 3B CKD (MULTI): ICD-10-CM

## 2024-06-18 DIAGNOSIS — I45.10 RBBB: ICD-10-CM

## 2024-06-18 DIAGNOSIS — I48.19 PERSISTENT ATRIAL FIBRILLATION (MULTI): ICD-10-CM

## 2024-06-18 DIAGNOSIS — K42.9 UMBILICAL HERNIA WITHOUT OBSTRUCTION AND WITHOUT GANGRENE: ICD-10-CM

## 2024-06-18 LAB
ANION GAP SERPL CALC-SCNC: 11 MMOL/L (ref 10–20)
BUN SERPL-MCNC: 28 MG/DL (ref 6–23)
CALCIUM SERPL-MCNC: 8.9 MG/DL (ref 8.6–10.3)
CHLORIDE SERPL-SCNC: 104 MMOL/L (ref 98–107)
CO2 SERPL-SCNC: 26 MMOL/L (ref 21–32)
CREAT SERPL-MCNC: 1.36 MG/DL (ref 0.5–1.3)
EGFRCR SERPLBLD CKD-EPI 2021: 51 ML/MIN/1.73M*2
ERYTHROCYTE [DISTWIDTH] IN BLOOD BY AUTOMATED COUNT: 15.5 % (ref 11.5–14.5)
GLUCOSE SERPL-MCNC: 187 MG/DL (ref 74–99)
HCT VFR BLD AUTO: 38 % (ref 41–52)
HGB BLD-MCNC: 11.9 G/DL (ref 13.5–17.5)
MCH RBC QN AUTO: 26.9 PG (ref 26–34)
MCHC RBC AUTO-ENTMCNC: 31.3 G/DL (ref 32–36)
MCV RBC AUTO: 86 FL (ref 80–100)
NRBC BLD-RTO: 0 /100 WBCS (ref 0–0)
PLATELET # BLD AUTO: 230 X10*3/UL (ref 150–450)
POTASSIUM SERPL-SCNC: 4.3 MMOL/L (ref 3.5–5.3)
RBC # BLD AUTO: 4.42 X10*6/UL (ref 4.5–5.9)
SODIUM SERPL-SCNC: 137 MMOL/L (ref 136–145)
WBC # BLD AUTO: 6.2 X10*3/UL (ref 4.4–11.3)

## 2024-06-18 PROCEDURE — 85027 COMPLETE CBC AUTOMATED: CPT | Performed by: CLINICAL NURSE SPECIALIST

## 2024-06-18 PROCEDURE — 1157F ADVNC CARE PLAN IN RCRD: CPT | Performed by: SURGERY

## 2024-06-18 PROCEDURE — 93010 ELECTROCARDIOGRAM REPORT: CPT | Performed by: INTERNAL MEDICINE

## 2024-06-18 PROCEDURE — 1159F MED LIST DOCD IN RCRD: CPT | Performed by: SURGERY

## 2024-06-18 PROCEDURE — 1036F TOBACCO NON-USER: CPT | Performed by: SURGERY

## 2024-06-18 PROCEDURE — 3077F SYST BP >= 140 MM HG: CPT | Performed by: SURGERY

## 2024-06-18 PROCEDURE — 93005 ELECTROCARDIOGRAM TRACING: CPT

## 2024-06-18 PROCEDURE — 99213 OFFICE O/P EST LOW 20 MIN: CPT | Performed by: SURGERY

## 2024-06-18 PROCEDURE — 80048 BASIC METABOLIC PNL TOTAL CA: CPT | Performed by: CLINICAL NURSE SPECIALIST

## 2024-06-18 PROCEDURE — 1160F RVW MEDS BY RX/DR IN RCRD: CPT | Performed by: SURGERY

## 2024-06-18 PROCEDURE — 3078F DIAST BP <80 MM HG: CPT | Performed by: SURGERY

## 2024-06-18 PROCEDURE — 36415 COLL VENOUS BLD VENIPUNCTURE: CPT

## 2024-06-18 RX ORDER — CEFAZOLIN SODIUM 2 G/100ML
2 INJECTION, SOLUTION INTRAVENOUS ONCE
OUTPATIENT
Start: 2024-06-18 | End: 2024-06-18

## 2024-06-18 ASSESSMENT — ENCOUNTER SYMPTOMS
COUGH: 0
NAUSEA: 0
SPEECH DIFFICULTY: 0
HEMATURIA: 0
FEVER: 0
SHORTNESS OF BREATH: 0
BRUISES/BLEEDS EASILY: 1
CONSTIPATION: 0
DYSURIA: 0
CHILLS: 0
FLANK PAIN: 0
EYE PAIN: 0
CONFUSION: 0
WEAKNESS: 0
MYALGIAS: 1
FREQUENCY: 1
POLYPHAGIA: 0
AGITATION: 0
DIARRHEA: 0
ABDOMINAL PAIN: 0
WOUND: 0
HEADACHES: 0
EYE REDNESS: 0
FATIGUE: 0
ARTHRALGIAS: 0
VOMITING: 0

## 2024-06-18 ASSESSMENT — DUKE ACTIVITY SCORE INDEX (DASI)
CAN YOU WALK A BLOCK OR TWO ON LEVEL GROUND: YES
CAN YOU TAKE CARE OF YOURSELF (EAT, DRESS, BATHE, OR USE TOILET): YES
CAN YOU PARTICIPATE IN MODERATE RECREATIONAL ACTIVITIES LIKE GOLF, BOWLING, DANCING, DOUBLES TENNIS OR THROWING A BASEBALL OR FOOTBALL: YES
CAN YOU RUN A SHORT DISTANCE: NO
CAN YOU PARTICIPATE IN STRENOUS SPORTS LIKE SWIMMING, SINGLES TENNIS, FOOTBALL, BASKETBALL, OR SKIING: NO
CAN YOU DO MODERATE WORK AROUND THE HOUSE LIKE VACUUMING, SWEEPING FLOORS OR CARRYING GROCERIES: YES
CAN YOU WALK INDOORS, SUCH AS AROUND YOUR HOUSE: YES
CAN YOU DO YARD WORK LIKE RAKING LEAVES, WEEDING OR PUSHING A MOWER: YES
CAN YOU CLIMB A FLIGHT OF STAIRS OR WALK UP A HILL: YES
CAN YOU DO HEAVY WORK AROUND THE HOUSE LIKE SCRUBBING FLOORS OR LIFTING AND MOVING HEAVY FURNITURE: NO
CAN YOU DO LIGHT WORK AROUND THE HOUSE LIKE DUSTING OR WASHING DISHES: YES

## 2024-06-18 ASSESSMENT — LIFESTYLE VARIABLES: SMOKING_STATUS: NONSMOKER

## 2024-06-18 NOTE — CPM/PAT H&P
CPM/PAT Evaluation       Name: Jonah Lauren (Jonah Lauren)  /Age: 1940/83 y.o.     In-Person       Chief Complaint: Scheduled for right laparoscopic inguinal hernia repair under general anesthesia per Dr. Avendano on 24.       Past Medical History:   Diagnosis Date    Deficiency of other specified B group vitamins     Vitamin B12 deficiency    Hyperlipidemia     Hypertension     Hypomagnesemia 2018    Hypomagnesemia    Irregular heart beat     a fib    Lightheadedness 2023    Personal history of other diseases of male genital organs 04/10/2019    History of benign prostatic hyperplasia    Personal history of other diseases of the digestive system     History of irritable bowel syndrome    Personal history of other endocrine, nutritional and metabolic disease     History of diabetes mellitus    Personal history of urinary (tract) infections 2021    History of urinary tract infection    Scrotal rash 2023    Weight loss, unintentional 2023       Past Surgical History:   Procedure Laterality Date    PROSTATE SURGERY  10/18/2016    Prostate Surgery    TONSILLECTOMY  10/18/2016    Tonsillectomy       Patient Sexual activity questions deferred to the physician.    Family History   Problem Relation Name Age of Onset    Bone cancer Mother          Spine    Heart attack Father      Cancer Sister      Other (Mesthelioma) Brother      Diabetes Brother         Allergies   Allergen Reactions    Atorvastatin Diarrhea and Other       Prior to Admission medications    Medication Sig Start Date End Date Taking? Authorizing Provider   acetaminophen (Tylenol) 500 mg tablet Take 1 tablet (500 mg) by mouth 4 times a day. 16  Yes Historical Provider, MD   amLODIPine (Norvasc) 2.5 mg tablet Take 1 tablet (2.5 mg) by mouth once daily. 18  Yes Historical Provider, MD   cholecalciferol (Vitamin D-3) 25 MCG (1000 UT) tablet Take 3 tablets (75 mcg) by mouth once daily. 18   Yes Historical Provider, MD   cyanocobalamin (Vitamin B-12) 500 mcg tablet Take 1 tablet (500 mcg) by mouth once daily. 11/8/17  Yes Historical Provider, MD   insulin lispro protamin-lispro (HumaLOG Mix 75-25) 100 unit/mL (75-25) injection Inject 17 Units under the skin 2 times a day with meals. 4/16/24 4/16/25 Yes Anna Fernandez MD   Jardiance 10 mg Take 1 tablet (10 mg) by mouth once daily. 5/29/23  Yes Historical Provider, MD   loperamide (Imodium) 2 mg capsule Take 1 capsule (2 mg) by mouth 4 times a day as needed. 4/23/24  Yes Historical Provider, MD   losartan (Cozaar) 100 mg tablet Take 1 tablet (100 mg) by mouth once daily. 2/26/24  Yes Anna Fernandez MD   metFORMIN (Glucophage) 500 mg tablet Take 1 tablet (500 mg) by mouth 4 times a day. 2/26/24 2/25/25 Yes Anna Fernandez MD   multivitamin tablet Take 1 tablet by mouth once daily.   Yes Historical Provider, MD   rosuvastatin (Crestor) 5 mg tablet TAKE 1 TABLET BY MOUTH DAILY 2/21/24  Yes Landon Barba PA-C   aMILoride (Midamor) 5 mg tablet Take 1 tablet (5 mg) by mouth once daily. 11/29/18   Historical Provider, MD   blood sugar diagnostic (Accu-Chek Barbara Plus test strp) strip Apply 1 each topically 4 times a day. Patient is to check blood sugar 4 times a day 5/22/24 5/22/25  Anna Fernandez MD   Dexcom G6 Sensor device For monitoring in insulin dependent diabetic  Patient not taking: Reported on 6/18/2024 2/29/24   Anna Fernandez MD   Dexcom G6 Transmitter device Use as instructed  Patient not taking: Reported on 6/18/2024 2/29/24   Anna Fernandez MD   lancets misc To check sugars up to four times daily 2/29/24   Anna Fernandez MD   magnesium oxide (Mag-Ox) 400 mg (241.3 mg magnesium) tablet Take 6 tablets (2,400 mg) by mouth once daily. 2/26/24   Anna Fernandez MD   nystatin (Mycostatin) cream twice a day. APPLY A THIN LAYER TO AFFECTED AREA(S) AND RUB IN WELL TWICE DAILY. 3/16/23   Historical Provider, MD   pen  "needle, diabetic 32 gauge x 5/32\" needle 1 each 2 times a day.    Historical Provider, MD   triamcinolone (Kenalog) 0.1 % cream Apply 1 Application topically 2 times a day. 3/16/23   Historical Provider, MD   warfarin (Coumadin) 5 mg tablet TAKE 1-2 TABLETS BY MOUTH ONCE  DAILY AS DIRECTED BY COUMADIN  CLINIC 11/30/23   Landon Barba PA-C          Visit Vitals  /68   Pulse (!) 48   Temp 37.1 °C (98.7 °F) (Temporal)   Resp 20       DASI Risk Score    No data to display       Caprini DVT Assessment      Flowsheet Row Most Recent Value   DVT Score 8   Current Status Major surgery planned, lasting 2-3 hours   History Prior major surgery   Age Over 75 years   BMI 30 or less          Modified Frailty Index    No data to display       CHADS2 Stroke Risk  Current as of 7 minutes ago        5.9% 3 to 100%: High Risk   2 to < 3%: Medium Risk   0 to < 2%: Low Risk     Last Change: N/A          This score determines the patient's risk of having a stroke if the patient has atrial fibrillation.          Points Metrics   0 Has Congestive Heart Failure:  No     Patients with congestive heart failure get 1 point.    Current as of 7 minutes ago   1 Has Hypertension:  Yes     Patients with hypertension get 1 point.    Current as of 7 minutes ago   1 Age:  83     Patients who are 75 years of age or older get 1 point.    Current as of 7 minutes ago   1 Has Diabetes:  Yes     Patients with diabetes get 1 point.    Current as of 7 minutes ago   0 Had Stroke:  No  Had TIA:  No  Had Thromboembolism:  No     Patients who have had a stroke, TIA, or thromboembolism get 2 points.    Current as of 7 minutes ago             Revised Cardiac Risk Index      Flowsheet Row Most Recent Value   Revised Cardiac Risk Calculator 2          Apfel Simplified Score      Flowsheet Row Most Recent Value   Apfel Simplified Score Calculator 2          Risk Analysis Index Results This Encounter    No data found in the last 1 encounters.       Stop Bang " Score      Flowsheet Row Most Recent Value   Do you snore loudly? 0   Do you often feel tired or fatigued after your sleep? 0   Has anyone ever observed you stop breathing in your sleep? 0   Do you have or are you being treated for high blood pressure? 1   Recent BMI (Calculated) 23   Is BMI greater than 35 kg/m2? 0=No   Age older than 50 years old? 1=Yes   Is your neck circumference greater than 17 inches (Male) or 16 inches (Female)? 0   Gender - Male 1=Yes   STOP-BANG Total Score 3            Assessment and Plan:     Gastrointestinal:   Musculoskeletal:  Scheduled for right laparoscopic inguinal hernia repair under general anesthesia per Dr. Avendano on 7/1/24.   CBC, BMP drawn today. Results pending.   EKG today is bradycardic afib with a RBBB. Rate: 45 bpm.   Patient states he is stopping his coumadin, metformin, and jardiance 5 days prior to procedure in spite of surgery recommendations. He states he's also not taking his insulin/lispro the night before surgery. He will plan to take his losartan the morning of surgery with a small sip of water.   Provided with Hibiclens at his [facility] visit today.   Cardiac appointment was 6/10/24 with H&P completed.   Cardiac clearance on chart is dated 4/2024.   Patient saw Dr. Avendano today 6/18/24.   All surgery instructions reviewed with patient by RN. Verbalized understanding.

## 2024-06-18 NOTE — PREPROCEDURE INSTRUCTIONS
"   Medication List            Accurate as of June 18, 2024  3:36 PM. Always use your most recent med list.                Accu-Chek Barbara Plus test strp strip  Generic drug: blood sugar diagnostic  Apply 1 each topically 4 times a day. Patient is to check blood sugar 4 times a day     acetaminophen 500 mg tablet  Commonly known as: Tylenol     aMILoride 5 mg tablet  Commonly known as: Midamor     amLODIPine 2.5 mg tablet  Commonly known as: Norvasc     cholecalciferol 25 MCG (1000 UT) tablet  Commonly known as: Vitamin D-3     cyanocobalamin 500 mcg tablet  Commonly known as: Vitamin B-12     Dexcom G6 Sensor device  Generic drug: blood-glucose sensor  For monitoring in insulin dependent diabetic     Dexcom G6 Transmitter device  Generic drug: blood-glucose transmitter device  Use as instructed     insulin lispro protamin-lispro 100 unit/mL (75-25) injection  Commonly known as: HumaLOG Mix 75-25  Inject 17 Units under the skin 2 times a day with meals.     Jardiance 10 mg  Generic drug: empagliflozin     lancets misc  To check sugars up to four times daily     loperamide 2 mg capsule  Commonly known as: Imodium     losartan 100 mg tablet  Commonly known as: Cozaar  Take 1 tablet (100 mg) by mouth once daily.     magnesium oxide 400 mg (241.3 mg magnesium) tablet  Commonly known as: Mag-Ox     metFORMIN 500 mg tablet  Commonly known as: Glucophage  Take 1 tablet (500 mg) by mouth 4 times a day.     multivitamin tablet     nystatin cream  Commonly known as: Mycostatin     pen needle, diabetic 32 gauge x 5/32\" needle     rosuvastatin 5 mg tablet  Commonly known as: Crestor  TAKE 1 TABLET BY MOUTH DAILY     triamcinolone 0.1 % cream  Commonly known as: Kenalog     warfarin 5 mg tablet  Commonly known as: Coumadin  Take as directed by the anticoagulation clinic. If you are unsure how to take this medication, talk to your nurse or doctor.  Original instructions: TAKE 1-2 TABLETS BY MOUTH ONCE  DAILY AS DIRECTED BY " COUMADIN  CLINIC                The medication bridging plan has been discussed with the surgeon and the patient has been informed of the plan.              NPO Instructions:    Do not eat any food after midnight the night before your surgery/procedure.    Additional Instructions:     Wear  comfortable loose fitting clothing  Do not use moisturizers, creams, lotions or perfume  All jewelry and valuables should be left at home    Must have a   Take losartan in the morning of surgery with a sip of water  Last dose of jardiance, and coumadin will be 6/25   Stop multi vitamin one week prior to surgery  Pt is stopping metformin on 6/25  May take 1/2 dose of insulin in evening of 6/30

## 2024-06-18 NOTE — PATIENT INSTRUCTIONS
1.  Activity as tolerated.  Avoid any activity that causes pain at your hernia site.  2.  If you do have pain at your hernia site, lie down and apply ice to your right groin area.  You may also do a gentle massage to the right groin to help reduce the lump.  However, if the pain does not improve within 1 hour, go immediately to the emergency room.  3.  Surgery for repair of your right inguinal hernia is scheduled on 7/1/2024.  Please, read the patient preoperative instruction sheet BEFORE your surgery.  4.  You will need to stop taking your Coumadin and Jardiance 5 days prior to surgery.  Do NOT take your Coumadin and Jardiance starting on Wednesday, 6/26/2024.  Do not resume these medications until after your surgery and cleared by your surgeon.

## 2024-06-18 NOTE — PROGRESS NOTES
GENERAL SURGERY OFFICE NOTE    Patient: Jonah Lauren    Age: 83 y.o.   Gender: male    MRN: [id number]    PCP: Anna Fernandez MD        SUBJECTIVE     Chief Complaint  Follow-up (Patient is here for a pre-op right inguinal hernia repair. Patient states he is not having any new or worsening symptoms. )       KEISHA Mckenzie returns to the office for further evaluation of his right inguinal hernia and umbilical hernia.  Since his last office appointment, he was seen by cardiology and has been given clearance to come off of his Coumadin for his surgical intervention.  He continues to have some achy/soreness sensation in the right groin area when he walks for long.'s of time.  However, he notes that after lying down in bed all night, the hernia itself reduces by morning.  He does not have any obstructive symptoms.  He is ready to proceed with surgical intervention.    ROS  Review of Systems   Constitutional:  Negative for chills, fatigue and fever.   HENT:  Negative for congestion, ear pain and hearing loss.    Eyes:  Negative for pain and redness.   Respiratory:  Negative for cough and shortness of breath.    Cardiovascular:  Negative for chest pain and leg swelling.   Gastrointestinal:  Negative for abdominal pain, constipation, diarrhea, nausea and vomiting.   Endocrine: Negative for polyphagia.   Genitourinary:  Positive for frequency. Negative for dysuria, flank pain and hematuria.   Musculoskeletal:  Positive for myalgias. Negative for arthralgias.   Skin:  Negative for rash and wound.   Allergic/Immunologic: Negative for immunocompromised state.   Neurological:  Negative for speech difficulty, weakness and headaches.   Hematological:  Bruises/bleeds easily.   Psychiatric/Behavioral:  Negative for agitation and confusion.           HISTORY     Past Medical History:   Diagnosis Date    Deficiency of other specified B group vitamins     Vitamin B12 deficiency    Hypomagnesemia 11/29/2018    Hypomagnesemia     Lightheadedness 06/01/2023    Personal history of other diseases of male genital organs 04/10/2019    History of benign prostatic hyperplasia    Personal history of other diseases of the digestive system     History of irritable bowel syndrome    Personal history of other endocrine, nutritional and metabolic disease     History of diabetes mellitus    Personal history of urinary (tract) infections 12/23/2021    History of urinary tract infection    Scrotal rash 06/01/2023    Weight loss, unintentional 06/01/2023        Past Surgical History:   Procedure Laterality Date    PROSTATE SURGERY  10/18/2016    Prostate Surgery    TONSILLECTOMY  10/18/2016    Tonsillectomy        Family History   Problem Relation Name Age of Onset    Bone cancer Mother          Spine    Heart attack Father      Cancer Sister      Other (Mesthelioma) Brother      Diabetes Brother          Allergies   Allergen Reactions    Atorvastatin Diarrhea and Other        Social History     Tobacco Use   Smoking Status Never   Smokeless Tobacco Never        Social History     Substance and Sexual Activity   Alcohol Use Never        HOME MEDICATIONS  Current Outpatient Medications   Medication Instructions    acetaminophen (TYLENOL) 500 mg, oral, 4 times daily    aMILoride (Midamor) 5 mg tablet 1 tablet, oral, Daily    amLODIPine (Norvasc) 2.5 mg tablet 1 tablet, oral, Daily    blood sugar diagnostic (Accu-Chek Barbara Plus test strp) strip 1 each, Topical, 4 times daily, Patient is to check blood sugar 4 times a day    cholecalciferol (Vitamin D-3) 25 MCG (1000 UT) tablet 3 tablets, oral, Daily    cyanocobalamin (Vitamin B-12) 500 mcg tablet 1 tablet, oral, Daily    Dexcom G6 Sensor device For monitoring in insulin dependent diabetic    Dexcom G6 Transmitter device Use as instructed    insulin lispro protamin-lispro (HumaLOG Mix 75-25) 100 unit/mL (75-25) injection 17 Units, subcutaneous, 2 times daily (morning and late afternoon)    Jardiance 10 mg,  "oral, Daily    lancets misc To check sugars up to four times daily    loperamide (IMODIUM) 2 mg, oral, 4 times daily PRN    losartan (COZAAR) 100 mg, oral, Daily    magnesium oxide (Mag-Ox) 400 mg (241.3 mg magnesium) tablet 6 tablets, oral, Daily    metFORMIN (GLUCOPHAGE) 500 mg, oral, 4 times daily    multivitamin tablet 1 tablet, oral, Daily    nystatin (Mycostatin) cream 2 times daily, APPLY A THIN LAYER TO AFFECTED AREA(S) AND RUB IN WELL TWICE DAILY.    pen needle, diabetic 32 gauge x 5/32\" needle 1 each, miscellaneous, 2 times daily    rosuvastatin (CRESTOR) 5 mg, oral, Daily    triamcinolone (Kenalog) 0.1 % cream 1 Application, Topical, 2 times daily    warfarin (Coumadin) 5 mg tablet TAKE 1-2 TABLETS BY MOUTH ONCE  DAILY AS DIRECTED BY COUMADIN  CLINIC          OBJECTIVE   Last Recorded Vitals.  Blood pressure 148/61, pulse 53, height 1.829 m (6'), weight 77 kg (169 lb 12.8 oz), SpO2 96%.     PHYSICAL EXAM  Physical Exam   General: Well-developed, well-nourished and in no acute distress.  Appears younger than stated age.  Head: Normocephalic. Atraumatic.  Neck/thyroid: Neck is supple.   Eyes: Pupils equal round and reactive to light. Conjunctiva normal.  ENMT: No masses or deformity of external nose. External ears without masses.  Respiratory/Chest:  Normal respiratory effort.  Cardiovascular: Regular rate and rhythm.   Abdomen: Soft, nontender, nondistended.  Moderate diastasis recti extending from the upper epigastric region to several centimeters below the umbilicus.  There is a 1 cm umbilical hernia fascial defect with reducible contents.  Rectal: Deferred  : Normal external genitalia.  In the standing position, there is a small right inguinal bulge which is reducible and nontender.  No obvious abnormality on the left.  Musculoskeletal: Joints and limbs are grossly normal. Normal gait. Normal range of motion of major joints.  Neuro: Oriented to person, place and time. No obvious neurological deficit. " Motor strength grossly normal.  Psych: Normal mood and affect.    RESULTS   Labs  No results found for this or any previous visit (from the past 24 hour(s)).    Radiology Resutls  No results found.       ASSESSMENT / PLAN   Diagnoses and all orders for this visit:  Inguinal hernia of right side without obstruction or gangrene  Umbilical hernia without obstruction and without gangrene  Chronic anticoagulation  Other orders  -     Full code; Standing  -     Vital Signs; Standing  -     Apply KAROLINE hose knee length; Standing  -     Apply sequential compression device; Standing  -     Place in outpatient/hospital ambulatory surgery; Standing  -     Protime-INR; Standing  -     ceFAZolin in dextrose (iso-os) (Ancef) IVPB 2 g        Plan  1.  The benefits and risks of a laparoscopic right inguinal hernia repair with mesh surgery have been discussed with the patient.  The nature of the procedure was reviewed with the patient which included the risk and benefits of having surgery.  Alternative treatment options were reviewed. The risk included, but not limited to, infection, bleeding, injury surrounding organs, possible need for open procedure, possible need for another procedure, hernia formation at incision sites, recurrent hernias in the inguinal area, allergic reaction to medication and death.  If a hernia is identified on the left at the time of surgery, laparoscopic repair of a left inguinal hernia will also be performed.  Reviewed that the umbilical hernia would automatically be repaired at the time of the laparoscopic procedure since this will be the location of one of the port sites.  However, he will still have persistent diastasis recti.  He has had a previous TURP for BPH.  2.  Education material regarding inguinal hernias has been provided to the patient previously.  3.  Signs and symptoms of incarceration have been reviewed. If any of these symptoms develop, the patient was instructed to contact the office  immediately.  4.  Before surgical intervention, he will need to come off of his Coumadin for 5 days preoperatively.  Reports that he does not require bridge therapy.  Will need INR on day of surgery.  With his chronic anticoagulation, he is a slightly higher risk of bleeding perioperatively.  5.  Was seen by cardiology and cleared for surgery.  6.  He is also on Jardiance.  Will hold this 3 to 5 days prior to surgery per protocol.  7.  Will benefit from 24-hour observation postoperatively given his advanced age and other medical comorbidities.      Angelina Aguilar MD, FACS  [facility] General Surgery  [address] [place] [address];   [address]  [place]  [address] [phone]

## 2024-06-19 LAB
ATRIAL RATE: 0 BPM
PR INTERVAL: 61 MS
Q ONSET: 252 MS
QRS COUNT: 9 BEATS
QRS DURATION: 151 MS
QT INTERVAL: 463 MS
QTC CALCULATION(BAZETT): 401 MS
QTC FREDERICIA: 420 MS
R AXIS: 81 DEGREES
T AXIS: -22 DEGREES
T OFFSET: 484 MS
VENTRICULAR RATE: 45 BPM

## 2024-06-24 ENCOUNTER — ANTICOAGULATION - WARFARIN VISIT (OUTPATIENT)
Dept: PHARMACY | Facility: HOSPITAL | Age: 84
End: 2024-06-24
Payer: MEDICARE

## 2024-06-24 DIAGNOSIS — I48.0 PAROXYSMAL ATRIAL FIBRILLATION (MULTI): ICD-10-CM

## 2024-06-24 DIAGNOSIS — I48.19 PERSISTENT ATRIAL FIBRILLATION (MULTI): Primary | ICD-10-CM

## 2024-06-24 LAB
POC INR: 2.3 (ref 2–3)
POC PROTHROMBIN TIME: 27.2

## 2024-06-24 PROCEDURE — 85610 PROTHROMBIN TIME: CPT | Mod: QW | Performed by: INTERNAL MEDICINE

## 2024-06-24 PROCEDURE — 99211 OFF/OP EST MAY X REQ PHY/QHP: CPT

## 2024-06-24 NOTE — PATIENT INSTRUCTIONS
Your INR today is in range at 2.3. Please start holding on Wednesday until day of surgery, July 1 then continue your weekly regimen once instructed by surgeon to restart to 7.5mg on Monday and 5 mg all other days. We will follow up in 2 weeks.  If any questions arise do not hesitate to call us at [phone] M-F from 8:30am-4:30pm or at   [phone] after 5pm or on the weekends. Continue to monitor for any excess bleeding or bruising, especially for blood in your stool.

## 2024-06-24 NOTE — PROGRESS NOTES
Mr. Lauren is a referral from Dr. Morgan for warfarin management of Afib (goal 2-3). He denies any changes in health. He denies any missed or extra doses. No signs or symptoms of bleeding reported. No changes in his diet or his other medications. For his calf pain he said he still takes the Ana Paula's brand Leg Cramp PM, which is a quinine derivative product and it was discussed that this may increase the risk of bleeding with warfarin. We discussed if he starts taking frequently to let the clinic know. He is also to have hernia surgery on July 1. He will need to hold the warfarin for that procedure starting June 26. Today his INR is in range at 2.3. With the upcoming procedure, we discussed holding June 26 to July 1 where the surgeon will resume. After surgery, he will continue the weekly regimen once the surgeon tells him to restart to 7.5mg on Monday and 5mg all other days. We will follow up about one week after surgery.

## 2024-06-27 ENCOUNTER — APPOINTMENT (OUTPATIENT)
Dept: PHARMACY | Facility: HOSPITAL | Age: 84
End: 2024-06-27
Payer: MEDICARE

## 2024-06-27 DIAGNOSIS — E11.9 TYPE 2 DIABETES MELLITUS WITHOUT COMPLICATION, WITH LONG-TERM CURRENT USE OF INSULIN (MULTI): ICD-10-CM

## 2024-06-27 DIAGNOSIS — Z79.4 TYPE 2 DIABETES MELLITUS WITHOUT COMPLICATION, WITH LONG-TERM CURRENT USE OF INSULIN (MULTI): ICD-10-CM

## 2024-06-27 RX ORDER — BLOOD SUGAR DIAGNOSTIC
1 STRIP MISCELLANEOUS 4 TIMES DAILY
Qty: 400 STRIP | Refills: 3 | Status: SHIPPED | OUTPATIENT
Start: 2024-06-27 | End: 2025-06-27

## 2024-06-28 ENCOUNTER — PREP FOR PROCEDURE (OUTPATIENT)
Dept: SURGERY | Facility: CLINIC | Age: 84
End: 2024-06-28
Payer: MEDICARE

## 2024-06-28 NOTE — H&P
GENERAL SURGERY PREOP H&P    Patient: Jonah Lauren  Room: Room/bed info not found    Age: 83 y.o.   Gender: male  Attending: No att. providers found    MRN: [id number]  Admission Date: (Not on file)    PCP: Anna Fernandez MD         SUBJECTIVE       HPI  Jonah Lauren is a 83 y.o. male who presents for elective surgery for a laparoscopic right inguinal hernia repair with mesh surgery. Since his last office appointment, he was seen by cardiology and has been given clearance to come off of his Coumadin for his surgical intervention. He continues to have some achy/soreness sensation in the right groin area when he walks for long.'s of time. However, he notes that after lying down in bed all night, the hernia itself reduces by morning. He does not have any obstructive symptoms. He is ready to proceed with surgical intervention.     ROS  Review of Systems   Constitutional:  Negative for chills, fatigue and fever.   HENT:  Negative for congestion, ear pain and hearing loss.    Eyes:  Negative for pain and redness.   Respiratory:  Negative for cough and shortness of breath.    Cardiovascular:  Negative for chest pain and leg swelling.   Gastrointestinal:  Negative for abdominal pain, constipation, diarrhea, nausea and vomiting.   Endocrine: Negative for polyphagia.   Genitourinary:  Positive for frequency. Negative for dysuria, flank pain and hematuria.   Musculoskeletal:  Positive for myalgias. Negative for arthralgias.   Skin:  Negative for rash and wound.   Allergic/Immunologic: Negative for immunocompromised state.   Neurological:  Negative for speech difficulty, weakness and headaches.   Hematological:  Bruises/bleeds easily.   Psychiatric/Behavioral:  Negative for agitation and confusion.      HISTORY     Past Medical History:   Diagnosis Date    Deficiency of other specified B group vitamins     Vitamin B12 deficiency    Hyperlipidemia     Hypertension     Hypomagnesemia 11/29/2018     Hypomagnesemia    Irregular heart beat     a fib    Lightheadedness 06/01/2023    Personal history of other diseases of male genital organs 04/10/2019    History of benign prostatic hyperplasia    Personal history of other diseases of the digestive system     History of irritable bowel syndrome    Personal history of other endocrine, nutritional and metabolic disease     History of diabetes mellitus    Personal history of urinary (tract) infections 12/23/2021    History of urinary tract infection    Scrotal rash 06/01/2023    Weight loss, unintentional 06/01/2023        Past Surgical History:   Procedure Laterality Date    PROSTATE SURGERY  10/18/2016    Prostate Surgery    TONSILLECTOMY  10/18/2016    Tonsillectomy        Family History   Problem Relation Name Age of Onset    Bone cancer Mother          Spine    Heart attack Father      Cancer Sister      Other (Mesthelioma) Brother      Diabetes Brother          Allergies   Allergen Reactions    Atorvastatin Diarrhea and Other        Social History     Tobacco Use   Smoking Status Never   Smokeless Tobacco Never        Social History     Substance and Sexual Activity   Alcohol Use Never        HOME MEDICATIONS  Current Outpatient Medications   Medication Instructions    acetaminophen (TYLENOL) 500 mg, oral, 4 times daily    aMILoride (Midamor) 5 mg tablet 1 tablet, oral, Daily    amLODIPine (Norvasc) 2.5 mg tablet 1 tablet, oral, Daily    blood sugar diagnostic (Accu-Chek Barbara Plus test strp) strip 1 each, Topical, 4 times daily, Patient is to check blood sugar 4 times a day    cholecalciferol (Vitamin D-3) 25 MCG (1000 UT) tablet 3 tablets, oral, Daily    cyanocobalamin (Vitamin B-12) 500 mcg tablet 1 tablet, oral, Daily    Dexcom G6 Sensor device For monitoring in insulin dependent diabetic    Dexcom G6 Transmitter device Use as instructed    insulin lispro protamin-lispro (HumaLOG Mix 75-25) 100 unit/mL (75-25) injection 17 Units, subcutaneous, 2 times daily  "(morning and late afternoon)    Jardiance 10 mg, oral, Daily    lancets misc To check sugars up to four times daily    loperamide (IMODIUM) 2 mg, oral, 4 times daily PRN    losartan (COZAAR) 100 mg, oral, Daily    magnesium oxide (Mag-Ox) 400 mg (241.3 mg magnesium) tablet 6 tablets, oral, Daily    metFORMIN (GLUCOPHAGE) 500 mg, oral, 4 times daily    multivitamin tablet 1 tablet, oral, Daily    nystatin (Mycostatin) cream 2 times daily, APPLY A THIN LAYER TO AFFECTED AREA(S) AND RUB IN WELL TWICE DAILY.    pen needle, diabetic 32 gauge x 5/32\" needle 1 each, miscellaneous, 2 times daily    rosuvastatin (CRESTOR) 5 mg, oral, Daily    triamcinolone (Kenalog) 0.1 % cream 1 Application, Topical, 2 times daily    warfarin (Coumadin) 5 mg tablet TAKE 1-2 TABLETS BY MOUTH ONCE  DAILY AS DIRECTED BY COUMADIN  CLINIC          OBJECTIVE   Last Recorded Vitals.  There were no vitals taken for this visit.     PHYSICAL EXAM  Physical Exam   General: Well-developed, well-nourished and in no acute distress.  Appears younger than stated age.  Head: Normocephalic. Atraumatic.  Neck/thyroid: Neck is supple.   Eyes: Pupils equal round and reactive to light. Conjunctiva normal.  ENMT: No masses or deformity of external nose. External ears without masses.  Respiratory/Chest:  Normal respiratory effort.  Cardiovascular: Regular rate and rhythm.   Abdomen: Soft, nontender, nondistended.  Moderate diastasis recti extending from the upper epigastric region to several centimeters below the umbilicus.  There is a 1 cm umbilical hernia fascial defect with reducible contents.  Rectal: Deferred  : Normal external genitalia.  In the standing position, there is a small right inguinal bulge which is reducible and nontender.  No obvious abnormality on the left.  Musculoskeletal: Joints and limbs are grossly normal. Normal gait. Normal range of motion of major joints.  Neuro: Oriented to person, place and time. No obvious neurological deficit. " Motor strength grossly normal.  Psych: Normal mood and affect.    RESULTS   Labs  No results found for this or any previous visit (from the past 24 hour(s)).    Radiology Resutls  No results found.       ASSESSMENT / PLAN   Inguinal hernia of right side without obstruction or gangrene  Umbilical hernia without obstruction and without gangrene  Chronic anticoagulation  Other orders  -     Full code; Standing  -     Vital Signs; Standing  -     Apply KAROLINE hose knee length; Standing  -     Apply sequential compression device; Standing  -     Place in outpatient/hospital ambulatory surgery; Standing  -     Protime-INR; Standing  -     ceFAZolin in dextrose (iso-os) (Ancef) IVPB 2 g           Plan  1.  The benefits and risks of a laparoscopic right inguinal hernia repair with mesh surgery have been discussed with the patient.  The nature of the procedure was reviewed with the patient which included the risk and benefits of having surgery.  Alternative treatment options were reviewed. The risk included, but not limited to, infection, bleeding, injury surrounding organs, possible need for open procedure, possible need for another procedure, hernia formation at incision sites, recurrent hernias in the inguinal area, allergic reaction to medication and death.  If a hernia is identified on the left at the time of surgery, laparoscopic repair of a left inguinal hernia will also be performed.  Reviewed that the umbilical hernia would automatically be repaired at the time of the laparoscopic procedure since this will be the location of one of the port sites.  However, he will still have persistent diastasis recti.  He has had a previous TURP for BPH.  2.  Education material regarding inguinal hernias has been provided to the patient previously.  3.  Signs and symptoms of incarceration have been reviewed. If any of these symptoms develop, the patient was instructed to contact the office immediately.  4.  Before surgical  intervention, he will need to come off of his Coumadin for 5 days preoperatively.  Reports that he does not require bridge therapy.  Will need INR on day of surgery.  With his chronic anticoagulation, he is a slightly higher risk of bleeding perioperatively.  5.  Was seen by cardiology and cleared for surgery.  6.  He is also on Jardiance.  Will hold this 3 to 5 days prior to surgery per protocol.  7.  Will benefit from 24-hour observation postoperatively given his advanced age and other medical comorbidities.        Angelina Aguilar MD, FACS  [facility] General Surgery  [address] [place] [address];   [employer] [address]  [place]  [address] [phone]

## 2024-06-28 NOTE — H&P (VIEW-ONLY)
GENERAL SURGERY PREOP H&P    Patient: Jonah Lauren  Room: Room/bed info not found    Age: 83 y.o.   Gender: male  Attending: No att. providers found    MRN: [id number]  Admission Date: (Not on file)    PCP: Anna Fernandez MD         SUBJECTIVE       HPI  Jonah Lauren is a 83 y.o. male who presents for elective surgery for a laparoscopic right inguinal hernia repair with mesh surgery. Since his last office appointment, he was seen by cardiology and has been given clearance to come off of his Coumadin for his surgical intervention. He continues to have some achy/soreness sensation in the right groin area when he walks for long.'s of time. However, he notes that after lying down in bed all night, the hernia itself reduces by morning. He does not have any obstructive symptoms. He is ready to proceed with surgical intervention.     ROS  Review of Systems   Constitutional:  Negative for chills, fatigue and fever.   HENT:  Negative for congestion, ear pain and hearing loss.    Eyes:  Negative for pain and redness.   Respiratory:  Negative for cough and shortness of breath.    Cardiovascular:  Negative for chest pain and leg swelling.   Gastrointestinal:  Negative for abdominal pain, constipation, diarrhea, nausea and vomiting.   Endocrine: Negative for polyphagia.   Genitourinary:  Positive for frequency. Negative for dysuria, flank pain and hematuria.   Musculoskeletal:  Positive for myalgias. Negative for arthralgias.   Skin:  Negative for rash and wound.   Allergic/Immunologic: Negative for immunocompromised state.   Neurological:  Negative for speech difficulty, weakness and headaches.   Hematological:  Bruises/bleeds easily.   Psychiatric/Behavioral:  Negative for agitation and confusion.      HISTORY     Past Medical History:   Diagnosis Date    Deficiency of other specified B group vitamins     Vitamin B12 deficiency    Hyperlipidemia     Hypertension     Hypomagnesemia 11/29/2018     Hypomagnesemia    Irregular heart beat     a fib    Lightheadedness 06/01/2023    Personal history of other diseases of male genital organs 04/10/2019    History of benign prostatic hyperplasia    Personal history of other diseases of the digestive system     History of irritable bowel syndrome    Personal history of other endocrine, nutritional and metabolic disease     History of diabetes mellitus    Personal history of urinary (tract) infections 12/23/2021    History of urinary tract infection    Scrotal rash 06/01/2023    Weight loss, unintentional 06/01/2023        Past Surgical History:   Procedure Laterality Date    PROSTATE SURGERY  10/18/2016    Prostate Surgery    TONSILLECTOMY  10/18/2016    Tonsillectomy        Family History   Problem Relation Name Age of Onset    Bone cancer Mother          Spine    Heart attack Father      Cancer Sister      Other (Mesthelioma) Brother      Diabetes Brother          Allergies   Allergen Reactions    Atorvastatin Diarrhea and Other        Social History     Tobacco Use   Smoking Status Never   Smokeless Tobacco Never        Social History     Substance and Sexual Activity   Alcohol Use Never        HOME MEDICATIONS  Current Outpatient Medications   Medication Instructions    acetaminophen (TYLENOL) 500 mg, oral, 4 times daily    aMILoride (Midamor) 5 mg tablet 1 tablet, oral, Daily    amLODIPine (Norvasc) 2.5 mg tablet 1 tablet, oral, Daily    blood sugar diagnostic (Accu-Chek Barbara Plus test strp) strip 1 each, Topical, 4 times daily, Patient is to check blood sugar 4 times a day    cholecalciferol (Vitamin D-3) 25 MCG (1000 UT) tablet 3 tablets, oral, Daily    cyanocobalamin (Vitamin B-12) 500 mcg tablet 1 tablet, oral, Daily    Dexcom G6 Sensor device For monitoring in insulin dependent diabetic    Dexcom G6 Transmitter device Use as instructed    insulin lispro protamin-lispro (HumaLOG Mix 75-25) 100 unit/mL (75-25) injection 17 Units, subcutaneous, 2 times daily  "(morning and late afternoon)    Jardiance 10 mg, oral, Daily    lancets misc To check sugars up to four times daily    loperamide (IMODIUM) 2 mg, oral, 4 times daily PRN    losartan (COZAAR) 100 mg, oral, Daily    magnesium oxide (Mag-Ox) 400 mg (241.3 mg magnesium) tablet 6 tablets, oral, Daily    metFORMIN (GLUCOPHAGE) 500 mg, oral, 4 times daily    multivitamin tablet 1 tablet, oral, Daily    nystatin (Mycostatin) cream 2 times daily, APPLY A THIN LAYER TO AFFECTED AREA(S) AND RUB IN WELL TWICE DAILY.    pen needle, diabetic 32 gauge x 5/32\" needle 1 each, miscellaneous, 2 times daily    rosuvastatin (CRESTOR) 5 mg, oral, Daily    triamcinolone (Kenalog) 0.1 % cream 1 Application, Topical, 2 times daily    warfarin (Coumadin) 5 mg tablet TAKE 1-2 TABLETS BY MOUTH ONCE  DAILY AS DIRECTED BY COUMADIN  CLINIC          OBJECTIVE   Last Recorded Vitals.  There were no vitals taken for this visit.     PHYSICAL EXAM  Physical Exam   General: Well-developed, well-nourished and in no acute distress.  Appears younger than stated age.  Head: Normocephalic. Atraumatic.  Neck/thyroid: Neck is supple.   Eyes: Pupils equal round and reactive to light. Conjunctiva normal.  ENMT: No masses or deformity of external nose. External ears without masses.  Respiratory/Chest:  Normal respiratory effort.  Cardiovascular: Regular rate and rhythm.   Abdomen: Soft, nontender, nondistended.  Moderate diastasis recti extending from the upper epigastric region to several centimeters below the umbilicus.  There is a 1 cm umbilical hernia fascial defect with reducible contents.  Rectal: Deferred  : Normal external genitalia.  In the standing position, there is a small right inguinal bulge which is reducible and nontender.  No obvious abnormality on the left.  Musculoskeletal: Joints and limbs are grossly normal. Normal gait. Normal range of motion of major joints.  Neuro: Oriented to person, place and time. No obvious neurological deficit. " Motor strength grossly normal.  Psych: Normal mood and affect.    RESULTS   Labs  No results found for this or any previous visit (from the past 24 hour(s)).    Radiology Resutls  No results found.       ASSESSMENT / PLAN   Inguinal hernia of right side without obstruction or gangrene  Umbilical hernia without obstruction and without gangrene  Chronic anticoagulation  Other orders  -     Full code; Standing  -     Vital Signs; Standing  -     Apply KAROLINE hose knee length; Standing  -     Apply sequential compression device; Standing  -     Place in outpatient/hospital ambulatory surgery; Standing  -     Protime-INR; Standing  -     ceFAZolin in dextrose (iso-os) (Ancef) IVPB 2 g           Plan  1.  The benefits and risks of a laparoscopic right inguinal hernia repair with mesh surgery have been discussed with the patient.  The nature of the procedure was reviewed with the patient which included the risk and benefits of having surgery.  Alternative treatment options were reviewed. The risk included, but not limited to, infection, bleeding, injury surrounding organs, possible need for open procedure, possible need for another procedure, hernia formation at incision sites, recurrent hernias in the inguinal area, allergic reaction to medication and death.  If a hernia is identified on the left at the time of surgery, laparoscopic repair of a left inguinal hernia will also be performed.  Reviewed that the umbilical hernia would automatically be repaired at the time of the laparoscopic procedure since this will be the location of one of the port sites.  However, he will still have persistent diastasis recti.  He has had a previous TURP for BPH.  2.  Education material regarding inguinal hernias has been provided to the patient previously.  3.  Signs and symptoms of incarceration have been reviewed. If any of these symptoms develop, the patient was instructed to contact the office immediately.  4.  Before surgical  intervention, he will need to come off of his Coumadin for 5 days preoperatively.  Reports that he does not require bridge therapy.  Will need INR on day of surgery.  With his chronic anticoagulation, he is a slightly higher risk of bleeding perioperatively.  5.  Was seen by cardiology and cleared for surgery.  6.  He is also on Jardiance.  Will hold this 3 to 5 days prior to surgery per protocol.  7.  Will benefit from 24-hour observation postoperatively given his advanced age and other medical comorbidities.        Angelina Aguilar MD, FACS  [facility] General Surgery  [address] [place] [address];   [employer] [address]; [address]  [place]  [address] [phone]

## 2024-07-01 ENCOUNTER — HOSPITAL ENCOUNTER (OUTPATIENT)
Facility: HOSPITAL | Age: 84
Discharge: HOME | End: 2024-07-02
Attending: SURGERY | Admitting: SURGERY
Payer: MEDICARE

## 2024-07-01 ENCOUNTER — ANESTHESIA (OUTPATIENT)
Dept: OPERATING ROOM | Facility: HOSPITAL | Age: 84
End: 2024-07-01
Payer: MEDICARE

## 2024-07-01 ENCOUNTER — ANESTHESIA EVENT (OUTPATIENT)
Dept: OPERATING ROOM | Facility: HOSPITAL | Age: 84
End: 2024-07-01
Payer: MEDICARE

## 2024-07-01 ENCOUNTER — APPOINTMENT (OUTPATIENT)
Dept: CARDIOLOGY | Facility: HOSPITAL | Age: 84
End: 2024-07-01
Payer: MEDICARE

## 2024-07-01 ENCOUNTER — PHARMACY VISIT (OUTPATIENT)
Dept: PHARMACY | Facility: CLINIC | Age: 84
End: 2024-07-01
Payer: COMMERCIAL

## 2024-07-01 DIAGNOSIS — K40.90 RIGHT INGUINAL HERNIA: Primary | ICD-10-CM

## 2024-07-01 DIAGNOSIS — K40.90 INGUINAL HERNIA OF RIGHT SIDE WITHOUT OBSTRUCTION OR GANGRENE: ICD-10-CM

## 2024-07-01 PROBLEM — H91.90 HEARING LOSS: Status: ACTIVE | Noted: 2024-07-01

## 2024-07-01 LAB
ATRIAL RATE: 0 BPM
GLUCOSE BLD MANUAL STRIP-MCNC: 277 MG/DL (ref 74–99)
GLUCOSE BLD MANUAL STRIP-MCNC: 85 MG/DL (ref 74–99)
INR PPP: 1.1 (ref 0.9–1.1)
PR INTERVAL: 264 MS
PROTHROMBIN TIME: 12.3 SECONDS (ref 9.8–12.8)
Q ONSET: 249 MS
QRS COUNT: 9 BEATS
QRS DURATION: 149 MS
QT INTERVAL: 487 MS
QTC CALCULATION(BAZETT): 458 MS
QTC FREDERICIA: 467 MS
R AXIS: 71 DEGREES
T AXIS: -13 DEGREES
T OFFSET: 493 MS
VENTRICULAR RATE: 53 BPM

## 2024-07-01 PROCEDURE — 2500000004 HC RX 250 GENERAL PHARMACY W/ HCPCS (ALT 636 FOR OP/ED): Performed by: ANESTHESIOLOGY

## 2024-07-01 PROCEDURE — 49650 LAP ING HERNIA REPAIR INIT: CPT | Performed by: SURGERY

## 2024-07-01 PROCEDURE — C1781 MESH (IMPLANTABLE): HCPCS | Performed by: SURGERY

## 2024-07-01 PROCEDURE — 2500000004 HC RX 250 GENERAL PHARMACY W/ HCPCS (ALT 636 FOR OP/ED): Mod: JZ | Performed by: SURGERY

## 2024-07-01 PROCEDURE — 2500000005 HC RX 250 GENERAL PHARMACY W/O HCPCS: Performed by: SURGERY

## 2024-07-01 PROCEDURE — 2500000005 HC RX 250 GENERAL PHARMACY W/O HCPCS

## 2024-07-01 PROCEDURE — 93010 ELECTROCARDIOGRAM REPORT: CPT | Performed by: INTERNAL MEDICINE

## 2024-07-01 PROCEDURE — 3600000004 HC OR TIME - INITIAL BASE CHARGE - PROCEDURE LEVEL FOUR: Performed by: SURGERY

## 2024-07-01 PROCEDURE — 7100000011 HC EXTENDED STAY RECOVERY HOURLY - NURSING UNIT

## 2024-07-01 PROCEDURE — 2780000003 HC OR 278 NO HCPCS: Performed by: SURGERY

## 2024-07-01 PROCEDURE — 7100000002 HC RECOVERY ROOM TIME - EACH INCREMENTAL 1 MINUTE: Performed by: SURGERY

## 2024-07-01 PROCEDURE — 2500000002 HC RX 250 W HCPCS SELF ADMINISTERED DRUGS (ALT 637 FOR MEDICARE OP, ALT 636 FOR OP/ED): Performed by: STUDENT IN AN ORGANIZED HEALTH CARE EDUCATION/TRAINING PROGRAM

## 2024-07-01 PROCEDURE — 82947 ASSAY GLUCOSE BLOOD QUANT: CPT

## 2024-07-01 PROCEDURE — RXMED WILLOW AMBULATORY MEDICATION CHARGE

## 2024-07-01 PROCEDURE — 85610 PROTHROMBIN TIME: CPT | Performed by: SURGERY

## 2024-07-01 PROCEDURE — 36415 COLL VENOUS BLD VENIPUNCTURE: CPT | Performed by: SURGERY

## 2024-07-01 PROCEDURE — 3700000002 HC GENERAL ANESTHESIA TIME - EACH INCREMENTAL 1 MINUTE: Performed by: SURGERY

## 2024-07-01 PROCEDURE — 2720000007 HC OR 272 NO HCPCS: Performed by: SURGERY

## 2024-07-01 PROCEDURE — 93005 ELECTROCARDIOGRAM TRACING: CPT | Mod: 59

## 2024-07-01 PROCEDURE — 3700000001 HC GENERAL ANESTHESIA TIME - INITIAL BASE CHARGE: Performed by: SURGERY

## 2024-07-01 PROCEDURE — 2500000001 HC RX 250 WO HCPCS SELF ADMINISTERED DRUGS (ALT 637 FOR MEDICARE OP): Performed by: STUDENT IN AN ORGANIZED HEALTH CARE EDUCATION/TRAINING PROGRAM

## 2024-07-01 PROCEDURE — 2500000004 HC RX 250 GENERAL PHARMACY W/ HCPCS (ALT 636 FOR OP/ED)

## 2024-07-01 PROCEDURE — 7100000001 HC RECOVERY ROOM TIME - INITIAL BASE CHARGE: Performed by: SURGERY

## 2024-07-01 PROCEDURE — 2500000001 HC RX 250 WO HCPCS SELF ADMINISTERED DRUGS (ALT 637 FOR MEDICARE OP): Performed by: ANESTHESIOLOGY

## 2024-07-01 PROCEDURE — G0378 HOSPITAL OBSERVATION PER HR: HCPCS

## 2024-07-01 PROCEDURE — 3600000009 HC OR TIME - EACH INCREMENTAL 1 MINUTE - PROCEDURE LEVEL FOUR: Performed by: SURGERY

## 2024-07-01 DEVICE — LAPAROSCOPIC SELF-FIXATING MESH POLYESTER WITH POLYLACTIC ACID GRIPS AND COLLAGEN FILM
Type: IMPLANTABLE DEVICE | Site: INGUINAL | Status: FUNCTIONAL
Brand: PROGRIP

## 2024-07-01 RX ORDER — WARFARIN SODIUM 5 MG/1
5 TABLET ORAL DAILY
Status: DISCONTINUED | OUTPATIENT
Start: 2024-07-02 | End: 2024-07-02 | Stop reason: HOSPADM

## 2024-07-01 RX ORDER — ONDANSETRON HYDROCHLORIDE 2 MG/ML
4 INJECTION, SOLUTION INTRAVENOUS ONCE
Status: COMPLETED | OUTPATIENT
Start: 2024-07-01 | End: 2024-07-01

## 2024-07-01 RX ORDER — HYDROCODONE BITARTRATE AND ACETAMINOPHEN 7.5; 325 MG/1; MG/1
1 TABLET ORAL EVERY 6 HOURS PRN
Qty: 10 TABLET | Refills: 0 | Status: SHIPPED | OUTPATIENT
Start: 2024-07-01

## 2024-07-01 RX ORDER — ACETAMINOPHEN 160 MG/5ML
650 SOLUTION ORAL EVERY 4 HOURS PRN
Status: DISCONTINUED | OUTPATIENT
Start: 2024-07-01 | End: 2024-07-02 | Stop reason: HOSPADM

## 2024-07-01 RX ORDER — BISACODYL 5 MG
10 TABLET, DELAYED RELEASE (ENTERIC COATED) ORAL DAILY PRN
Status: DISCONTINUED | OUTPATIENT
Start: 2024-07-01 | End: 2024-07-02 | Stop reason: HOSPADM

## 2024-07-01 RX ORDER — POLYETHYLENE GLYCOL 3350 17 G/17G
17 POWDER, FOR SOLUTION ORAL DAILY
Status: DISCONTINUED | OUTPATIENT
Start: 2024-07-01 | End: 2024-07-02 | Stop reason: HOSPADM

## 2024-07-01 RX ORDER — METFORMIN HYDROCHLORIDE 500 MG/1
500 TABLET ORAL 4 TIMES DAILY
Status: DISCONTINUED | OUTPATIENT
Start: 2024-07-02 | End: 2024-07-02 | Stop reason: HOSPADM

## 2024-07-01 RX ORDER — LIDOCAINE HCL/PF 100 MG/5ML
SYRINGE (ML) INTRAVENOUS AS NEEDED
Status: DISCONTINUED | OUTPATIENT
Start: 2024-07-01 | End: 2024-07-01

## 2024-07-01 RX ORDER — ACETAMINOPHEN 325 MG/1
650 TABLET ORAL EVERY 4 HOURS PRN
Status: DISCONTINUED | OUTPATIENT
Start: 2024-07-01 | End: 2024-07-01

## 2024-07-01 RX ORDER — FENTANYL CITRATE 50 UG/ML
INJECTION, SOLUTION INTRAMUSCULAR; INTRAVENOUS AS NEEDED
Status: DISCONTINUED | OUTPATIENT
Start: 2024-07-01 | End: 2024-07-01

## 2024-07-01 RX ORDER — INSULIN LISPRO 100 [IU]/ML
17 INJECTION, SUSPENSION SUBCUTANEOUS
Status: DISCONTINUED | OUTPATIENT
Start: 2024-07-01 | End: 2024-07-02 | Stop reason: HOSPADM

## 2024-07-01 RX ORDER — ROCURONIUM BROMIDE 50 MG/5 ML
SYRINGE (ML) INTRAVENOUS AS NEEDED
Status: DISCONTINUED | OUTPATIENT
Start: 2024-07-01 | End: 2024-07-01

## 2024-07-01 RX ORDER — ONDANSETRON HYDROCHLORIDE 2 MG/ML
4 INJECTION, SOLUTION INTRAVENOUS EVERY 8 HOURS PRN
Status: DISCONTINUED | OUTPATIENT
Start: 2024-07-01 | End: 2024-07-02 | Stop reason: HOSPADM

## 2024-07-01 RX ORDER — BUPIVACAINE HCL/EPINEPHRINE 0.5-1:200K
VIAL (ML) INJECTION AS NEEDED
Status: DISCONTINUED | OUTPATIENT
Start: 2024-07-01 | End: 2024-07-01 | Stop reason: HOSPADM

## 2024-07-01 RX ORDER — AMILORIDE HYDROCHLORIDE 5 MG/1
5 TABLET ORAL DAILY
Status: DISCONTINUED | OUTPATIENT
Start: 2024-07-01 | End: 2024-07-02 | Stop reason: HOSPADM

## 2024-07-01 RX ORDER — DOCUSATE SODIUM 100 MG/1
100 CAPSULE, LIQUID FILLED ORAL 2 TIMES DAILY
Qty: 28 CAPSULE | Refills: 0 | Status: SHIPPED | OUTPATIENT
Start: 2024-07-01

## 2024-07-01 RX ORDER — UBIDECARENONE 75 MG
500 CAPSULE ORAL DAILY
Status: DISCONTINUED | OUTPATIENT
Start: 2024-07-01 | End: 2024-07-02 | Stop reason: HOSPADM

## 2024-07-01 RX ORDER — OXYCODONE HYDROCHLORIDE 5 MG/1
5 TABLET ORAL EVERY 6 HOURS PRN
Status: DISCONTINUED | OUTPATIENT
Start: 2024-07-01 | End: 2024-07-01

## 2024-07-01 RX ORDER — ETOMIDATE 2 MG/ML
INJECTION INTRAVENOUS AS NEEDED
Status: DISCONTINUED | OUTPATIENT
Start: 2024-07-01 | End: 2024-07-01

## 2024-07-01 RX ORDER — ONDANSETRON HYDROCHLORIDE 2 MG/ML
4 INJECTION, SOLUTION INTRAVENOUS ONCE AS NEEDED
Status: DISCONTINUED | OUTPATIENT
Start: 2024-07-01 | End: 2024-07-01 | Stop reason: HOSPADM

## 2024-07-01 RX ORDER — LOSARTAN POTASSIUM 50 MG/1
100 TABLET ORAL DAILY
Status: DISCONTINUED | OUTPATIENT
Start: 2024-07-01 | End: 2024-07-02 | Stop reason: HOSPADM

## 2024-07-01 RX ORDER — ROSUVASTATIN CALCIUM 10 MG/1
5 TABLET, COATED ORAL DAILY
Status: DISCONTINUED | OUTPATIENT
Start: 2024-07-01 | End: 2024-07-02 | Stop reason: HOSPADM

## 2024-07-01 RX ORDER — NITROGLYCERIN 20 MG/100ML
INJECTION INTRAVENOUS AS NEEDED
Status: DISCONTINUED | OUTPATIENT
Start: 2024-07-01 | End: 2024-07-01

## 2024-07-01 RX ORDER — SODIUM CHLORIDE 9 MG/ML
50 INJECTION, SOLUTION INTRAVENOUS CONTINUOUS
Status: DISCONTINUED | OUTPATIENT
Start: 2024-07-01 | End: 2024-07-02 | Stop reason: HOSPADM

## 2024-07-01 RX ORDER — ONDANSETRON 4 MG/1
4 TABLET, ORALLY DISINTEGRATING ORAL EVERY 8 HOURS PRN
Status: DISCONTINUED | OUTPATIENT
Start: 2024-07-01 | End: 2024-07-02 | Stop reason: HOSPADM

## 2024-07-01 RX ORDER — OXYCODONE HYDROCHLORIDE 10 MG/1
10 TABLET ORAL EVERY 4 HOURS PRN
Status: DISCONTINUED | OUTPATIENT
Start: 2024-07-01 | End: 2024-07-01

## 2024-07-01 RX ORDER — ACETAMINOPHEN 650 MG/1
650 SUPPOSITORY RECTAL EVERY 4 HOURS PRN
Status: DISCONTINUED | OUTPATIENT
Start: 2024-07-01 | End: 2024-07-02 | Stop reason: HOSPADM

## 2024-07-01 RX ORDER — ACETAMINOPHEN 325 MG/1
975 TABLET ORAL ONCE
Status: COMPLETED | OUTPATIENT
Start: 2024-07-01 | End: 2024-07-01

## 2024-07-01 RX ORDER — SODIUM CITRATE AND CITRIC ACID MONOHYDRATE 334; 500 MG/5ML; MG/5ML
30 SOLUTION ORAL ONCE
Status: COMPLETED | OUTPATIENT
Start: 2024-07-01 | End: 2024-07-01

## 2024-07-01 RX ORDER — PROPOFOL 10 MG/ML
INJECTION, EMULSION INTRAVENOUS CONTINUOUS PRN
Status: DISCONTINUED | OUTPATIENT
Start: 2024-07-01 | End: 2024-07-01

## 2024-07-01 RX ORDER — MORPHINE SULFATE 2 MG/ML
1 INJECTION, SOLUTION INTRAMUSCULAR; INTRAVENOUS EVERY 5 MIN PRN
Status: DISCONTINUED | OUTPATIENT
Start: 2024-07-01 | End: 2024-07-01 | Stop reason: HOSPADM

## 2024-07-01 RX ORDER — HYDROCODONE BITARTRATE AND ACETAMINOPHEN 7.5; 325 MG/1; MG/1
1 TABLET ORAL EVERY 6 HOURS PRN
Status: DISCONTINUED | OUTPATIENT
Start: 2024-07-01 | End: 2024-07-02 | Stop reason: HOSPADM

## 2024-07-01 RX ORDER — METOCLOPRAMIDE HYDROCHLORIDE 5 MG/ML
5 INJECTION INTRAMUSCULAR; INTRAVENOUS ONCE
Status: COMPLETED | OUTPATIENT
Start: 2024-07-01 | End: 2024-07-01

## 2024-07-01 RX ORDER — MORPHINE SULFATE 2 MG/ML
2 INJECTION, SOLUTION INTRAMUSCULAR; INTRAVENOUS EVERY 5 MIN PRN
Status: DISCONTINUED | OUTPATIENT
Start: 2024-07-01 | End: 2024-07-01 | Stop reason: HOSPADM

## 2024-07-01 RX ORDER — CEFAZOLIN SODIUM 2 G/100ML
2 INJECTION, SOLUTION INTRAVENOUS ONCE
Status: COMPLETED | OUTPATIENT
Start: 2024-07-01 | End: 2024-07-01

## 2024-07-01 RX ORDER — NALOXONE HYDROCHLORIDE 0.4 MG/ML
0.2 INJECTION, SOLUTION INTRAMUSCULAR; INTRAVENOUS; SUBCUTANEOUS EVERY 5 MIN PRN
Status: DISCONTINUED | OUTPATIENT
Start: 2024-07-01 | End: 2024-07-01

## 2024-07-01 RX ORDER — ACETAMINOPHEN 325 MG/1
650 TABLET ORAL EVERY 4 HOURS PRN
Status: DISCONTINUED | OUTPATIENT
Start: 2024-07-01 | End: 2024-07-02 | Stop reason: HOSPADM

## 2024-07-01 RX ORDER — AMLODIPINE BESYLATE 2.5 MG/1
2.5 TABLET ORAL DAILY
Status: DISCONTINUED | OUTPATIENT
Start: 2024-07-01 | End: 2024-07-02 | Stop reason: HOSPADM

## 2024-07-01 RX ORDER — GLYCOPYRROLATE 0.2 MG/ML
INJECTION INTRAMUSCULAR; INTRAVENOUS AS NEEDED
Status: DISCONTINUED | OUTPATIENT
Start: 2024-07-01 | End: 2024-07-01

## 2024-07-01 RX ORDER — FAMOTIDINE 10 MG/ML
20 INJECTION INTRAVENOUS ONCE
Status: COMPLETED | OUTPATIENT
Start: 2024-07-01 | End: 2024-07-01

## 2024-07-01 SDOH — SOCIAL STABILITY: SOCIAL INSECURITY: ABUSE: ADULT

## 2024-07-01 SDOH — SOCIAL STABILITY: SOCIAL INSECURITY: DO YOU FEEL UNSAFE GOING BACK TO THE PLACE WHERE YOU ARE LIVING?: NO

## 2024-07-01 SDOH — SOCIAL STABILITY: SOCIAL INSECURITY: WERE YOU ABLE TO COMPLETE ALL THE BEHAVIORAL HEALTH SCREENINGS?: YES

## 2024-07-01 SDOH — SOCIAL STABILITY: SOCIAL INSECURITY: DOES ANYONE TRY TO KEEP YOU FROM HAVING/CONTACTING OTHER FRIENDS OR DOING THINGS OUTSIDE YOUR HOME?: NO

## 2024-07-01 SDOH — SOCIAL STABILITY: SOCIAL INSECURITY: HAVE YOU HAD THOUGHTS OF HARMING ANYONE ELSE?: NO

## 2024-07-01 SDOH — SOCIAL STABILITY: SOCIAL INSECURITY: HAS ANYONE EVER THREATENED TO HURT YOUR FAMILY OR YOUR PETS?: NO

## 2024-07-01 SDOH — SOCIAL STABILITY: SOCIAL INSECURITY: ARE YOU OR HAVE YOU BEEN THREATENED OR ABUSED PHYSICALLY, EMOTIONALLY, OR SEXUALLY BY ANYONE?: NO

## 2024-07-01 SDOH — SOCIAL STABILITY: SOCIAL INSECURITY: HAVE YOU HAD ANY THOUGHTS OF HARMING ANYONE ELSE?: NO

## 2024-07-01 SDOH — SOCIAL STABILITY: SOCIAL INSECURITY: DO YOU FEEL ANYONE HAS EXPLOITED OR TAKEN ADVANTAGE OF YOU FINANCIALLY OR OF YOUR PERSONAL PROPERTY?: NO

## 2024-07-01 SDOH — SOCIAL STABILITY: SOCIAL INSECURITY: ARE THERE ANY APPARENT SIGNS OF INJURIES/BEHAVIORS THAT COULD BE RELATED TO ABUSE/NEGLECT?: NO

## 2024-07-01 SDOH — HEALTH STABILITY: MENTAL HEALTH: CURRENT SMOKER: 0

## 2024-07-01 ASSESSMENT — ACTIVITIES OF DAILY LIVING (ADL)
DRESSING YOURSELF: INDEPENDENT
HEARING - LEFT EAR: FUNCTIONAL
GROOMING: INDEPENDENT
LACK_OF_TRANSPORTATION: NO
BATHING: INDEPENDENT
FEEDING YOURSELF: INDEPENDENT
TOILETING: INDEPENDENT
HEARING - RIGHT EAR: FUNCTIONAL
ASSISTIVE_DEVICE: EYEGLASSES
PATIENT'S MEMORY ADEQUATE TO SAFELY COMPLETE DAILY ACTIVITIES?: YES
ADEQUATE_TO_COMPLETE_ADL: YES
WALKS IN HOME: INDEPENDENT
JUDGMENT_ADEQUATE_SAFELY_COMPLETE_DAILY_ACTIVITIES: YES

## 2024-07-01 ASSESSMENT — COLUMBIA-SUICIDE SEVERITY RATING SCALE - C-SSRS
1. IN THE PAST MONTH, HAVE YOU WISHED YOU WERE DEAD OR WISHED YOU COULD GO TO SLEEP AND NOT WAKE UP?: NO
6. HAVE YOU EVER DONE ANYTHING, STARTED TO DO ANYTHING, OR PREPARED TO DO ANYTHING TO END YOUR LIFE?: NO
2. HAVE YOU ACTUALLY HAD ANY THOUGHTS OF KILLING YOURSELF?: NO

## 2024-07-01 ASSESSMENT — PAIN SCALES - GENERAL
PAINLEVEL_OUTOF10: 3
PAINLEVEL_OUTOF10: 0 - NO PAIN
PAIN_LEVEL: 0
PAINLEVEL_OUTOF10: 0 - NO PAIN
PAINLEVEL_OUTOF10: 1

## 2024-07-01 ASSESSMENT — LIFESTYLE VARIABLES
AUDIT-C TOTAL SCORE: 0
HOW OFTEN DO YOU HAVE A DRINK CONTAINING ALCOHOL: NEVER
SKIP TO QUESTIONS 9-10: 1
HOW MANY STANDARD DRINKS CONTAINING ALCOHOL DO YOU HAVE ON A TYPICAL DAY: PATIENT DOES NOT DRINK
HOW OFTEN DO YOU HAVE 6 OR MORE DRINKS ON ONE OCCASION: NEVER
AUDIT-C TOTAL SCORE: 0

## 2024-07-01 ASSESSMENT — PATIENT HEALTH QUESTIONNAIRE - PHQ9
SUM OF ALL RESPONSES TO PHQ9 QUESTIONS 1 & 2: 0
2. FEELING DOWN, DEPRESSED OR HOPELESS: NOT AT ALL
1. LITTLE INTEREST OR PLEASURE IN DOING THINGS: NOT AT ALL

## 2024-07-01 ASSESSMENT — COGNITIVE AND FUNCTIONAL STATUS - GENERAL
MOVING TO AND FROM BED TO CHAIR: A LITTLE
STANDING UP FROM CHAIR USING ARMS: A LITTLE
DAILY ACTIVITIY SCORE: 23
WALKING IN HOSPITAL ROOM: A LITTLE
CLIMB 3 TO 5 STEPS WITH RAILING: A LITTLE
TURNING FROM BACK TO SIDE WHILE IN FLAT BAD: A LITTLE
PATIENT BASELINE BEDBOUND: NO
DRESSING REGULAR LOWER BODY CLOTHING: A LITTLE
MOVING FROM LYING ON BACK TO SITTING ON SIDE OF FLAT BED WITH BEDRAILS: A LITTLE
MOBILITY SCORE: 18

## 2024-07-01 ASSESSMENT — PAIN DESCRIPTION - DESCRIPTORS: DESCRIPTORS: TENDER

## 2024-07-01 ASSESSMENT — PAIN - FUNCTIONAL ASSESSMENT
PAIN_FUNCTIONAL_ASSESSMENT: 0-10
PAIN_FUNCTIONAL_ASSESSMENT: 0-10

## 2024-07-01 NOTE — ANESTHESIA POSTPROCEDURE EVALUATION
Patient: Jonah Lauren    Procedure Summary       Date: 07/01/24 Room / Location: [facility] OR 01 / Virtual [facility] OR    Anesthesia Start: 0807 Anesthesia Stop: 1009    Procedure: Laparoscopic right inguinal hernia repair with mesh; possible left (Right: Groin) Diagnosis:       Inguinal hernia of right side without obstruction or gangrene      (Inguinal hernia of right side without obstruction or gangrene [K40.90])    Surgeons: Angelina Aguilar MD Responsible Provider: DAVID Hutson    Anesthesia Type: general ASA Status: 3            Anesthesia Type: general    Vitals Value Taken Time   /78 07/01/24 1050   Temp 36.6 °C (97.8 °F) 07/01/24 1003   Pulse 54 07/01/24 1050   Resp 16 07/01/24 1050   SpO2 97 % 07/01/24 1050       Anesthesia Post Evaluation    Patient location during evaluation: PACU  Patient participation: complete - patient participated  Level of consciousness: awake  Pain score: 0  Pain management: adequate  Airway patency: patent  Cardiovascular status: acceptable  Respiratory status: acceptable  Hydration status: acceptable  Postoperative Nausea and Vomiting: none    No notable events documented.

## 2024-07-01 NOTE — OP NOTE
Laparoscopic right inguinal hernia repair with mesh; possible left (R) Operative Note     Date: 2024  OR Location: [facility] OR    Name: Jonah Lauren, : 1940, Age: 83 y.o., MRN: [id number], Sex: male    Diagnosis  Pre-op Diagnosis     * Inguinal hernia of right side without obstruction or gangrene [K40.90] Post-op Diagnosis     * Inguinal hernia of right side without obstruction or gangrene [K40.90]     Procedures  Laparoscopic right inguinal hernia repair with mesh; possible left  96315 - [place] LAPAROSCOPY SURG RPR INITIAL INGUINAL HERNIA      Surgeons      * Angelina Aguilar - Primary    Resident/Fellow/Other Assistant:  Christopher Velásquez DO    Procedure Summary  Anesthesia: General  ASA: III  Anesthesia Staff: CRNA: JANINE Hutson-CRNA  Estimated Blood Loss: 5mL  Intra-op Medications:   Administrations occurring from 0800 to 1000 on 24:   Medication Name Total Dose   BUPivacaine-EPINEPHrine (Marcaine w/EPI) 0.5 %-1:200,000 injection 30 mL   sodium chloride 0.9% infusion Cannot be calculated   ceFAZolin in dextrose (iso-os) (Ancef) IVPB 2 g 2 g              Anesthesia Record               Intraprocedure I/O Totals          Intake    Nitroglycerin Drip 0.00 mL    The total shown is the total volume documented since Anesthesia Start was filed.    sodium chloride 0.9% infusion 600.00 mL    ceFAZolin in dextrose (iso-os) (Ancef) IVPB 2 g 100.00 mL    Total Intake 700 mL       Output    Urine 50 mL    Est. Blood Loss 5 mL    Total Output 55 mL       Net    Net Volume 645 mL          Specimen: No specimens collected     Staff:   Circulator: Jud Stringer Person: Joselyn         Drains and/or Catheters: * None in log *    Tourniquet Times:         Implants:  Implants       Type Name Action Serial No.      Surgical Mesh Sling Implant MESH, PROGRIP LAP, 10 X 15 CM, FLATSHEET - [id number] Implanted               Findings: Moderate right indirect inguinal hernia    Indications: Jonah Lauren is an  83 y.o. male who is having surgery for Inguinal hernia of right side without obstruction or gangrene [K40.90].  He had presented with an intermittent bulge in the right groin area.  Physical examination confirmed a reducible right inguinal hernia.  No obvious hernia on the left.  He was cleared by cardiology.  He had held his Coumadin for 5 days preoperatively, and his INR on the day of surgery was 1.1.  Benefits and risks were reviewed, and he signed consent.    The patient was seen in the preoperative area. The risks, benefits, complications, treatment options, non-operative alternatives, expected recovery and outcomes were discussed with the patient. The possibilities of reaction to medication, pulmonary aspiration, injury to surrounding structures, bleeding, recurrent infection, the need for additional procedures, failure to diagnose a condition, and creating a complication requiring transfusion or operation were discussed with the patient. The patient concurred with the proposed plan, giving informed consent.  The site of surgery was properly noted/marked if necessary per policy. The patient has been actively warmed in preoperative area. Preoperative antibiotics have been ordered and given within 1 hours of incision. Venous thrombosis prophylaxis have been ordered including bilateral sequential compression devices    Procedure Details:   The patient was brought into the operating room and was frank in the supine position. After placement under general anesthesia, KAROLINE hose and SCDs were placed on bilateral lower extremities. Schaffer catheter was placed for urinary drainage. The abdomen then was prepped with Hibiclens and draped in the usual sterile fashion. A cause was taken in the correct agent and procedure were identified.    After injection of half percent Marcaine with epinephrine, a 1 cm periumbilical incision was made through the middle of the umbilicus as there was a fingertip sized fascial defect at the  base of the umbilicus.. This was carried down to the level of the anterior fascia which was grasped with Kochars and opened under direct visualization.  After placement of the Hanson port, and confirmation of intra-abdominal placement, the abdomen was insufflated up to 15 mmHg. On quick examination with the scope, there was no evidence of injury during entry. The parts of the large and small bowel that were visualized all appeared within normal limits. No obvious lesions on the liver. Attention was placed towards the pelvis. The patient was placed in Trendelenburg position. There was an obvious moderate indirect hernia on the right no obvious hernia on the left Two other ports were placed under direct visualization after local anesthesia was injected. A 5 mm port was placed into the left abdomen, and a 10 mm port was placed into the right abdomen.    Attention now was placed towards the right indirect inguinal area. The peritoneum was opened above the direct and indirect spaces using cauterize scissors. Care was taken to avoid the epigastric vessels. Using blunt dissection, the peritoneum was dissected out of both the direct and indirect spaces. The hernia sac was dissected out of the indirect space.  There was a small cord lipoma which was also dissected out of the indirect space.  Thanh's ligament was cleaned off from the level of the pubic tubercle down to the level of the vessels. There was no obvious femoral hernia. Peritoneal flaps were made in all directions. A piece of laparoscopic Progrip mesh then was cut to the patient's size and was placed into the inguinal area. This covered the direct, indirect and femoral spaces. This was fixated in position using absorbable tacker. Several tacks were placed along Thanh's ligament and on the anterior abdominal wall both medial and lateral to the epigastric vessels. The mesh was pushed into the surrounding tissues to allow the Progrip to stick to the underlying  tissue. The patient's intra-abdominal pressure then was decreased down to 10. The peritoneum was closed using a running 2-0 V-LOC suture. There were no holes in the peritoneum.     All intra-abdominal organs then were allowed to fall back in their natural position. There was no evidence of bleeding. The ports then were removed under direct visualization without any evidence of bleeding from the port sites. The pneumoperitoneum was released. The fascia of the periumbilical port was closed using a figure-of-eight stitch of 0 Vicryl suture which effectively close the fascial/hernia defect.  An interrupted stitch of 0 Vicryl suture was also used to close the fascia of the 10 mm port site.  The remaining Marcaine was injected into the fascia of the periumbilical port. All skin was reapproximated using interrupted stitches of 4-0 Vicryl suture. Wounds were dressed with Steri-Strips and Band-Aids. Patient tolerated the procedure well. They were extubated in the operating room and taken to the recovery room in stable condition.    Counts: Correct ×2.  Complications: None.  Drains: None.    Complications:  None; patient tolerated the procedure well.    Disposition: PACU - hemodynamically stable.  Condition: stable         Additional Details: Due to his advanced age and multiple medical issues, he will be observed in the hospital for 24 hours postoperatively.  Okay to restart Coumadin 24 hours postop if no significant bleeding.    Attending Attestation: I was present for the entire procedure.    Angelina Aguilar  Phone Number: [phone]

## 2024-07-01 NOTE — DISCHARGE INSTRUCTIONS
DISCHARGE INSTRUCTIONS    Patient: Jonah Lauren  Surgery Date: 7/1/2024    Age: 83 y.o.   Gender: male  Attending: Angelina Aguilar MD    MRN: [id number]  OR Location: [facility] OR    PCP: Anna Fernandez MD           SURGERY INFORMATION   Procedure performed: Procedure(s):  Laparoscopic right inguinal hernia repair with mesh; possible left   Post-Op diagnosis: Post-op Diagnosis     * Inguinal hernia of right side without obstruction or gangrene [K40.90]   Surgeon:    * Angelina Aguilar - Primary     ACTIVITY RESTRICTIONS   1.  Do not engage in sports, heavy work or lifting until cleared by Dr. Aguilar.    2.  Do not lift/pull/push more than 10 pounds for 2 weeks.   3.  Do not do repetitive bending over/picking up objects off the floor, as this puts strain on your incisions on your abdomen.  4.  You may drive when off of narcotic pain medication.  5.  Continue wearing the compression stockings (KAROLINE hose) until you are walking at least 3 times per day.    BATHING   You may shower starting the day after your surgery.  You may use the Hibiclens soap (the liquid soap you used prior to surgery) at your surgical sites.  However, do not use this soap more than 3 times per week.    WOUND CARE / DRAIN CARE   Remove the Band-Aids before your shower.  Pat dry the Steri-Strips after your shower.  You do NOT need to cover the Steri-Strips after your shower unless there is bleeding or drainage.  2.   Allow the Steri-Strips to fall off on their own.  3.   Apply ice to your surgical area (right groin area) for 20 minutes 3 times a day to help with pain and swelling.  4.   You may also use a heating pad to your abdomen to help with pain, as a heating pad will reduce abdominal spasms.    MEDICATIONS   A narcotic pain medication has been prescribed.  Use this medication only AS NEEDED for severe pain.  2.   You may use Tylenol, instead of, your narcotic pain medication.  3.   Resume all home medications unless previoor  ibuprofen, in addition to, or usly discussed with your surgeon.  You may start your Coumadin today, 7/2/2024.  You will need to contact the Coumadin clinic upon your arrival home to check for timing of your follow-up INR level.    DIET   Diet as tolerated.   For the first 24 hours after surgery, it is recommended that you eat light meals.  Some nausea and vomiting is common for the first 24 hours after surgery.  Drink plenty of fluids.  Minimize your use of caffeinated beverages.    CALLL YOUR SURGEON IF:   Any evidence of infection at your sugical site which can include redness or drainage. Some clear or pinkish drainage is normal for a few days following surgery.  2.   Excessive bleeding from your surgical site. If there is a small amount of bleeding, apply pressure for 20 minutes, then recheck the wound. If the bleeding does not stop, please call your surgeon's office.  3.   Some nausea and vomiting is expected for the first 24 hours after surgery. If you are unable to keep down fluids, or the nausea/vomiting continues beyond 24 hours.  4.   If you are unable to urinate within 8-12 hours after discharge from the hospital.  5.   A low-grade fever after surgery is normal. Notify the office if your temperature goes above 101 degrees.  6.   Any other concerns or questions you have regarding your surgery.      Angelina Aguilar MD, FACS  [facility] General Surgery  [address] [place] [address];   [employer] [address]  [place]  [address] [phone]

## 2024-07-01 NOTE — ANESTHESIA PREPROCEDURE EVALUATION
Patient: Jonah Lauren    Procedure Information       Date/Time: 07/01/24 0800    Procedure: Laparoscopic right inguinal hernia repair with mesh; possible left (Right: Groin) - 8 AM; 1.5 hours    Location: [facility] OR 01 / Virtual [facility] OR    Surgeons: Angelina Aguilar MD            Relevant Problems   Anesthesia (within normal limits)      Cardiac   (+) AIVR (accelerated idioventricular rhythm) (Multi)   (+) Abnormal EKG   (+) Abnormal electrocardiography   (+) Benign hypertension   (+) Essential hypertension   (+) Hyperlipidemia   (+) Mobitz type I incomplete atrioventricular block   (+) Paroxysmal atrial fibrillation (Multi)   (+) Persistent atrial fibrillation (Multi)   (+) Wenckebach      Pulmonary (within normal limits)      Neuro (within normal limits)      GI   (+) Chronic diarrhea of unknown origin      /Renal   (+) BPH (benign prostatic hyperplasia)   (+) Benign prostatic hyperplasia      Liver (within normal limits)      Endocrine   (+) Type 2 diabetes mellitus with diabetic polyneuropathy (Multi)   (+) Type 2 diabetes mellitus with diabetic polyneuropathy, with long-term current use of insulin (Multi)      Hematology   (+) Anemia   (+) Chronic anticoagulation      HEENT   (+) Hearing loss      ID   (+) Onychomycosis of toenail      Digestive   (+) Inguinal hernia of right side without obstruction or gangrene      Genitourinary   (+) CKD (chronic kidney disease) stage 2, GFR 60-89 ml/min      Hematologic   (+) Warfarin anticoagulation       Clinical information reviewed:   Tobacco  Allergies  Meds  Problems  Med Hx  Surg Hx   Fam Hx  Soc   Hx        NPO Detail:  NPO/Void Status  Date of Last Liquid: 06/30/24  Time of Last Liquid: 1600  Date of Last Solid: 06/30/24  Time of Last Solid: 2300  Last Intake Type: Clear fluids         Physical Exam    Airway  Mallampati: II  TM distance: >3 FB  Neck ROM: full     Cardiovascular   Rhythm: irregular  Rate: abnormal     Dental - normal exam      Pulmonary - normal exam     Abdominal            Anesthesia Plan    History of general anesthesia?: yes  History of complications of general anesthesia?: no    ASA 3     general     The patient is not a current smoker.  Patient did not smoke on day of procedure.    intravenous induction   Anesthetic plan and risks discussed with patient.

## 2024-07-01 NOTE — ANESTHESIA PROCEDURE NOTES
Airway  Date/Time: 7/1/2024 8:23 AM  Urgency: elective    Airway not difficult    Staffing  Performed: CRNA   Authorized by: DAVID Hutson    Performed by: DAVID Hutson  Patient location during procedure: OR    Indications and Patient Condition  Indications for airway management: anesthesia  Spontaneous ventilation: present  Sedation level: deep  Preoxygenated: yes  Patient position: sniffing  Mask difficulty assessment: 2 - vent by mask + OA or adjuvant +/- NMBA  Planned trial extubation    Final Airway Details  Final airway type: endotracheal airway      Successful airway: ETT  Cuffed: yes   Successful intubation technique: video laryngoscopy ([facility] MAC)  Facilitating devices/methods: intubating stylet  Endotracheal tube insertion site: oral  Blade: Becka  Blade size: #4  ETT size (mm): 7.5  Cormack-Lehane Classification: grade I - full view of glottis  Placement verified by: chest auscultation and capnometry   Cuff volume (mL): 8  Measured from: lips  ETT to lips (cm): 22  Number of attempts at approach: 1  Ventilation between attempts: none  Number of other approaches attempted: 0    Additional Comments  Atraumatic, dentition, gums, and lips intact.

## 2024-07-02 VITALS
TEMPERATURE: 97.4 F | HEART RATE: 58 BPM | DIASTOLIC BLOOD PRESSURE: 72 MMHG | SYSTOLIC BLOOD PRESSURE: 171 MMHG | HEIGHT: 72 IN | OXYGEN SATURATION: 97 % | BODY MASS INDEX: 22.93 KG/M2 | RESPIRATION RATE: 18 BRPM | WEIGHT: 169.31 LBS

## 2024-07-02 PROBLEM — K40.90 RIGHT INGUINAL HERNIA: Status: RESOLVED | Noted: 2024-07-01 | Resolved: 2024-07-02

## 2024-07-02 LAB
GLUCOSE BLD MANUAL STRIP-MCNC: 106 MG/DL (ref 74–99)
INR PPP: 1.1 (ref 0.9–1.1)
PROTHROMBIN TIME: 12.9 SECONDS (ref 9.8–12.8)

## 2024-07-02 PROCEDURE — 99024 POSTOP FOLLOW-UP VISIT: CPT | Performed by: SURGERY

## 2024-07-02 PROCEDURE — 7100000011 HC EXTENDED STAY RECOVERY HOURLY - NURSING UNIT

## 2024-07-02 PROCEDURE — 82947 ASSAY GLUCOSE BLOOD QUANT: CPT

## 2024-07-02 PROCEDURE — 36415 COLL VENOUS BLD VENIPUNCTURE: CPT | Performed by: STUDENT IN AN ORGANIZED HEALTH CARE EDUCATION/TRAINING PROGRAM

## 2024-07-02 PROCEDURE — 2500000001 HC RX 250 WO HCPCS SELF ADMINISTERED DRUGS (ALT 637 FOR MEDICARE OP): Performed by: STUDENT IN AN ORGANIZED HEALTH CARE EDUCATION/TRAINING PROGRAM

## 2024-07-02 PROCEDURE — 2500000002 HC RX 250 W HCPCS SELF ADMINISTERED DRUGS (ALT 637 FOR MEDICARE OP, ALT 636 FOR OP/ED): Performed by: STUDENT IN AN ORGANIZED HEALTH CARE EDUCATION/TRAINING PROGRAM

## 2024-07-02 PROCEDURE — 85610 PROTHROMBIN TIME: CPT | Performed by: STUDENT IN AN ORGANIZED HEALTH CARE EDUCATION/TRAINING PROGRAM

## 2024-07-02 ASSESSMENT — COGNITIVE AND FUNCTIONAL STATUS - GENERAL
DAILY ACTIVITIY SCORE: 23
WALKING IN HOSPITAL ROOM: A LITTLE
CLIMB 3 TO 5 STEPS WITH RAILING: A LITTLE
MOVING FROM LYING ON BACK TO SITTING ON SIDE OF FLAT BED WITH BEDRAILS: A LITTLE
MOBILITY SCORE: 18
STANDING UP FROM CHAIR USING ARMS: A LITTLE
DRESSING REGULAR LOWER BODY CLOTHING: A LITTLE
TURNING FROM BACK TO SIDE WHILE IN FLAT BAD: A LITTLE
MOVING TO AND FROM BED TO CHAIR: A LITTLE

## 2024-07-02 ASSESSMENT — PAIN DESCRIPTION - LOCATION
LOCATION: ABDOMEN
LOCATION: ABDOMEN

## 2024-07-02 ASSESSMENT — PAIN SCALES - GENERAL
PAINLEVEL_OUTOF10: 4
PAINLEVEL_OUTOF10: 2
PAINLEVEL_OUTOF10: 0 - NO PAIN
PAINLEVEL_OUTOF10: 3
PAINLEVEL_OUTOF10: 7

## 2024-07-02 ASSESSMENT — PAIN - FUNCTIONAL ASSESSMENT
PAIN_FUNCTIONAL_ASSESSMENT: 0-10

## 2024-07-02 NOTE — NURSING NOTE
Patient discharged. IV and tele removed. Tele returned to nurse's station. AVS reviewed with patient. Patient denies questions/concerns. Patient has medications at home from surgery yesterday (wife took them home).

## 2024-07-02 NOTE — CARE PLAN
The patient's goals for the shift include  pain management    The clinical goals for the shift include Patietn pain will be controlled to a tolerable level of 3 or less this shift    Over the shift, the patient did not make progress toward the following goals. Barriers to progression include . Recommendations to address these barriers include .

## 2024-07-02 NOTE — DISCHARGE SUMMARY
Discharge Diagnosis  Inguinal hernia of right side without obstruction or gangrene    Issues Requiring Follow-Up  INR    Test Results Pending At Discharge  Pending Labs       No current pending labs.            Hospital Course   He presented for elective surgery for his right inguinal hernia on 7/1/2024.  He underwent a laparoscopic right inguinal hernia repair with mesh surgery for moderate size indirect inguinal hernia.  Postoperatively, he was admitted for observation for 24 hours due to his advanced age and medical issues.  He remained hemodynamically stable postoperatively.  He was able to void without any difficulty.  No evidence of active bleeding on postoperative day #1.  He was tolerating a diet without any nausea or vomiting.  Ambulating without any difficulty.  After receiving optimal therapy in the hospital, he was discharged home on postoperative day #1.    Pertinent Physical Exam At Time of Discharge  Physical Exam  General: Well-developed, well-nourished and in no acute distress.  Head: Normocephalic. Atraumatic.  Neck/thyroid: Neck is supple.   Eyes: Pupils equal round and reactive to light. Conjunctiva normal.  ENMT: No masses or deformity of external nose. External ears without masses.  Respiratory/Chest:  Normal respiratory effort.  Cardiovascular: Regular rate and rhythm.   Abdomen: Soft, nontender, nondistended.  Mild bruising around periumbilical incision.  No bleeding from incision sites.  Musculoskeletal: Joints and limbs are grossly normal. Normal gait. Normal range of motion of major joints.  Neuro: Oriented to person, place and time. No obvious neurological deficit. Motor strength grossly normal.  Psych: Normal mood and affect.    Home Medications     Medication List      START taking these medications     docusate sodium 100 mg capsule; Commonly known as: Colace; Take 1   capsule (100 mg) by mouth 2 times a day.   HYDROcodone-acetaminophen 7.5-325 mg tablet; Commonly known as: Norco;  "  Take 1 tablet by mouth every 6 hours if needed for severe pain (7 - 10).     CONTINUE taking these medications     Accu-Chek Barbara Plus test strp strip; Generic drug: blood sugar   diagnostic; Apply 1 each topically 4 times a day. Patient is to check   blood sugar 4 times a day   acetaminophen 500 mg tablet; Commonly known as: Tylenol   aMILoride 5 mg tablet; Commonly known as: Midamor   amLODIPine 2.5 mg tablet; Commonly known as: Norvasc   cholecalciferol 25 MCG (1000 UT) tablet; Commonly known as: Vitamin D-3   cyanocobalamin 500 mcg tablet; Commonly known as: Vitamin B-12   Dexcom G6 Sensor device; Generic drug: blood-glucose sensor; For   monitoring in insulin dependent diabetic   Dexcom G6 Transmitter device; Generic drug: blood-glucose transmitter   device; Use as instructed   insulin lispro protamin-lispro 100 unit/mL (75-25) injection; Commonly   known as: HumaLOG Mix 75-25; Inject 17 Units under the skin 2 times a day   with meals.   Jardiance 10 mg; Generic drug: empagliflozin   lancets misc; To check sugars up to four times daily   loperamide 2 mg capsule; Commonly known as: Imodium   losartan 100 mg tablet; Commonly known as: Cozaar; Take 1 tablet (100   mg) by mouth once daily.   magnesium oxide 400 mg (241.3 mg magnesium) tablet; Commonly known as:   Mag-Ox   metFORMIN 500 mg tablet; Commonly known as: Glucophage; Take 1 tablet   (500 mg) by mouth 4 times a day.   multivitamin tablet   nystatin cream; Commonly known as: Mycostatin   pen needle, diabetic 32 gauge x 5/32\" needle   rosuvastatin 5 mg tablet; Commonly known as: Crestor; TAKE 1 TABLET BY   MOUTH DAILY   triamcinolone 0.1 % cream; Commonly known as: Kenalog   warfarin 5 mg tablet; Commonly known as: Coumadin; Take as directed. If   you are unsure how to take this medication, talk to your nurse or doctor.;   Original instructions: TAKE 1-2 TABLETS BY MOUTH ONCE  DAILY AS DIRECTED   BY COUMADIN  CLINIC       Outpatient Follow-Up  Future " Appointments   Date Time Provider Department [place]   7/9/2024 11:00 AM POR  PHARM ACOAG PHARMACIST [id number] [facility]   7/17/2024  8:00 AM Angelina Aguilar MD [id number] [facility]   8/26/2024  3:00 PM Anna Fernandez MD [id number] [facility]   9/16/2024  1:30 PM Aaron Vásquez MD [id number] [facility]   12/16/2024  4:00 PM Ludin Morgan DO [id number] [facility]       Angelina Aguilar MD

## 2024-07-02 NOTE — PROGRESS NOTES
07/02/24 0800   Discharge Planning   Support Systems Family members   Assistance Needed none   Type of Residence Private residence   Who is requesting discharge planning? Provider   Home or Post Acute Services None   Patient expects to be discharged to: home   Does the patient need discharge transport arranged? No     Remote Coverage- DC order noted.  Pt underwent R inguinal hernia repair on 7/1.  Pt is independent at baseline.  Plan is to discharge home with no needs.

## 2024-07-08 ENCOUNTER — TELEPHONE (OUTPATIENT)
Dept: SURGERY | Facility: CLINIC | Age: 84
End: 2024-07-08
Payer: MEDICARE

## 2024-07-08 NOTE — TELEPHONE ENCOUNTER
Patient states that he has some bruising that he noticed. Patient has a cream called DerMend for bruising, patient is wondering if this is ok to use? Please advise.

## 2024-07-09 ENCOUNTER — APPOINTMENT (OUTPATIENT)
Dept: PHARMACY | Facility: HOSPITAL | Age: 84
End: 2024-07-09
Payer: MEDICARE

## 2024-07-09 DIAGNOSIS — I48.19 PERSISTENT ATRIAL FIBRILLATION (MULTI): ICD-10-CM

## 2024-07-09 DIAGNOSIS — I48.0 PAROXYSMAL ATRIAL FIBRILLATION (MULTI): Primary | ICD-10-CM

## 2024-07-09 LAB
POC INR: 1.6 (ref 2–3)
POC PROTHROMBIN TIME: 19.7

## 2024-07-09 PROCEDURE — 85610 PROTHROMBIN TIME: CPT | Mod: QW | Performed by: INTERNAL MEDICINE

## 2024-07-09 PROCEDURE — 99211 OFF/OP EST MAY X REQ PHY/QHP: CPT

## 2024-07-09 NOTE — PATIENT INSTRUCTIONS
Your INR today is low at 1.6. Please boost today with 10mg and then continue your weekly regimen to 7.5mg on Monday and 5 mg all other days. We will follow up in 1 week.    If any questions arise do not hesitate to call us at [phone] M-F from 8:30am-4:30pm or at   [phone] after 5pm or on the weekends. Continue to monitor for any excess bleeding or bruising, especially for blood in your stool.

## 2024-07-09 NOTE — PROGRESS NOTES
Mr. Lauren is a referral from Dr. Morgan for warfarin management of Afib (goal 2-3). He denies any changes in health. He denies any missed or extra doses. No signs or symptoms of bleeding reported. He did have some bruising from the hernia surgery but they are going away with time. No changes in his diet. For his calf pain he said he still takes the Ana Paula's brand Leg Cramp PM, which is a quinine derivative product and it was discussed that this may increase the risk of bleeding with warfarin. We discussed if he starts taking frequently to let the clinic know. He had hernia surgery on July 1 and restarted his warfarin that night. He was given Norco for a few days he only took it for 3 days. Today his INR is low at 1.6. We will boost today with 10mg and then continue the weekly regimen to 7.5mg on Monday and 5mg all other days. We will follow up about one week.

## 2024-07-17 ENCOUNTER — ANTICOAGULATION - WARFARIN VISIT (OUTPATIENT)
Dept: PHARMACY | Facility: HOSPITAL | Age: 84
End: 2024-07-17
Payer: MEDICARE

## 2024-07-17 ENCOUNTER — APPOINTMENT (OUTPATIENT)
Dept: SURGERY | Facility: CLINIC | Age: 84
End: 2024-07-17
Payer: MEDICARE

## 2024-07-17 VITALS
BODY MASS INDEX: 22.81 KG/M2 | HEART RATE: 61 BPM | SYSTOLIC BLOOD PRESSURE: 146 MMHG | WEIGHT: 168.4 LBS | HEIGHT: 72 IN | OXYGEN SATURATION: 96 % | DIASTOLIC BLOOD PRESSURE: 66 MMHG

## 2024-07-17 DIAGNOSIS — K40.90 INGUINAL HERNIA OF RIGHT SIDE WITHOUT OBSTRUCTION OR GANGRENE: Primary | ICD-10-CM

## 2024-07-17 DIAGNOSIS — K42.9 UMBILICAL HERNIA WITHOUT OBSTRUCTION AND WITHOUT GANGRENE: ICD-10-CM

## 2024-07-17 DIAGNOSIS — I48.19 PERSISTENT ATRIAL FIBRILLATION (MULTI): ICD-10-CM

## 2024-07-17 DIAGNOSIS — I48.0 PAROXYSMAL ATRIAL FIBRILLATION (MULTI): Primary | ICD-10-CM

## 2024-07-17 LAB
POC INR: 3.5 (ref 2–3)
POC PROTHROMBIN TIME: 42

## 2024-07-17 PROCEDURE — 1157F ADVNC CARE PLAN IN RCRD: CPT | Performed by: SURGERY

## 2024-07-17 PROCEDURE — 85610 PROTHROMBIN TIME: CPT | Mod: QW | Performed by: INTERNAL MEDICINE

## 2024-07-17 PROCEDURE — 3078F DIAST BP <80 MM HG: CPT | Performed by: SURGERY

## 2024-07-17 PROCEDURE — 1159F MED LIST DOCD IN RCRD: CPT | Performed by: SURGERY

## 2024-07-17 PROCEDURE — 99024 POSTOP FOLLOW-UP VISIT: CPT | Performed by: SURGERY

## 2024-07-17 PROCEDURE — 99211 OFF/OP EST MAY X REQ PHY/QHP: CPT

## 2024-07-17 PROCEDURE — 1036F TOBACCO NON-USER: CPT | Performed by: SURGERY

## 2024-07-17 PROCEDURE — 1160F RVW MEDS BY RX/DR IN RCRD: CPT | Performed by: SURGERY

## 2024-07-17 PROCEDURE — 3077F SYST BP >= 140 MM HG: CPT | Performed by: SURGERY

## 2024-07-17 ASSESSMENT — ENCOUNTER SYMPTOMS
BRUISES/BLEEDS EASILY: 1
ABDOMINAL PAIN: 0
EYE REDNESS: 0
DYSURIA: 0
FATIGUE: 0
NAUSEA: 0
CONSTIPATION: 0
FREQUENCY: 1
FEVER: 0
ARTHRALGIAS: 0
EYE PAIN: 0
POLYPHAGIA: 0
DIARRHEA: 1
WEAKNESS: 0
SHORTNESS OF BREATH: 0
WOUND: 0
CHILLS: 0
SPEECH DIFFICULTY: 0
FLANK PAIN: 0
CONFUSION: 0
MYALGIAS: 1
HEMATURIA: 0
HEADACHES: 0
COUGH: 0
AGITATION: 0
VOMITING: 0

## 2024-07-17 NOTE — PROGRESS NOTES
GENERAL SURGERY OFFICE NOTE    Patient: Jonah Lauren    Age: 83 y.o.   Gender: male    MRN: [id number]    PCP: Anna Fernandez MD        SUBJECTIVE     Chief Complaint  Follow-up (Patient is here for a 2 week Post-Op for his Right Inguinal Hernia on 7/1/24. Patient denies pain, redness or swelling. Patient states that he is doing well over all and only needed to take pain pills from the surgery. )       KEISHA Mckenzie returns to the office for 2-week postop check after undergoing a laparoscopic right inguinal hernia repair with mesh surgery.  At the time of his surgery, he was found to have a moderate right indirect inguinal hernia.  The small umbilical hernia at the base of the umbilicus was repaired at the time of surgery as well with simple suture closure.  Because of his advanced age and medical conditions, he was observed in the hospital overnight, and discharged home the following morning in stable condition.  After surgery, he had a significant amount of bruising in the scrotum.  This has since resolved.  He did have to sleep in a recliner for about a week postoperatively due to pain at his incision sites.  He only took 3 of the Percocet postoperatively.  He has not noticed any pain in the groin area, and the preoperative intermittent bulge has completely resolved.  His bowel and bladder habits are back to baseline.  Of note, he has chronic intermittent diarrhea for which he has had a previous workup.  He uses Lomotil intermittently for the diarrhea, and uses Preparation H and over-the-counter lidocaine ointment on the anal region when he has associated anal burning with the diarrhea.  This has been a longstanding problem.  He has been back on his Coumadin and his last INR check was last Tuesday which was 1.6.  He reports that the Coumadin clinic increased his dosage and he has a recheck of his INR today.  Overall, he is pleased with his surgical outcome.    ROS  Review of Systems   Constitutional:   Negative for chills, fatigue and fever.   HENT:  Negative for congestion, ear pain and hearing loss.    Eyes:  Negative for pain and redness.   Respiratory:  Negative for cough and shortness of breath.    Cardiovascular:  Negative for chest pain and leg swelling.   Gastrointestinal:  Positive for diarrhea. Negative for abdominal pain, constipation, nausea and vomiting.   Endocrine: Negative for polyphagia.   Genitourinary:  Positive for frequency. Negative for dysuria, flank pain and hematuria.   Musculoskeletal:  Positive for myalgias. Negative for arthralgias.   Skin:  Negative for rash and wound.   Allergic/Immunologic: Negative for immunocompromised state.   Neurological:  Negative for speech difficulty, weakness and headaches.   Hematological:  Bruises/bleeds easily.   Psychiatric/Behavioral:  Negative for agitation and confusion.           HISTORY     Past Medical History:   Diagnosis Date    Deficiency of other specified B group vitamins     Vitamin B12 deficiency    Hyperlipidemia     Hypertension     Hypomagnesemia 11/29/2018    Hypomagnesemia    Irregular heart beat     a fib    Lightheadedness 06/01/2023    Personal history of other diseases of male genital organs 04/10/2019    History of benign prostatic hyperplasia    Personal history of other diseases of the digestive system     History of irritable bowel syndrome    Personal history of other endocrine, nutritional and metabolic disease     History of diabetes mellitus    Personal history of urinary (tract) infections 12/23/2021    History of urinary tract infection    Scrotal rash 06/01/2023    Weight loss, unintentional 06/01/2023        Past Surgical History:   Procedure Laterality Date    HERNIA REPAIR Right 07/01/2024    inguinal hernia repair with mesh    PROSTATE SURGERY  10/18/2016    Prostate Surgery    TONSILLECTOMY  10/18/2016    Tonsillectomy        Family History   Problem Relation Name Age of Onset    Bone cancer Mother          Spine     "Heart attack Father      Cancer Sister      Other (Mesthelioma) Brother      Diabetes Brother          Allergies   Allergen Reactions    Atorvastatin Diarrhea and Other        Social History     Tobacco Use   Smoking Status Former    Types: Cigarettes   Smokeless Tobacco Never        Social History     Substance and Sexual Activity   Alcohol Use Never        HOME MEDICATIONS  Current Outpatient Medications   Medication Instructions    acetaminophen (TYLENOL) 500 mg, oral, 4 times daily    aMILoride (Midamor) 5 mg tablet 1 tablet, oral, Daily    amLODIPine (Norvasc) 2.5 mg tablet 1 tablet, oral, Daily    blood sugar diagnostic (Accu-Chek Barbara Plus test strp) strip 1 each, Topical, 4 times daily, Patient is to check blood sugar 4 times a day    cholecalciferol (Vitamin D-3) 25 MCG (1000 UT) tablet 3 tablets, oral, Daily    cyanocobalamin (Vitamin B-12) 500 mcg tablet 1 tablet, oral, Daily    Dexcom G6 Sensor device For monitoring in insulin dependent diabetic    Dexcom G6 Transmitter device Use as instructed    insulin lispro protamin-lispro (HumaLOG Mix 75-25) 100 unit/mL (75-25) injection 17 Units, subcutaneous, 2 times daily (morning and late afternoon)    Jardiance 10 mg, oral, Daily    lancets misc To check sugars up to four times daily    loperamide (IMODIUM) 2 mg, oral, 4 times daily PRN    losartan (COZAAR) 100 mg, oral, Daily    magnesium oxide (Mag-Ox) 400 mg (241.3 mg magnesium) tablet 6 tablets, oral, Daily    metFORMIN (GLUCOPHAGE) 500 mg, oral, 4 times daily    multivitamin tablet 1 tablet, oral, Daily    nystatin (Mycostatin) cream 2 times daily, APPLY A THIN LAYER TO AFFECTED AREA(S) AND RUB IN WELL TWICE DAILY.    pen needle, diabetic 32 gauge x 5/32\" needle 1 each, miscellaneous, 2 times daily    rosuvastatin (CRESTOR) 5 mg, oral, Daily    triamcinolone (Kenalog) 0.1 % cream 1 Application, Topical, 2 times daily    warfarin (Coumadin) 5 mg tablet TAKE 1-2 TABLETS BY MOUTH ONCE  DAILY AS DIRECTED BY " COUMADIN  CLINIC          OBJECTIVE   Last Recorded Vitals.  Blood pressure 146/66, pulse 61, height 1.829 m (6'), weight 76.4 kg (168 lb 6.4 oz), SpO2 96%.     PHYSICAL EXAM  Physical Exam   General: Well-developed, well-nourished and in no acute distress.  Appears younger than stated age.  Head: Normocephalic. Atraumatic.  Neck/thyroid: Neck is supple.   Eyes: Pupils equal round and reactive to light. Conjunctiva normal.  ENMT: No masses or deformity of external nose. External ears without masses.  Respiratory/Chest:  Normal respiratory effort.  Cardiovascular: Regular rate and rhythm.   Abdomen: Soft, nontender, nondistended.  Moderate diastasis recti extending from the upper epigastric region to several centimeters below the umbilicus.  The umbilical hernia at the base of the umbilicus is resolved.  Laparoscopic incision sites are all healing well without any evidence of infection or hernia.  : Normal external genitalia.  No evidence of hernia of either inguinal area.  Musculoskeletal: Joints and limbs are grossly normal. Normal gait. Normal range of motion of major joints.  Neuro: Oriented to person, place and time. No obvious neurological deficit. Motor strength grossly normal.  Psych: Normal mood and affect.    RESULTS   Labs  No results found for this or any previous visit (from the past 24 hour(s)).    Radiology Resutls  No results found.       ASSESSMENT / PLAN   Diagnoses and all orders for this visit:  Inguinal hernia of right side without obstruction or gangrene  Umbilical hernia without obstruction and without gangrene          Plan  1.  Healing well from a surgical standpoint.  Reviewed with patient that the incisional pain ought to continue to improve over the next several weeks.  However, there can be some increased pain in the groin area due to scarring and shrinkage of the mesh over the next 2-3 months.  If this pain becomes severe, the patient was encouraged to contact my office for  reevaluation.  2.  Local wound care was reviewed.  3.  Return to activity as tolerated.  4.  Referred back to primary care physician for all further medical care, but may be referred back to my office for any further surgical needs.  5.  He will continue to be monitored in the Coumadin clinic until his INR becomes therapeutic again.  No evidence of postoperative bleeding with his phonic anticoagulation.  6.  His chronic diarrhea has been previously worked up by GI.  He uses Lomotil intermittently.  The anal burning is only associated when he has a diarrhea episode and is treated with over-the-counter Preparation H and lidocaine.  He may continue with this treatment plan.  Follow-up with his PCP for any other questions or concerns regarding his chronic diarrhea.      Angelina Aguilar MD, FACS  [facility] General Surgery  [id number] [facility];   [address]  [place]  [address] [phone]

## 2024-07-17 NOTE — PATIENT INSTRUCTIONS
1.  You may gradually increase your physical activity as tolerated.  Do not do any activity that causes pain at your incision sites, or your hernia repair site.  Do not do any strenuous activity or heavy lifting for total of 3 months following your surgery.  2.  You may apply lotion or ointment to your surgical incisions if dry and itching.  3.  Continue following up with your Coumadin clinic to monitor your INR.  4.  If you have any other questions or concerns regarding your hernia surgery, please call Dr. Aguilar's office.  [phone]

## 2024-07-17 NOTE — PATIENT INSTRUCTIONS
Your INR today is elevated at 3.5. Please hold today and then continue your weekly regimen to 7.5mg on Monday and 5 mg all other days. We will follow up in 2 weeks.  If any questions arise do not hesitate to call us at [phone] M-F from 8:30am-4:30pm or at   [phone] after 5pm or on the weekends. Continue to monitor for any excess bleeding or bruising, especially for blood in your stool.

## 2024-07-17 NOTE — PROGRESS NOTES
Mr. Lauren is a referral from Dr. Morgan for warfarin management of Afib (goal 2-3). He denies any changes in health. He denies any missed or extra doses. No signs or symptoms of bleeding reported. No changes in his diet. For his calf pain he said he still takes the Ana Paula's brand Leg Cramp PM, which is a quinine derivative product and it was discussed that this may increase the risk of bleeding with warfarin. We discussed if he starts taking frequently to let the clinic know. He boosted last week given a subtherapeutic INR after hernia surgery. He stated since surgery he wasn't really exercising but he was cleared to restart this morning. He normally exercises 5 times a week. Today his INR is elevated at 3.5. We will hold today and then continue the weekly regimen to 7.5mg on Monday and 5mg all other days. We discussed to eat some greens tonight. He is normally very stable on this regimen. We will follow up about 2 weeks.

## 2024-07-19 ENCOUNTER — TELEPHONE (OUTPATIENT)
Dept: SURGERY | Facility: CLINIC | Age: 84
End: 2024-07-19
Payer: MEDICARE

## 2024-07-19 NOTE — TELEPHONE ENCOUNTER
Patient called asking about his inguinal hernia 07/01/24. Patient called asking if he is able to enjoy his hot tub. Patient would like a call back with next steps.

## 2024-07-25 ENCOUNTER — TELEPHONE (OUTPATIENT)
Dept: UROLOGY | Facility: CLINIC | Age: 84
End: 2024-07-25
Payer: MEDICARE

## 2024-07-25 DIAGNOSIS — N39.0 URINARY TRACT INFECTION WITHOUT HEMATURIA, SITE UNSPECIFIED: Primary | ICD-10-CM

## 2024-07-25 RX ORDER — CIPROFLOXACIN 500 MG/1
500 TABLET ORAL 2 TIMES DAILY
Qty: 14 TABLET | Refills: 0 | Status: SHIPPED | OUTPATIENT
Start: 2024-07-25 | End: 2024-08-01

## 2024-07-25 NOTE — TELEPHONE ENCOUNTER
Pt calling in needing Cipro 500 mg sent over to the pharmacy. The keflex isn't helping and when he was back in here in January you had said that we would DC the Keflex and prescribe Cipro

## 2024-08-02 ENCOUNTER — APPOINTMENT (OUTPATIENT)
Dept: PHARMACY | Facility: HOSPITAL | Age: 84
End: 2024-08-02
Payer: MEDICARE

## 2024-08-02 DIAGNOSIS — I48.19 PERSISTENT ATRIAL FIBRILLATION (MULTI): Primary | ICD-10-CM

## 2024-08-02 DIAGNOSIS — I48.0 PAROXYSMAL ATRIAL FIBRILLATION (MULTI): ICD-10-CM

## 2024-08-02 LAB
POC INR: 2.8
POC PROTHROMBIN TIME: 33.1

## 2024-08-02 PROCEDURE — 85610 PROTHROMBIN TIME: CPT | Mod: QW

## 2024-08-02 PROCEDURE — 99211 OFF/OP EST MAY X REQ PHY/QHP: CPT

## 2024-08-02 NOTE — PROGRESS NOTES
Mr. Lauren is a referral from Dr. Morgan for warfarin management of Afib (goal 2-3). He denies any changes in health. He denies any missed or extra doses. No signs or symptoms of bleeding reported. No changes in his diet. For his calf pain he said he still takes the Ana Paula's brand Leg Cramp PM, which is a quinine derivative product and it was discussed that this may increase the risk of bleeding with warfarin. We discussed if he starts taking frequently to let the clinic know. He is back to his normal exercises 5 times a week. Today his INR is in range at 2.8. We will have him continue 7.5mg on Monday and 5mg all other days. He is normally very stable on this regimen. We will follow up about 3 weeks.

## 2024-08-02 NOTE — PATIENT INSTRUCTIONS
Your INR today is in range at 2.8. Please continue 7.5mg on Monday and 5 mg all other days. We will follow up in 3 weeks.  If any questions arise do not hesitate to call us at [phone] M-F from 8:30am-4:30pm or at   [phone] after 5pm or on the weekends. Continue to monitor for any excess bleeding or bruising, especially for blood in your stool.   
Hypernatremia

## 2024-08-05 ENCOUNTER — TELEPHONE (OUTPATIENT)
Dept: UROLOGY | Facility: CLINIC | Age: 84
End: 2024-08-05
Payer: MEDICARE

## 2024-08-09 ENCOUNTER — OFFICE VISIT (OUTPATIENT)
Dept: UROLOGY | Facility: CLINIC | Age: 84
End: 2024-08-09
Payer: MEDICARE

## 2024-08-09 VITALS
SYSTOLIC BLOOD PRESSURE: 167 MMHG | HEIGHT: 72 IN | WEIGHT: 168 LBS | HEART RATE: 55 BPM | BODY MASS INDEX: 22.75 KG/M2 | DIASTOLIC BLOOD PRESSURE: 72 MMHG

## 2024-08-09 DIAGNOSIS — R97.20 ELEVATED PSA: ICD-10-CM

## 2024-08-09 DIAGNOSIS — N40.1 BENIGN PROSTATIC HYPERPLASIA WITH LOWER URINARY TRACT SYMPTOMS, SYMPTOM DETAILS UNSPECIFIED: Primary | ICD-10-CM

## 2024-08-09 LAB
POC BILIRUBIN, URINE: NEGATIVE
POC BLOOD, URINE: NEGATIVE
POC GLUCOSE, URINE: ABNORMAL MG/DL
POC KETONES, URINE: NEGATIVE MG/DL
POC LEUKOCYTES, URINE: NEGATIVE
POC NITRITE,URINE: NEGATIVE
POC PH, URINE: 7 PH
POC PROTEIN, URINE: NEGATIVE MG/DL
POC SPECIFIC GRAVITY, URINE: 1.01
POC UROBILINOGEN, URINE: 0.2 EU/DL

## 2024-08-09 PROCEDURE — 1159F MED LIST DOCD IN RCRD: CPT | Performed by: UROLOGY

## 2024-08-09 PROCEDURE — 81003 URINALYSIS AUTO W/O SCOPE: CPT | Performed by: UROLOGY

## 2024-08-09 PROCEDURE — 3078F DIAST BP <80 MM HG: CPT | Performed by: UROLOGY

## 2024-08-09 PROCEDURE — 99213 OFFICE O/P EST LOW 20 MIN: CPT | Performed by: UROLOGY

## 2024-08-09 PROCEDURE — 3077F SYST BP >= 140 MM HG: CPT | Performed by: UROLOGY

## 2024-08-09 PROCEDURE — 1036F TOBACCO NON-USER: CPT | Performed by: UROLOGY

## 2024-08-09 PROCEDURE — 1157F ADVNC CARE PLAN IN RCRD: CPT | Performed by: UROLOGY

## 2024-08-09 NOTE — PROGRESS NOTES
08/09/2024  Complaining rectal discomfort/pain, especially after bowel movement.  Voiding well    Patient has no nausea, no vomiting, no fever.    MARYANNE: Prostate 1+, no tenderness        We discussed elevated PSA, prostate biopsy versus watchful waiting due to his age  We discussed benign prostate hypertrophy, nocturia, cut back liquid intake after 6  All the questions were answered, the patient expressed understanding and agreed to the plan.     Impression   BPH  Elevated PSA  Nocturia     Plan  PCP to follow bowel issues  Conservative management for elevated PSA  Watch voiding  Yearly MARYANNE and PSA    Chief Complaint   Patient presents with    sore prostate     Patient is here today states his prostate is sore, states his bladder is sore as well.         Physical Exam     TODAYS LAB RESULTS:    [id number]       Lab Results   Component Value Date    PSA 5.80 (H) 02/19/2024      OC Glucose, Urine  NEGATIVE mg/dl 500 (3+) Abnormal  >=1000 (4+) Abnormal    POC Bilirubin, Urine  NEGATIVE NEGATIVE NEGATIVE   POC Ketones, Urine  NEGATIVE mg/dl NEGATIVE NEGATIVE   POC Specific Gravity, Urine  1.005 - 1.035 1.015 1.010   POC Blood, Urine  NEGATIVE NEGATIVE NEGATIVE   POC PH, Urine  No Reference Range Established PH 7.0 6.0   POC Protein, Urine  NEGATIVE, 30 (1+) mg/dl NEGATIVE NEGATIVE   POC Urobilinogen, Urine  0.2, 1.0 EU/DL 0.2 0.2   Poc Nitrite, Urine  NEGATIVE NEGATIVE NEGATIVE   POC Leukocytes, Urine  NEGATIVE NEGATIVE NEGATIVE     ASSESSMENT&PLAN:      IMPRESSIONS:  03/14/2024  Voiding same, nocturia x 3     Patient has no nausea, no vomiting, no fever.     MARYANNE: Deferred     PSA slightly elevated 5.8     We discussed elevated PSA, prostate biopsy versus watchful waiting due to age  We discussed benign prostate hypertrophy, nocturia, cut back liquid intake after 6  All the questions were answered, the patient expressed understanding and agreed to the plan.     Impression   BPH  Elevated PSA  Nocturia      Plan  Conservative management for elevated PSA  Repeat PSA in 6 months  Appointment in 6 months       Chief Complaint   Patient presents with    Benign Prostatic Hypertrophy       Patient here for yearly check. Nocturia 3x. Increased urinary frequency and urgency. He is not taking alpha-blocker.     Scrotal Rash       Scrotal rash resolved. Patient has nystatin cream and triamcinolone cream on hand.          Physical Exam      TODAYS LAB RESULTS:     PSA 02/19/2024  5.80     POC Glucose, Urine  NEGATIVE mg/dl >=1000 (4+) Abnormal    POC Bilirubin, Urine  NEGATIVE NEGATIVE   POC Ketones, Urine  NEGATIVE mg/dl NEGATIVE   POC Specific Gravity, Urine  1.005 - 1.035 1.010   POC Blood, Urine  NEGATIVE NEGATIVE   POC PH, Urine  No Reference Range Established PH 6.0   POC Protein, Urine  NEGATIVE, 30 (1+) mg/dl NEGATIVE   POC Urobilinogen, Urine  0.2, 1.0 EU/DL 0.2   Poc Nitrite, Urine  NEGATIVE NEGATIVE   POC Leukocytes, Urine  NEGATIVE NEGATIVE      ASSESSMENT&PLAN:        IMPRESSIONS:        3/16/2023     Patient here today for yearly f/u, BPH. Patient had Scrotal rash last visit on 2/17/2022.  Last visit plan Keflex 500MG BID x7days, Conservative management,watch voiding,yearly MARYANNE.     PSA No more.     Voiding well, Nocturia x4, Patient complains of some Urinary frequency. Patient does not take any medication for his Prostate. PVR today is 38ML.     No Nausea, No Vomiting, No Fever.  Sonya Willingham LPN     Test Result Flag Reference Goal Last Verified    IO Glucose - Urine Negative REQUIRED    IO Bilirubin Negative REQUIRED    IO Ketones Negative REQUIRED    IO Specific Gravity 1.015 REQUIRED    IO Blood Negative REQUIRED    IO pH 6.0 REQUIRED    IO Protein, Urine Negative REQUIRED    IO Urobilinogen Normal (0.2-1.0 mg/dl) REQUIRED    IO Nitrite, Urine Negative REQUIRED    IO Leukocytes Negative REQUIRED      We discussed benign prostate hypertrophy with moderate voiding symptoms, Flomax 0.4 mg 30-day trial  We  discussed intermittent scrotal rash, continue nystatin cream and triamcinolone cream Keflex  All the questions were answered, the patient expressed understanding and agreed to the plan.     Impression  no urethral stricture  Scrotal rash  BPH  Recurrent urinary tract infection     Plan  Keflex 500 mg twice a day for 7 days, refill x3  continue nystatin cream and triamcinolone cream  Watch voiding  Yearly MARYANNE and appointment        03/17/2022  Voiding okay today     Patient has no nausea, no vomiting, no fever.     Patient here today for year check of BPH     Patient denies dysuria, burning, hematuria. He does admit to some urgency starting the past 2-3 days. He has nocturia x3, but states this is normal.      Patient states he would like to see about having a script of Keflex on hand in case of UTI symptoms.      PVR 160ml        IO Glucose - Urine Negative REQUIRED    IO Bilirubin Negative REQUIRED    IO Ketones Negative REQUIRED    IO Specific Gravity 1.005 REQUIRED    IO Blood Negative REQUIRED    IO pH 6.0 REQUIRED    IO Protein, Urine Negative REQUIRED    IO Urobilinogen Normal (0.2-1.0 mg/dl) REQUIRED    IO Nitrite, Urine Negative REQUIRED    IO Leukocytes Negative      We discussed cystoscopy findings, benign prostate hypertrophy, prostate tissue regrowth  We discussed the Keflex 500 mg twice a day for 7 days for UTI, then as needed  All the questions were answered, the patient expressed understanding and agreed to the plan.     Impression  no urethral stricture  Scrotal rash  BPH  Recurrent urinary tract infection     Plan  Keflex 500 mg twice a day for 7 days, refill x3  Conservative management  Watch voiding  Yearly MARYANNE and appointment     I spent 25 minutes with this patient. Greater than 50% of this time was spent in counseling and/or coordination of care  03/11/21  Voiding well, nocturia Ã--1     Patient has no nausea, no vomiting, no fever.     MARYANNE: Deferred     PSA: No more     IO UA (automated w/o  microscopy)           11Mar2021 01:05PM          Aaron Vásquez     Test Name       Result     Flag        Reference  IO Glucose - Urine         100 mg/dl                              IO Bilirubin       Negative                  IO Ketones      Negative                  IO Specific Gravity         1.020                       IO Blood          Negative                  IO pH               7.0                           IO Protein, Urine            Negative                  IO Urobilinogen                                              Normal (0.2-1.0 mg/dl)  IO Nitrite, Urine              Negative                  IO Leukocytes Negative                  IO Glucose - Urine         100 mg/dl                              IO Bilirubin       Negative                  IO Ketones      Negative                  IO Specific Gravity         1.020                       IO Blood          Negative                  IO pH               7.0                           IO Protein, Urine            Negative                  IO Urobilinogen                                              Normal (0.2-1.0 mg/dl)  IO Nitrite, Urine              Negative                  IO Leukocytes Negative                                                            We discussed cystoscopy findings, benign prostate hypertrophy, prostate tissue regrowth  We discussed the Keflex as needed  All the questions were answered, the patient expressed understanding and agreed to the plan.     Impression  no urethral stricture  Scrotal rash  BPH     Plan  Conservative management  Watch voiding  Keflex 500 mg twice day for 7 days, refill Ã--3 as needed        03/12/2020  Cystoscopy     Still complaining some urethral discharge during the night, voiding well during the day     A cystoscopy was performed under local without difficulty     Findings: Normal anterior urethra, mild prostate tissue regrowth, small post void residual no tumor no stone in the bladder     Pain  evaluation: 0/10 before, 2/10 after.        IO UA (automated w/o microscopy)           12Mar2020 09:24AM          Aaron Vásquez     Test Name       Result     Flag        Reference  IO Glucose - Urine         100 mg/dl                              IO Bilirubin       Negative                  IO Ketones      Negative                  IO Specific Gravity         1.025                       IO Blood          Negative                  IO pH               5.5                           IO Protein, Urine            Negative                  IO Urobilinogen                                              Normal (0.2-1.0 mg/dl)  IO Nitrite, Urine              Negative                  IO Leukocytes Negative                  IO Glucose - Urine         100 mg/dl                              IO Bilirubin       Negative                  IO Ketones      Negative                  IO Specific Gravity         1.025                       IO Blood          Negative                  IO pH               5.5                           IO Protein, Urine            Negative                  IO Urobilinogen                                              Normal (0.2-1.0 mg/dl)  IO Nitrite, Urine              Negative                  IO Leukocytes Negative                     We discussed cystoscopy findings, benign prostate hypertrophy, prostate tissue regrowth  We discussed scrotal skin irritation, continue Lotrisone cream  We discussed the Keflex as needed  All the questions were answered, the patient expressed understanding and agreed to the plan.     Impression  no urethral stricture  Scrotal rash  BPH     Plan  Conservative management  Watch voiding  continue Lotrisone cream as needed  Keflex 500 mg twice day for 7 days, refill Ã--3 as needed                                                     02/03/2020  Complaining urethral stricture symptoms, scrotal irritation etc. voiding well     Patient has no nausea, no vomiting, no fever.     Exam:  Uncircumcised, normal penis, normal testes, minimal scrotal erythematous him a no discharge     IO Ultrasound, measurement post-void resid urine and/or bl cap; no imag         [id number] 04:01PM          Aaron Vásquez     Test Name       Result     Flag        Reference  IO Ultrasound, measurement post void resid urine and/or bl cap; non-imag       118 ml/min                                                                                                                    IO UA (automated w/o microscopy)           [id number] 03:53PM          Aaron Vásquez     Test Name       Result     Flag        Reference  IO Glucose - Urine         Negative                Normal  IO Bilirubin       Negative                Negative  IO Ketones      Negative                Negative  IO Specific Gravity         1.015                     1.000-1.030  IO Blood          Negative                Negative  IO pH               6.5                         5.0-8.0  IO Protein, Urine            Negative                Negative  IO Urobilinogen                                            Normal  Normal (0.2-1.0 mg/dl)  IO Nitrite, Urine              Negative                Negative  IO Leukocytes Negative                Negative  IO Glucose - Urine         Negative                Normal  IO Bilirubin       Negative                Negative  IO Ketones      Negative                Negative  IO Specific Gravity         1.015                     1.000-1.030  IO Blood          Negative                Negative  IO pH               6.5                         5.0-8.0  IO Protein, Urine            Negative                Negative  IO Urobilinogen                                            Normal  Normal (0.2-1.0 mg/dl)  IO Nitrite, Urine              Negative                Negative  IO Leukocytes Negative                Negative                                            We discussed the urethral stricture, cystoscopy  We discussed the scrotal skin rash,  continue Lotrisone cream  All the questions were answered, the patient expressed understanding and agreed to the plan.        Impression  History of urethral stricture  Scrotal rash  BPH     Plan  Cystoscopy  continue Lotrisone cream as neede           01/09/2020  Complaining genitalia, prostate discomfort couple days ago, but resolved today     Scrotal irritation resolved after Lotrisone use     Exam: Genitalia, scrotal area unremarkable; MARYANNE: 1 pus, no nodule no tenderness     We discussed the benign prostate hypertrophy  We discussed scrotal irritation, prostatitis etc.  All the questions were answered, the patient expressed understanding and agreed to the plan.     Impression  Scrotal rashâ€“Much improved  BPH     Plan  continue Lotrisone cream as needed  Yearly MARYANNE and appointment           12/16/2019  Complaining scrotal burning and itching for several days; voiding fine, nocturia Ã--2     Patient has no nausea, no vomiting, no fever.     Exam: Uncircumcised, normal penis, mild erythema scrotum, normal testes     IO UA (automated w/o microscopy)           [id number] 04:17PM          Aaron Vásquez     Test Name       Result     Flag        Reference  IO Glucose - Urine         100 mg/dl                            Normal  IO Bilirubin       Negative                Negative  IO Ketones      Negative                Negative  IO Specific Gravity         1.020                     1.000-1.030  IO Blood          Negative                Negative  IO pH               6.0                         5.0-8.0  IO Protein, Urine            Negative                Negative  IO Urobilinogen                                            Normal  Normal (0.2-1.0 mg/dl)  IO Nitrite, Urine              Negative                Negative  IO Leukocytes Negative                Negative  IO Glucose - Urine         100 mg/dl                            Normal  IO Bilirubin       Negative                Negative  IO Ketones      Negative                 Negative  IO Specific Gravity         1.020                     1.000-1.030  IO Blood          Negative                Negative  IO pH               6.0                         5.0-8.0  IO Protein, Urine            Negative                Negative  IO Urobilinogen                                            Normal  Normal (0.2-1.0 mg/dl)  IO Nitrite, Urine              Negative                Negative  IO Leukocytes Negative                Negative                                            We discussed scrotal irritation, skin rash, Lotrisone cream trial  We discussed benign prostate hypertrophy mild voiding symptoms  All the questions were answered, the patient expressed understanding and agreed to the plan.     Impression  Scrotal rash  BPH     Plan  Lotrisone cream trial  Yearly MARYANNE and appointment           4/1/19 HBM   78-year-old male status post photo selective vaporization several years ago who is here for his annual checkup. The patient has a history of recurrent urinary tract infection and has a prescriptions with several refills at home of Ceftin 500 mg twice daily. He recently developed symptoms of a urinary tract infection with frequency and urinary urgency he called for prescription but since we had not seen him in over a year we advised that he come in for further evaluation. The patient states that his symptoms have significantly diminished and he has not taken any antibiotics he does have a history of diabetes and his blood sugars were elevated approximately a week ago but are now closer to normal. He denies any blood or burning on urination he denies urgency or urinary incontinence.        PSA  2/19/2024 5.8     Surgery  2013 PVP several years ago

## 2024-08-16 ENCOUNTER — TELEPHONE (OUTPATIENT)
Dept: PRIMARY CARE | Facility: CLINIC | Age: 84
End: 2024-08-16
Payer: MEDICARE

## 2024-08-16 NOTE — TELEPHONE ENCOUNTER
Has appointment on 8-26-24--requesting lab order (asked that it include A1c , Magnesium , protein)

## 2024-08-22 PROBLEM — R42 LIGHTHEADEDNESS: Status: ACTIVE | Noted: 2024-08-22

## 2024-08-22 NOTE — TELEPHONE ENCOUNTER
Patient called in again to make sure labs are ordered so he can get done before his appointment on 8/26

## 2024-08-23 ENCOUNTER — APPOINTMENT (OUTPATIENT)
Dept: PHARMACY | Facility: HOSPITAL | Age: 84
End: 2024-08-23
Payer: MEDICARE

## 2024-08-23 DIAGNOSIS — I48.0 PAROXYSMAL ATRIAL FIBRILLATION (MULTI): ICD-10-CM

## 2024-08-23 DIAGNOSIS — I48.19 PERSISTENT ATRIAL FIBRILLATION (MULTI): Primary | ICD-10-CM

## 2024-08-23 LAB
POC INR: 2.8 (ref 2–3)
POC PROTHROMBIN TIME: 33.5

## 2024-08-23 PROCEDURE — 85610 PROTHROMBIN TIME: CPT | Mod: QW | Performed by: INTERNAL MEDICINE

## 2024-08-23 PROCEDURE — 99211 OFF/OP EST MAY X REQ PHY/QHP: CPT

## 2024-08-23 NOTE — PATIENT INSTRUCTIONS
Your INR today is in range at 2.8. Please continue 7.5mg on Monday and 5 mg all other days. We will follow up in 6 weeks.  If any questions arise do not hesitate to call us at [phone] M-F from 8:30am-4:30pm or at   [phone] after 5pm or on the weekends. Continue to monitor for any excess bleeding or bruising, especially for blood in your stool.

## 2024-08-23 NOTE — PROGRESS NOTES
Mr. Lauren is a referral from Dr. Morgan for warfarin management of Afib (goal 2-3). He denies any changes in health. He denies any missed or extra doses. No signs or symptoms of bleeding reported. No changes in his diet. For his calf pain he said he still takes the Ana Paula[employer] brand Leg Cramp PM, which is a quinine derivative product and it was discussed that this may increase the risk of bleeding with warfarin. We discussed if he starts taking frequently to let the clinic know. He is back to his normal exercises 5 times a week. He has started lifting weights too.  Today his INR is in range at 2.8. We will have him continue 7.5mg on Monday and 5mg all other days. He is normally very stable on this regimen. We will follow up about 6 weeks.

## 2024-08-26 ENCOUNTER — APPOINTMENT (OUTPATIENT)
Dept: PRIMARY CARE | Facility: CLINIC | Age: 84
End: 2024-08-26
Payer: MEDICARE

## 2024-08-26 VITALS
OXYGEN SATURATION: 93 % | SYSTOLIC BLOOD PRESSURE: 145 MMHG | WEIGHT: 169 LBS | HEART RATE: 59 BPM | BODY MASS INDEX: 22.89 KG/M2 | DIASTOLIC BLOOD PRESSURE: 71 MMHG | HEIGHT: 72 IN

## 2024-08-26 DIAGNOSIS — E78.2 MIXED HYPERLIPIDEMIA: ICD-10-CM

## 2024-08-26 DIAGNOSIS — I48.0 PAROXYSMAL ATRIAL FIBRILLATION (MULTI): ICD-10-CM

## 2024-08-26 DIAGNOSIS — K52.9 CHRONIC DIARRHEA OF UNKNOWN ORIGIN: ICD-10-CM

## 2024-08-26 DIAGNOSIS — Z79.4 TYPE 2 DIABETES MELLITUS WITHOUT COMPLICATION, WITH LONG-TERM CURRENT USE OF INSULIN (MULTI): ICD-10-CM

## 2024-08-26 DIAGNOSIS — I12.9 HYPERTENSIVE CHRONIC KIDNEY DISEASE WITH STAGE 1 THROUGH STAGE 4 CHRONIC KIDNEY DISEASE, OR UNSPECIFIED CHRONIC KIDNEY DISEASE: ICD-10-CM

## 2024-08-26 DIAGNOSIS — I10 BENIGN HYPERTENSION: ICD-10-CM

## 2024-08-26 DIAGNOSIS — E11.9 TYPE 2 DIABETES MELLITUS WITHOUT COMPLICATION, WITH LONG-TERM CURRENT USE OF INSULIN (MULTI): ICD-10-CM

## 2024-08-26 DIAGNOSIS — Z00.00 ROUTINE GENERAL MEDICAL EXAMINATION AT HEALTH CARE FACILITY: Primary | ICD-10-CM

## 2024-08-26 DIAGNOSIS — N18.31 STAGE 3A CHRONIC KIDNEY DISEASE (MULTI): ICD-10-CM

## 2024-08-26 DIAGNOSIS — I44.1 MOBITZ TYPE I INCOMPLETE ATRIOVENTRICULAR BLOCK: ICD-10-CM

## 2024-08-26 DIAGNOSIS — E55.9 VITAMIN D DEFICIENCY: ICD-10-CM

## 2024-08-26 DIAGNOSIS — Z79.01 WARFARIN ANTICOAGULATION: ICD-10-CM

## 2024-08-26 DIAGNOSIS — E11.42 TYPE 2 DIABETES MELLITUS WITH DIABETIC POLYNEUROPATHY, UNSPECIFIED WHETHER LONG TERM INSULIN USE (MULTI): ICD-10-CM

## 2024-08-26 DIAGNOSIS — N18.2 CKD (CHRONIC KIDNEY DISEASE) STAGE 2, GFR 60-89 ML/MIN: ICD-10-CM

## 2024-08-26 PROBLEM — K58.0 IRRITABLE BOWEL SYNDROME WITH DIARRHEA: Status: ACTIVE | Noted: 2024-08-26

## 2024-08-26 LAB — POC HEMOGLOBIN A1C: 7 % (ref 4.2–6.5)

## 2024-08-26 PROCEDURE — 1123F ACP DISCUSS/DSCN MKR DOCD: CPT | Performed by: FAMILY MEDICINE

## 2024-08-26 PROCEDURE — 1157F ADVNC CARE PLAN IN RCRD: CPT | Performed by: FAMILY MEDICINE

## 2024-08-26 PROCEDURE — 1170F FXNL STATUS ASSESSED: CPT | Performed by: FAMILY MEDICINE

## 2024-08-26 PROCEDURE — 1159F MED LIST DOCD IN RCRD: CPT | Performed by: FAMILY MEDICINE

## 2024-08-26 PROCEDURE — 83036 HEMOGLOBIN GLYCOSYLATED A1C: CPT | Performed by: FAMILY MEDICINE

## 2024-08-26 PROCEDURE — 3078F DIAST BP <80 MM HG: CPT | Performed by: FAMILY MEDICINE

## 2024-08-26 PROCEDURE — 1036F TOBACCO NON-USER: CPT | Performed by: FAMILY MEDICINE

## 2024-08-26 PROCEDURE — 1160F RVW MEDS BY RX/DR IN RCRD: CPT | Performed by: FAMILY MEDICINE

## 2024-08-26 PROCEDURE — 3077F SYST BP >= 140 MM HG: CPT | Performed by: FAMILY MEDICINE

## 2024-08-26 PROCEDURE — G0439 PPPS, SUBSEQ VISIT: HCPCS | Performed by: FAMILY MEDICINE

## 2024-08-26 ASSESSMENT — ACTIVITIES OF DAILY LIVING (ADL)
BATHING: INDEPENDENT
DOING_HOUSEWORK: INDEPENDENT
DRESSING: INDEPENDENT
GROCERY_SHOPPING: INDEPENDENT
MANAGING_FINANCES: INDEPENDENT
TAKING_MEDICATION: INDEPENDENT

## 2024-08-26 ASSESSMENT — ENCOUNTER SYMPTOMS
DEPRESSION: 0
LOSS OF SENSATION IN FEET: 0
OCCASIONAL FEELINGS OF UNSTEADINESS: 0

## 2024-08-26 ASSESSMENT — PATIENT HEALTH QUESTIONNAIRE - PHQ9
SUM OF ALL RESPONSES TO PHQ9 QUESTIONS 1 AND 2: 0
2. FEELING DOWN, DEPRESSED OR HOPELESS: NOT AT ALL
1. LITTLE INTEREST OR PLEASURE IN DOING THINGS: NOT AT ALL

## 2024-08-26 NOTE — PROGRESS NOTES
Subjective   Reason for Visit: Jonah Lauren is an 84 y.o. male here for a Medicare Wellness visit.     Past Medical, Surgical, and Family History reviewed and updated in chart.    Reviewed all medications by prescribing practitioner or clinical pharmacist (such as prescriptions, OTCs, herbal therapies and supplements) and documented in the medical record.  Kidney doctor -[facility] Dr. Morgan Op[facility]- [facility] eye surgeon  HPI  Jonah comes stating his A1C has not been checked, but he is well controlled. Uses strips - we tried to get dexcom for home 2/26/2024. Weight has  been decreasing., he exercises five days a week and struggles to keep weight on. He shaheen love to be 180. Gets up daily walks 150 steps and then does weight bearing exercises   Patient Care Team:  Anna Fernandez MD as PCP - General (Family Medicine)  Aaron Peralta DO as PCP - [facility] Attributed Provider     Review of Systems   All other systems reviewed and are negative.      Objective   Vitals:  /71   Pulse 59   Ht 1.829 m (6')   Wt 76.7 kg (169 lb)   SpO2 93%   BMI 22.92 kg/m²       Physical Exam  Vitals reviewed.   Constitutional:       Appearance: Normal appearance.   HENT:      Head: Normocephalic and atraumatic.      Right Ear: Tympanic membrane normal.      Left Ear: Tympanic membrane normal.      Nose: Nose normal.      Mouth/Throat:      Mouth: Mucous membranes are moist.      Pharynx: Oropharynx is clear.   Eyes:      Extraocular Movements: Extraocular movements intact.      Conjunctiva/sclera: Conjunctivae normal.      Pupils: Pupils are equal, round, and reactive to light.   Cardiovascular:      Rate and Rhythm: Normal rate and regular rhythm.      Pulses: Normal pulses.      Heart sounds: Murmur heard.   Pulmonary:      Effort: Pulmonary effort is normal.      Breath sounds: Normal breath sounds.   Abdominal:      General: Abdomen is flat. Bowel sounds are normal.      Palpations: Abdomen is soft.    Musculoskeletal:         General: Normal range of motion.      Cervical back: Normal range of motion and neck supple.   Skin:     General: Skin is warm and dry.      Capillary Refill: Capillary refill takes less than 2 seconds.   Neurological:      General: No focal deficit present.      Mental Status: He is alert and oriented to person, place, and time.   Psychiatric:         Mood and Affect: Mood normal.         Behavior: Behavior normal.       Assessment/Plan   Problem List Items Addressed This Visit             ICD-10-CM    Benign hypertension- stable and controlled  Amlodipine, losartan,  I10    Chronic diarrhea of unknown origin- would try increasing fuber, could consider daiy cholestyramine to bind, colonoscopy was negative. K52.9    Hyperlipidemia-stable and controlled. Rosuvastatin.  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 Checks sugar religiously with fingerstick four times daily. He would benefit from DEXCOM to better control sugars.  E11.9, Z79.4          Eats oats consistently in diet to help regulate

## 2024-08-27 RX ORDER — BLOOD-GLUCOSE,RECEIVER,CONT
EACH MISCELLANEOUS
Qty: 1 EACH | Refills: 0 | Status: SHIPPED | OUTPATIENT
Start: 2024-08-27

## 2024-08-27 RX ORDER — BLOOD-GLUCOSE SENSOR
EACH MISCELLANEOUS
Qty: 3 EACH | Refills: 11 | Status: SHIPPED | OUTPATIENT
Start: 2024-08-27

## 2024-09-11 ENCOUNTER — HOSPITAL ENCOUNTER (OUTPATIENT)
Facility: HOSPITAL | Age: 84
Setting detail: OUTPATIENT SURGERY
End: 2024-09-11
Attending: SURGERY | Admitting: SURGERY
Payer: MEDICARE

## 2024-09-11 ENCOUNTER — APPOINTMENT (OUTPATIENT)
Dept: SURGERY | Facility: CLINIC | Age: 84
End: 2024-09-11
Payer: MEDICARE

## 2024-09-11 VITALS
SYSTOLIC BLOOD PRESSURE: 125 MMHG | DIASTOLIC BLOOD PRESSURE: 55 MMHG | BODY MASS INDEX: 22.75 KG/M2 | OXYGEN SATURATION: 93 % | WEIGHT: 168 LBS | HEIGHT: 72 IN | HEART RATE: 57 BPM

## 2024-09-11 DIAGNOSIS — K62.89 MASS OF ANUS: Primary | ICD-10-CM

## 2024-09-11 DIAGNOSIS — K52.9 CHRONIC DIARRHEA: ICD-10-CM

## 2024-09-11 DIAGNOSIS — K62.89 ANAL PAIN: ICD-10-CM

## 2024-09-11 PROCEDURE — 3074F SYST BP LT 130 MM HG: CPT | Performed by: SURGERY

## 2024-09-11 PROCEDURE — 3078F DIAST BP <80 MM HG: CPT | Performed by: SURGERY

## 2024-09-11 PROCEDURE — 99214 OFFICE O/P EST MOD 30 MIN: CPT | Performed by: SURGERY

## 2024-09-11 PROCEDURE — 1036F TOBACCO NON-USER: CPT | Performed by: SURGERY

## 2024-09-11 PROCEDURE — 1157F ADVNC CARE PLAN IN RCRD: CPT | Performed by: SURGERY

## 2024-09-11 PROCEDURE — 1160F RVW MEDS BY RX/DR IN RCRD: CPT | Performed by: SURGERY

## 2024-09-11 PROCEDURE — 1159F MED LIST DOCD IN RCRD: CPT | Performed by: SURGERY

## 2024-09-11 PROCEDURE — 1123F ACP DISCUSS/DSCN MKR DOCD: CPT | Performed by: SURGERY

## 2024-09-11 ASSESSMENT — ENCOUNTER SYMPTOMS
POLYPHAGIA: 0
FLANK PAIN: 0
ABDOMINAL PAIN: 0
COUGH: 0
NAUSEA: 0
FEVER: 0
WOUND: 0
SHORTNESS OF BREATH: 0
WEAKNESS: 0
DYSURIA: 0
HEMATURIA: 0
SPEECH DIFFICULTY: 0
EYE PAIN: 0
HEADACHES: 0
FATIGUE: 0
CHILLS: 0
VOMITING: 0
CONSTIPATION: 0
ARTHRALGIAS: 0
BRUISES/BLEEDS EASILY: 1
MYALGIAS: 1
EYE REDNESS: 0
DIARRHEA: 1
AGITATION: 0
FREQUENCY: 1
CONFUSION: 0

## 2024-09-11 NOTE — PROGRESS NOTES
GENERAL SURGERY OFFICE NOTE    Patient: Jonah Lauren    Age: 84 y.o.   Gender: male    MRN: [id number]    PCP: Anna Fernandez MD        SUBJECTIVE     Chief Complaint  Follow-up (Established patient here for possible anal fissure. Patient states that he has been having itching and burning. Patient has been using over the counter creams that have been helping. Patient states that he does not have much pain. )       KEISHA Mckenzie returns to the office with a new complaint of anal pain.  He states that he has had chronic issues with diarrhea and many years ago was told by GI that he had IBS with diarrhea predominance.  He is undergone multiple colonoscopies in the past with the last colonoscopy done less than 3 years ago.  To help control his diarrhea, he eats a high-fiber diet and takes 2 Imodium tablets every day.  He has had some baseline anal burning and itching issues, but about a month ago he started to have increased symptoms to the point where he is using Preparation H twice a day and lidocaine ointment once a day before bed.  He reports having a prostate exam about a month ago by urology without mention of any anal abnormalities.  He does not notice any blood in the stool or on the tissue paper.  With the worsening symptoms not responding to medications, he wanted to be checked out.  He was concerned that he might have an anal fissure.  From a hernia standpoint, he is healing well and not having any pain or evidence of recurrent hernia in his groin area.    ROS  Review of Systems   Constitutional:  Negative for chills, fatigue and fever.   HENT:  Negative for congestion, ear pain and hearing loss.    Eyes:  Negative for pain and redness.   Respiratory:  Negative for cough and shortness of breath.    Cardiovascular:  Negative for chest pain and leg swelling.   Gastrointestinal:  Positive for diarrhea. Negative for abdominal pain, constipation, nausea and vomiting.   Endocrine: Negative for  polyphagia.   Genitourinary:  Positive for frequency. Negative for dysuria, flank pain and hematuria.   Musculoskeletal:  Positive for myalgias. Negative for arthralgias.   Skin:  Negative for rash and wound.   Allergic/Immunologic: Negative for immunocompromised state.   Neurological:  Negative for speech difficulty, weakness and headaches.   Hematological:  Bruises/bleeds easily.   Psychiatric/Behavioral:  Negative for agitation and confusion.           HISTORY     Past Medical History:   Diagnosis Date    Deficiency of other specified B group vitamins     Vitamin B12 deficiency    Hyperlipidemia     Hypertension     Hypomagnesemia 11/29/2018    Hypomagnesemia    Irregular heart beat     a fib    Lightheadedness 06/01/2023    Personal history of other diseases of male genital organs 04/10/2019    History of benign prostatic hyperplasia    Personal history of other diseases of the digestive system     History of irritable bowel syndrome    Personal history of other endocrine, nutritional and metabolic disease     History of diabetes mellitus    Personal history of urinary (tract) infections 12/23/2021    History of urinary tract infection    Scrotal rash 06/01/2023    Weight loss, unintentional 06/01/2023        Past Surgical History:   Procedure Laterality Date    HERNIA REPAIR Right 07/01/2024    inguinal hernia repair with mesh    PROSTATE SURGERY  10/18/2016    Prostate Surgery    TONSILLECTOMY  10/18/2016    Tonsillectomy        Family History   Problem Relation Name Age of Onset    Bone cancer Mother          Spine    Heart attack Father      Cancer Sister      Other (Mesthelioma) Brother      Diabetes Brother          Allergies   Allergen Reactions    Atorvastatin Diarrhea and Other        Social History     Tobacco Use   Smoking Status Former    Types: Cigarettes   Smokeless Tobacco Never        Social History     Substance and Sexual Activity   Alcohol Use Never        HOME MEDICATIONS  Current Outpatient  "Medications   Medication Instructions    acetaminophen (TYLENOL) 500 mg, oral, 4 times daily    aMILoride (Midamor) 5 mg tablet 1 tablet, oral, Daily    amLODIPine (Norvasc) 2.5 mg tablet 1 tablet, oral, Daily    blood sugar diagnostic (Accu-Chek Barbara Plus test strp) strip 1 each, Topical, 4 times daily, Patient is to check blood sugar 4 times a day    cholecalciferol (Vitamin D-3) 25 MCG (1000 UT) tablet 3 tablets, oral, Daily    cyanocobalamin (Vitamin B-12) 500 mcg tablet 1 tablet, oral, Daily    Dexcom G6 Sensor device For monitoring in insulin dependent diabetic    Dexcom G6 Transmitter device Use as instructed    Dexcom G7  misc Use as instructed    Dexcom G7 Sensor device For use with dexcom reciever    insulin lispro protamin-lispro (HumaLOG Mix 75-25) 100 unit/mL (75-25) injection 17 Units, subcutaneous, 2 times daily (morning and late afternoon)    lancets misc To check sugars up to four times daily    lidocaine 5 % gel 1 Application, topical (top), 3 times daily    loperamide (IMODIUM) 2 mg, oral, 4 times daily PRN    losartan (COZAAR) 100 mg, oral, Daily    magnesium oxide (Mag-Ox) 400 mg (241.3 mg magnesium) tablet 6 tablets, oral, Daily    metFORMIN (GLUCOPHAGE) 500 mg, oral, 4 times daily    multivitamin tablet 1 tablet, oral, Daily    nystatin (Mycostatin) cream 2 times daily, APPLY A THIN LAYER TO AFFECTED AREA(S) AND RUB IN WELL TWICE DAILY.    pen needle, diabetic 32 gauge x 5/32\" needle 1 each, miscellaneous, 2 times daily    rosuvastatin (CRESTOR) 5 mg, oral, Daily    triamcinolone (Kenalog) 0.1 % cream 1 Application, Topical, 2 times daily    warfarin (Coumadin) 5 mg tablet TAKE 1-2 TABLETS BY MOUTH ONCE  DAILY AS DIRECTED BY COUMADIN  CLINIC          OBJECTIVE   Last Recorded Vitals.  Blood pressure 125/55, pulse 57, height 1.829 m (6'), weight 76.2 kg (168 lb), SpO2 93%.     PHYSICAL EXAM  Physical Exam   General: Well-developed, well-nourished and in no acute distress.  Appears " younger than stated age.  Head: Normocephalic. Atraumatic.  Neck/thyroid: Neck is supple.   Eyes: Pupils equal round and reactive to light. Conjunctiva normal.  ENMT: No masses or deformity of external nose. External ears without masses.  Respiratory/Chest:  Normal respiratory effort.  Cardiovascular: Regular rate and rhythm.   Abdomen: Soft, nontender, nondistended.  Moderate diastasis recti extending from the upper epigastric region to several centimeters below the umbilicus.  Laparoscopic incision sites are all healing well without any evidence of infection or hernia.  : Normal external genitalia.  No evidence of hernia of either inguinal area.  Rectal: No evidence of pilonidal cyst.  On inspection, there is a firm nodular mass on the anterior anoderm that measures approximately 2 x 1.5 cm.  It does appear to track on the skin of the perineum for about 1.5 cm.  Quite firm.  No active bleeding.  Digital examination showed an intact sphincter tone.  No evidence of internal mass.  No significant internal hemorrhoids.  No active bleeding.  Prostate is minimally enlarged.  Musculoskeletal: Joints and limbs are grossly normal. Normal gait. Normal range of motion of major joints.  Neuro: Oriented to person, place and time. No obvious neurological deficit. Motor strength grossly normal.  Psych: Normal mood and affect.    RESULTS   Labs  No results found for this or any previous visit (from the past 24 hour(s)).    Radiology Resutls  No results found.       ASSESSMENT / PLAN   Diagnoses and all orders for this visit:  Mass of anus  -     Case Request Operating Room: Excision of anal lesion  Anal pain  -     lidocaine 5 % gel; Apply 1 Application topically 3 times a day.  Chronic diarrhea        Plan  1.  He has chronic diarrhea issues which she seems to have managed well over the years using a high-fiber diet (eating oatmeal every morning) and taking 2 Imodium tablets every morning.  His last colonoscopy reportedly was  less than 3 years ago.  2.  Although he has had some longstanding burning/itching issues of the anal region, in the past these had only been associated when he had the diarrhea issues.  Over the last month, the symptoms seem to be worsening.  On his exam there is concern for an anal lesion.  Need to rule out underlying anal cancer.  Therefore, recommended a wide excision of the anal mass under general anesthesia.  The benefits and risk of this were reviewed with the patient.  Risk included, but not limited to, infection, bleeding, injury to surrounding tissue, injury to the underlying sphincter muscle, need for further surgery, allergic reaction to medication and even death.  3.  While awaiting surgical intervention, lidocaine ointment has been prescribed from pain management.  4.  He will need to hold his Coumadin for 5 days prior to surgery.  Plan INR on day of surgery.      Angelina Aguilar MD, FACS  [facility] General Surgery  [address] [place] [address];   [employer] [address]  [place]  [address] [phone]

## 2024-09-11 NOTE — PATIENT INSTRUCTIONS
1.  The lesion on your inguinal region needs to be excised and biopsied.  This needs to be done in the operating room under anesthesia.  Surgery for removal of this annual lesion is scheduled for Monday, 9/30/2024.  2.  You will need to hold your Coumadin/warfarin for 5 days prior to surgery.  Do not take your Coumadin/warfarin starting on 9/25/2024.  3.  You will need to take 2 fleets enemas prior to your surgery.  Please see the anal surgery sheet for details.  4.  A numbing cream/ointment (lidocaine) has been sent to your pharmacy.  You may use this as needed to help control your pain.  You may also apply ice to your anal region to help with pain.

## 2024-09-13 ENCOUNTER — TELEPHONE (OUTPATIENT)
Dept: PREADMISSION TESTING | Facility: HOSPITAL | Age: 84
End: 2024-09-13
Payer: MEDICARE

## 2024-09-16 ENCOUNTER — APPOINTMENT (OUTPATIENT)
Dept: UROLOGY | Facility: CLINIC | Age: 84
End: 2024-09-16
Payer: MEDICARE

## 2024-09-16 ENCOUNTER — TELEPHONE (OUTPATIENT)
Dept: SURGERY | Facility: CLINIC | Age: 84
End: 2024-09-16

## 2024-09-17 NOTE — TELEPHONE ENCOUNTER
Patient called back and is okay with the date 10/03/2024. Surgery has moved the date to 10/03/2024. Please advise.

## 2024-09-24 ENCOUNTER — TELEPHONE (OUTPATIENT)
Dept: PRIMARY CARE | Facility: CLINIC | Age: 84
End: 2024-09-24

## 2024-09-24 ENCOUNTER — APPOINTMENT (OUTPATIENT)
Dept: PHARMACY | Facility: HOSPITAL | Age: 84
End: 2024-09-24
Payer: MEDICARE

## 2024-09-24 DIAGNOSIS — Z79.4 TYPE 2 DIABETES MELLITUS WITHOUT COMPLICATION, WITH LONG-TERM CURRENT USE OF INSULIN (MULTI): ICD-10-CM

## 2024-09-24 DIAGNOSIS — E11.9 TYPE 2 DIABETES MELLITUS WITHOUT COMPLICATION, WITH LONG-TERM CURRENT USE OF INSULIN (MULTI): ICD-10-CM

## 2024-09-24 DIAGNOSIS — E11.42 TYPE 2 DIABETES MELLITUS WITH DIABETIC POLYNEUROPATHY, UNSPECIFIED WHETHER LONG TERM INSULIN USE: ICD-10-CM

## 2024-09-24 NOTE — TELEPHONE ENCOUNTER
Patient is asking if you can send the prescription for the Dexcom to [facility] in [place]? They have not received the prescription

## 2024-09-25 ENCOUNTER — TELEPHONE (OUTPATIENT)
Dept: UROLOGY | Facility: CLINIC | Age: 84
End: 2024-09-25
Payer: MEDICARE

## 2024-09-25 DIAGNOSIS — Z98.52 STATUS POST VASECTOMY: Primary | ICD-10-CM

## 2024-09-25 RX ORDER — CEPHALEXIN 500 MG/1
500 CAPSULE ORAL 2 TIMES DAILY
Qty: 14 CAPSULE | Refills: 0 | Status: SHIPPED | OUTPATIENT
Start: 2024-09-25 | End: 2024-10-02

## 2024-09-25 RX ORDER — BLOOD-GLUCOSE,RECEIVER,CONT
EACH MISCELLANEOUS
Qty: 1 EACH | Refills: 0 | Status: SHIPPED | OUTPATIENT
Start: 2024-09-25

## 2024-09-25 RX ORDER — BLOOD-GLUCOSE SENSOR
EACH MISCELLANEOUS
Qty: 3 EACH | Refills: 11 | Status: SHIPPED | OUTPATIENT
Start: 2024-09-25

## 2024-09-25 NOTE — TELEPHONE ENCOUNTER
Patient has symptoms of UTI, cloudy urine, painful urination.  Asking if you could call in a antibiotic please  [facility]  Thank you

## 2024-09-27 ENCOUNTER — ANTICOAGULATION - WARFARIN VISIT (OUTPATIENT)
Dept: PHARMACY | Facility: HOSPITAL | Age: 84
End: 2024-09-27
Payer: MEDICARE

## 2024-09-27 DIAGNOSIS — I48.0 PAROXYSMAL ATRIAL FIBRILLATION (MULTI): ICD-10-CM

## 2024-09-27 DIAGNOSIS — I48.19 PERSISTENT ATRIAL FIBRILLATION (MULTI): Primary | ICD-10-CM

## 2024-09-27 LAB
POC INR: 2.3 (ref 2–3)
POC PROTHROMBIN TIME: 27.6

## 2024-09-27 PROCEDURE — 99211 OFF/OP EST MAY X REQ PHY/QHP: CPT

## 2024-09-27 PROCEDURE — 85610 PROTHROMBIN TIME: CPT | Mod: QW | Performed by: INTERNAL MEDICINE

## 2024-09-27 NOTE — PROGRESS NOTES
Mr. Lauren is a referral from Dr. Morgan for warfarin management of Afib (goal 2-3). He denies any changes in health. He denies any missed or extra doses. No signs or symptoms of bleeding reported. No changes in his diet. For his calf pain he said he still takes the Ana Paula's brand Leg Cramp PM, which is a quinine derivative product and it was discussed that this may increase the risk of bleeding with warfarin. We discussed if he starts taking frequently to let the clinic know. He is back to his normal exercises 5 times a week. He has started lifting weights too.  Today his INR is in range at 2.3. He is to have surgery on 10/3. He is to hold the warfarin for 5 days prior starting on 9/28- day of surgery. He did report receiving cardiac clearance for this. He will resume his warfarin of 7.5mg on Monday and 5mg all other days once he is cleared from the surgeon to restart it. He is normally very stable on this regimen. When he does restart the warfarin he is to take 7.5mg. He is to call the clinic if he doesn't restart his warfarin by 10/3. We will follow up about 1 week after being back on the medication.

## 2024-09-27 NOTE — PATIENT INSTRUCTIONS
Your INR today is in range at 2.3. Please start holding the warfarin tomorrow on 9/28 until your surgery on 10/3. Please resume your regimen of 7.5mg on Monday and 5 mg all other days once the surgeon tells you to start it. The day you restart it please take 7.5mg.  We will follow up in 1 week after surgery. If you do not restart the warfarin by 10/3 please call and inform the clinic.  If any questions arise do not hesitate to call us at [phone] M-F from 8:30am-4:30pm or at   [phone] after 5pm or on the weekends. Continue to monitor for any excess bleeding or bruising, especially for blood in your stool.

## 2024-09-30 ENCOUNTER — APPOINTMENT (OUTPATIENT)
Dept: PHARMACY | Facility: HOSPITAL | Age: 84
End: 2024-09-30
Payer: MEDICARE

## 2024-09-30 ENCOUNTER — APPOINTMENT (OUTPATIENT)
Dept: RADIOLOGY | Facility: HOSPITAL | Age: 84
End: 2024-09-30
Payer: MEDICARE

## 2024-09-30 ENCOUNTER — HOSPITAL ENCOUNTER (EMERGENCY)
Facility: HOSPITAL | Age: 84
Discharge: HOME | End: 2024-09-30
Attending: EMERGENCY MEDICINE
Payer: MEDICARE

## 2024-09-30 VITALS
TEMPERATURE: 97.5 F | OXYGEN SATURATION: 96 % | HEART RATE: 61 BPM | SYSTOLIC BLOOD PRESSURE: 158 MMHG | BODY MASS INDEX: 23.43 KG/M2 | WEIGHT: 173 LBS | RESPIRATION RATE: 16 BRPM | DIASTOLIC BLOOD PRESSURE: 67 MMHG | HEIGHT: 72 IN

## 2024-09-30 DIAGNOSIS — S32.414A: Primary | ICD-10-CM

## 2024-09-30 LAB
ALBUMIN SERPL BCP-MCNC: 4.2 G/DL (ref 3.4–5)
ALP SERPL-CCNC: 42 U/L (ref 33–136)
ALT SERPL W P-5'-P-CCNC: 26 U/L (ref 10–52)
ANION GAP SERPL CALC-SCNC: 11 MMOL/L (ref 10–20)
AST SERPL W P-5'-P-CCNC: 23 U/L (ref 9–39)
BASOPHILS # BLD AUTO: 0.06 X10*3/UL (ref 0–0.1)
BASOPHILS NFR BLD AUTO: 0.9 %
BILIRUB SERPL-MCNC: 0.5 MG/DL (ref 0–1.2)
BUN SERPL-MCNC: 22 MG/DL (ref 6–23)
CALCIUM SERPL-MCNC: 8.9 MG/DL (ref 8.6–10.3)
CHLORIDE SERPL-SCNC: 106 MMOL/L (ref 98–107)
CO2 SERPL-SCNC: 27 MMOL/L (ref 21–32)
CREAT SERPL-MCNC: 1.05 MG/DL (ref 0.5–1.3)
EGFRCR SERPLBLD CKD-EPI 2021: 69 ML/MIN/1.73M*2
EOSINOPHIL # BLD AUTO: 0.1 X10*3/UL (ref 0–0.4)
EOSINOPHIL NFR BLD AUTO: 1.5 %
ERYTHROCYTE [DISTWIDTH] IN BLOOD BY AUTOMATED COUNT: 15.9 % (ref 11.5–14.5)
GLUCOSE BLD MANUAL STRIP-MCNC: 93 MG/DL (ref 74–99)
GLUCOSE SERPL-MCNC: 121 MG/DL (ref 74–99)
HCT VFR BLD AUTO: 38 % (ref 41–52)
HGB BLD-MCNC: 12.3 G/DL (ref 13.5–17.5)
IMM GRANULOCYTES # BLD AUTO: 0.03 X10*3/UL (ref 0–0.5)
IMM GRANULOCYTES NFR BLD AUTO: 0.5 % (ref 0–0.9)
INR PPP: 1.7 (ref 0.9–1.1)
LYMPHOCYTES # BLD AUTO: 0.8 X10*3/UL (ref 0.8–3)
LYMPHOCYTES NFR BLD AUTO: 12.1 %
MCH RBC QN AUTO: 28 PG (ref 26–34)
MCHC RBC AUTO-ENTMCNC: 32.4 G/DL (ref 32–36)
MCV RBC AUTO: 86 FL (ref 80–100)
MONOCYTES # BLD AUTO: 0.67 X10*3/UL (ref 0.05–0.8)
MONOCYTES NFR BLD AUTO: 10.1 %
NEUTROPHILS # BLD AUTO: 4.97 X10*3/UL (ref 1.6–5.5)
NEUTROPHILS NFR BLD AUTO: 74.9 %
NRBC BLD-RTO: 0 /100 WBCS (ref 0–0)
PLATELET # BLD AUTO: 199 X10*3/UL (ref 150–450)
POTASSIUM SERPL-SCNC: 4.3 MMOL/L (ref 3.5–5.3)
PROT SERPL-MCNC: 6.7 G/DL (ref 6.4–8.2)
PROTHROMBIN TIME: 19.5 SECONDS (ref 9.8–12.8)
RBC # BLD AUTO: 4.4 X10*6/UL (ref 4.5–5.9)
SODIUM SERPL-SCNC: 140 MMOL/L (ref 136–145)
WBC # BLD AUTO: 6.6 X10*3/UL (ref 4.4–11.3)

## 2024-09-30 PROCEDURE — 73502 X-RAY EXAM HIP UNI 2-3 VIEWS: CPT | Mod: RT

## 2024-09-30 PROCEDURE — 80053 COMPREHEN METABOLIC PANEL: CPT | Performed by: EMERGENCY MEDICINE

## 2024-09-30 PROCEDURE — 99284 EMERGENCY DEPT VISIT MOD MDM: CPT

## 2024-09-30 PROCEDURE — 72192 CT PELVIS W/O DYE: CPT

## 2024-09-30 PROCEDURE — 85610 PROTHROMBIN TIME: CPT | Performed by: EMERGENCY MEDICINE

## 2024-09-30 PROCEDURE — 73502 X-RAY EXAM HIP UNI 2-3 VIEWS: CPT | Mod: RIGHT SIDE | Performed by: RADIOLOGY

## 2024-09-30 PROCEDURE — 99285 EMERGENCY DEPT VISIT HI MDM: CPT

## 2024-09-30 PROCEDURE — 99291 CRITICAL CARE FIRST HOUR: CPT | Performed by: EMERGENCY MEDICINE

## 2024-09-30 PROCEDURE — 36415 COLL VENOUS BLD VENIPUNCTURE: CPT | Performed by: EMERGENCY MEDICINE

## 2024-09-30 PROCEDURE — 2500000001 HC RX 250 WO HCPCS SELF ADMINISTERED DRUGS (ALT 637 FOR MEDICARE OP): Performed by: EMERGENCY MEDICINE

## 2024-09-30 PROCEDURE — 72192 CT PELVIS W/O DYE: CPT | Performed by: RADIOLOGY

## 2024-09-30 PROCEDURE — 82947 ASSAY GLUCOSE BLOOD QUANT: CPT

## 2024-09-30 PROCEDURE — 85025 COMPLETE CBC W/AUTO DIFF WBC: CPT | Performed by: EMERGENCY MEDICINE

## 2024-09-30 RX ORDER — OXYCODONE AND ACETAMINOPHEN 5; 325 MG/1; MG/1
1 TABLET ORAL ONCE
Status: COMPLETED | OUTPATIENT
Start: 2024-09-30 | End: 2024-09-30

## 2024-09-30 RX ORDER — OXYCODONE AND ACETAMINOPHEN 5; 325 MG/1; MG/1
1 TABLET ORAL EVERY 6 HOURS PRN
Qty: 5 TABLET | Refills: 0 | Status: SHIPPED | OUTPATIENT
Start: 2024-09-30 | End: 2024-10-03

## 2024-09-30 ASSESSMENT — PAIN - FUNCTIONAL ASSESSMENT: PAIN_FUNCTIONAL_ASSESSMENT: 0-10

## 2024-09-30 ASSESSMENT — COLUMBIA-SUICIDE SEVERITY RATING SCALE - C-SSRS
2. HAVE YOU ACTUALLY HAD ANY THOUGHTS OF KILLING YOURSELF?: NO
6. HAVE YOU EVER DONE ANYTHING, STARTED TO DO ANYTHING, OR PREPARED TO DO ANYTHING TO END YOUR LIFE?: NO
1. IN THE PAST MONTH, HAVE YOU WISHED YOU WERE DEAD OR WISHED YOU COULD GO TO SLEEP AND NOT WAKE UP?: NO

## 2024-09-30 ASSESSMENT — PAIN SCALES - GENERAL
PAINLEVEL_OUTOF10: 9
PAINLEVEL_OUTOF10: 0 - NO PAIN
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
## 2024-10-01 ENCOUNTER — TELEPHONE (OUTPATIENT)
Dept: SURGERY | Facility: CLINIC | Age: 84
End: 2024-10-01
Payer: MEDICARE
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
## 2024-10-11 ENCOUNTER — TELEPHONE (OUTPATIENT)
Dept: SURGERY | Facility: HOSPITAL | Age: 84
End: 2024-10-11
Payer: MEDICARE

## 2024-10-11 ENCOUNTER — APPOINTMENT (OUTPATIENT)
Dept: PHARMACY | Facility: HOSPITAL | Age: 84
End: 2024-10-11
Payer: MEDICARE

## 2024-10-11 NOTE — TELEPHONE ENCOUNTER
I talked with Jonah today.  He continues to have some hip pain to the point where he is sleeping in a recliner.  He continues to ambulate with a walker.  He does not have follow-up with his orthopedic surgeon until Tuesday, 10/15/2024.  He has developed a decubitus ulcer.  However, he states that his anal pain has resolved with the lidocaine ointment.  He is not sure he has a lesion on his anoderm anymore.  Will have him be seen in the office for an anal exam to see if surgery for excision of an anal lesion is still needed.    ----- Message from Angelina Aguilar sent at 10/1/2024 12:23 PM EDT -----  Regarding: Reschedule surgery  The excision of his anal mass surgery has been postponed due to an acute hairline fracture of his acetabulum.  He is to follow-up with orthopedics (Dr. Martel).  Discussed with patient regarding rescheduling his anal surgery once he has been evaluated by orthopedics.  He was instructed to restart his Coumadin previously, and will need to hold this 5 days preoperatively and check INR on day of surgery.

## 2024-10-15 ENCOUNTER — OFFICE VISIT (OUTPATIENT)
Dept: ORTHOPEDIC SURGERY | Facility: HOSPITAL | Age: 84
End: 2024-10-15
Payer: MEDICARE

## 2024-10-15 VITALS — BODY MASS INDEX: 23.43 KG/M2 | HEIGHT: 72 IN | WEIGHT: 173 LBS

## 2024-10-15 DIAGNOSIS — S32.411D: Primary | ICD-10-CM

## 2024-10-15 DIAGNOSIS — S32.444B: ICD-10-CM

## 2024-10-15 PROCEDURE — 99214 OFFICE O/P EST MOD 30 MIN: CPT | Performed by: SPECIALIST
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
## 2024-10-16 NOTE — TELEPHONE ENCOUNTER
He has an appointment with me next week 10/23/2024 to discuss rescheduling of his anal surgery.  He was unsure whether, or not, the anal lesion was still present.  He needs examination in the office before rescheduling his surgery.

## 2024-10-18 ENCOUNTER — ANTICOAGULATION - WARFARIN VISIT (OUTPATIENT)
Dept: PHARMACY | Facility: HOSPITAL | Age: 84
End: 2024-10-18
Payer: MEDICARE

## 2024-10-18 DIAGNOSIS — I48.19 PERSISTENT ATRIAL FIBRILLATION (MULTI): Primary | ICD-10-CM

## 2024-10-18 DIAGNOSIS — I48.0 PAROXYSMAL ATRIAL FIBRILLATION (MULTI): ICD-10-CM

## 2024-10-18 LAB
POC INR: 2.1 (ref 2–3)
POC PROTHROMBIN TIME: 25.5

## 2024-10-18 PROCEDURE — 85610 PROTHROMBIN TIME: CPT | Mod: QW | Performed by: INTERNAL MEDICINE

## 2024-10-18 PROCEDURE — 99211 OFF/OP EST MAY X REQ PHY/QHP: CPT

## 2024-10-18 NOTE — PATIENT INSTRUCTIONS
Your INR today is in range at 2.1. Please continue your regimen of 7.5mg on Monday and 5 mg all other days. Please let us know your surgery gets rescheduled for. We will follow up in 3 weeks.  If any questions arise do not hesitate to call us at [phone] M-F from 8:30am-4:30pm or at   [phone] after 5pm or on the weekends. Continue to monitor for any excess bleeding or bruising, especially for blood in your stool.
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
## 2024-10-23 ENCOUNTER — APPOINTMENT (OUTPATIENT)
Dept: SURGERY | Facility: CLINIC | Age: 84
End: 2024-10-23
Payer: MEDICARE

## 2024-10-23 VITALS
OXYGEN SATURATION: 99 % | HEART RATE: 62 BPM | HEIGHT: 72 IN | WEIGHT: 170.6 LBS | BODY MASS INDEX: 23.11 KG/M2 | SYSTOLIC BLOOD PRESSURE: 166 MMHG | DIASTOLIC BLOOD PRESSURE: 75 MMHG

## 2024-10-23 DIAGNOSIS — L89.301 PRESSURE INJURY OF BUTTOCK, STAGE 1, UNSPECIFIED LATERALITY: ICD-10-CM

## 2024-10-23 DIAGNOSIS — Z79.01 CHRONIC ANTICOAGULATION: ICD-10-CM

## 2024-10-23 DIAGNOSIS — K62.89 MASS OF ANUS: Primary | ICD-10-CM

## 2024-10-23 PROCEDURE — 1159F MED LIST DOCD IN RCRD: CPT | Performed by: SURGERY

## 2024-10-23 PROCEDURE — 1157F ADVNC CARE PLAN IN RCRD: CPT | Performed by: SURGERY

## 2024-10-23 PROCEDURE — 1160F RVW MEDS BY RX/DR IN RCRD: CPT | Performed by: SURGERY

## 2024-10-23 PROCEDURE — 3078F DIAST BP <80 MM HG: CPT | Performed by: SURGERY

## 2024-10-23 PROCEDURE — G2211 COMPLEX E/M VISIT ADD ON: HCPCS | Performed by: SURGERY

## 2024-10-23 PROCEDURE — 99213 OFFICE O/P EST LOW 20 MIN: CPT | Performed by: SURGERY

## 2024-10-23 PROCEDURE — 3077F SYST BP >= 140 MM HG: CPT | Performed by: SURGERY

## 2024-10-23 PROCEDURE — 1036F TOBACCO NON-USER: CPT | Performed by: SURGERY

## 2024-10-23 PROCEDURE — 1123F ACP DISCUSS/DSCN MKR DOCD: CPT | Performed by: SURGERY

## 2024-10-23 RX ORDER — SILVER SULFADIAZINE 10 G/1000G
CREAM TOPICAL 2 TIMES DAILY
Qty: 50 G | Refills: 3 | Status: SHIPPED | OUTPATIENT
Start: 2024-10-23

## 2024-10-23 ASSESSMENT — ENCOUNTER SYMPTOMS
SHORTNESS OF BREATH: 0
CONSTIPATION: 0
DYSURIA: 0
FREQUENCY: 1
AGITATION: 0
ARTHRALGIAS: 0
EYE REDNESS: 0
NAUSEA: 0
BRUISES/BLEEDS EASILY: 1
FATIGUE: 0
CONFUSION: 0
DIARRHEA: 1
CHILLS: 0
SPEECH DIFFICULTY: 0
MYALGIAS: 1
HEADACHES: 0
POLYPHAGIA: 0
COUGH: 0
WOUND: 1
VOMITING: 0
FEVER: 0
FLANK PAIN: 0
WEAKNESS: 0
EYE PAIN: 0
ABDOMINAL PAIN: 0
HEMATURIA: 0
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
## 2024-10-23 NOTE — PATIENT INSTRUCTIONS
1.  Surgery for removal of your anal lesion is scheduled for Monday, 11/11/2024.  Please, read the patient preoperative instruction sheet BEFORE your surgery.  2.  You will need to stop taking your Coumadin 5 days prior to surgery.  Do NOT take your Coumadin starting 11/6/2024.  3.  Silvadene ointment has been sent to your pharmacy.  Place this on your tailbone area twice a day to help with the skin wound.  Also, avoid sitting as much as possible, as the pressure from sitting is causing this wound.  4.  Follow-up with your orthopedic surgeon as scheduled.

## 2024-10-24 ENCOUNTER — TELEPHONE (OUTPATIENT)
Dept: PREADMISSION TESTING | Facility: HOSPITAL | Age: 84
End: 2024-10-24
Payer: MEDICARE

## 2024-10-29 ENCOUNTER — TELEPHONE (OUTPATIENT)
Dept: SURGERY | Facility: CLINIC | Age: 84
End: 2024-10-29
Payer: MEDICARE

## 2024-10-29 DIAGNOSIS — N18.31 CHRONIC KIDNEY DISEASE, STAGE 3A (MULTI): Primary | ICD-10-CM

## 2024-10-30 ENCOUNTER — LAB (OUTPATIENT)
Dept: LAB | Facility: LAB | Age: 84
End: 2024-10-30
Payer: MEDICARE

## 2024-10-30 DIAGNOSIS — N18.31 CHRONIC KIDNEY DISEASE, STAGE 3A (MULTI): ICD-10-CM

## 2024-10-30 LAB
ALBUMIN SERPL BCP-MCNC: 3.8 G/DL (ref 3.4–5)
ANION GAP SERPL CALC-SCNC: 11 MMOL/L (ref 10–20)
APPEARANCE UR: CLEAR
BILIRUB UR STRIP.AUTO-MCNC: NEGATIVE MG/DL
BUN SERPL-MCNC: 23 MG/DL (ref 6–23)
CALCIUM SERPL-MCNC: 9 MG/DL (ref 8.6–10.3)
CHLORIDE SERPL-SCNC: 103 MMOL/L (ref 98–107)
CO2 SERPL-SCNC: 30 MMOL/L (ref 21–32)
COLOR UR: ABNORMAL
CREAT SERPL-MCNC: 1.19 MG/DL (ref 0.5–1.3)
CREAT UR-MCNC: 62.9 MG/DL (ref 20–370)
EGFRCR SERPLBLD CKD-EPI 2021: 59 ML/MIN/1.73M*2
GLUCOSE SERPL-MCNC: 213 MG/DL (ref 74–99)
GLUCOSE UR STRIP.AUTO-MCNC: ABNORMAL MG/DL
KETONES UR STRIP.AUTO-MCNC: NEGATIVE MG/DL
LEUKOCYTE ESTERASE UR QL STRIP.AUTO: NEGATIVE
MAGNESIUM SERPL-MCNC: 1.71 MG/DL (ref 1.6–2.4)
MICROALBUMIN UR-MCNC: 22.8 MG/L
MICROALBUMIN/CREAT UR: 36.2 UG/MG CREAT
NITRITE UR QL STRIP.AUTO: NEGATIVE
PH UR STRIP.AUTO: 7 [PH]
PHOSPHATE SERPL-MCNC: 3.1 MG/DL (ref 2.5–4.9)
POTASSIUM SERPL-SCNC: 4.9 MMOL/L (ref 3.5–5.3)
PROT UR STRIP.AUTO-MCNC: NEGATIVE MG/DL
PTH-INTACT SERPL-MCNC: 21.2 PG/ML (ref 18.5–88)
RBC # UR STRIP.AUTO: NEGATIVE /UL
SODIUM SERPL-SCNC: 139 MMOL/L (ref 136–145)
SP GR UR STRIP.AUTO: 1.02
UROBILINOGEN UR STRIP.AUTO-MCNC: NORMAL MG/DL

## 2024-10-30 PROCEDURE — 82570 ASSAY OF URINE CREATININE: CPT

## 2024-10-30 PROCEDURE — 80069 RENAL FUNCTION PANEL: CPT

## 2024-10-30 PROCEDURE — 82043 UR ALBUMIN QUANTITATIVE: CPT

## 2024-10-30 PROCEDURE — 83735 ASSAY OF MAGNESIUM: CPT

## 2024-10-30 PROCEDURE — 83970 ASSAY OF PARATHORMONE: CPT

## 2024-10-30 PROCEDURE — 81003 URINALYSIS AUTO W/O SCOPE: CPT

## 2024-10-30 PROCEDURE — 36415 COLL VENOUS BLD VENIPUNCTURE: CPT

## 2024-10-31 ENCOUNTER — APPOINTMENT (OUTPATIENT)
Dept: PHYSICAL THERAPY | Facility: HOSPITAL | Age: 84
End: 2024-10-31
Payer: MEDICARE

## 2024-11-07 ENCOUNTER — ANESTHESIA EVENT (OUTPATIENT)
Dept: OPERATING ROOM | Facility: HOSPITAL | Age: 84
End: 2024-11-07
Payer: MEDICARE

## 2024-11-08 ENCOUNTER — PREP FOR PROCEDURE (OUTPATIENT)
Dept: SURGERY | Facility: HOSPITAL | Age: 84
End: 2024-11-08

## 2024-11-08 ENCOUNTER — APPOINTMENT (OUTPATIENT)
Dept: PHARMACY | Facility: HOSPITAL | Age: 84
End: 2024-11-08
Payer: COMMERCIAL
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
## 2024-11-11 ENCOUNTER — APPOINTMENT (OUTPATIENT)
Dept: CARDIOLOGY | Facility: HOSPITAL | Age: 84
End: 2024-11-11
Payer: MEDICARE

## 2024-11-11 ENCOUNTER — ANESTHESIA (OUTPATIENT)
Dept: OPERATING ROOM | Facility: HOSPITAL | Age: 84
End: 2024-11-11
Payer: MEDICARE

## 2024-11-11 ENCOUNTER — HOSPITAL ENCOUNTER (OUTPATIENT)
Facility: HOSPITAL | Age: 84
Setting detail: OUTPATIENT SURGERY
Discharge: HOME | End: 2024-11-11
Attending: SURGERY | Admitting: SURGERY
Payer: MEDICARE

## 2024-11-11 ENCOUNTER — PHARMACY VISIT (OUTPATIENT)
Dept: PHARMACY | Facility: CLINIC | Age: 84
End: 2024-11-11
Payer: COMMERCIAL

## 2024-11-11 VITALS
OXYGEN SATURATION: 96 % | RESPIRATION RATE: 15 BRPM | DIASTOLIC BLOOD PRESSURE: 70 MMHG | BODY MASS INDEX: 23.06 KG/M2 | WEIGHT: 170 LBS | TEMPERATURE: 97.8 F | SYSTOLIC BLOOD PRESSURE: 166 MMHG | HEART RATE: 65 BPM

## 2024-11-11 DIAGNOSIS — K62.89 MASS OF ANUS: Primary | ICD-10-CM

## 2024-11-11 LAB
ATRIAL RATE: 0 BPM
ERYTHROCYTE [DISTWIDTH] IN BLOOD BY AUTOMATED COUNT: 15.5 % (ref 11.5–14.5)
GLUCOSE BLD MANUAL STRIP-MCNC: 153 MG/DL (ref 74–99)
GLUCOSE BLD MANUAL STRIP-MCNC: 162 MG/DL (ref 74–99)
HCT VFR BLD AUTO: 39.1 % (ref 41–52)
HGB BLD-MCNC: 12.5 G/DL (ref 13.5–17.5)
INR PPP: 1.1 (ref 0.9–1.1)
MCH RBC QN AUTO: 28.2 PG (ref 26–34)
MCHC RBC AUTO-ENTMCNC: 32 G/DL (ref 32–36)
MCV RBC AUTO: 88 FL (ref 80–100)
NRBC BLD-RTO: 0 /100 WBCS (ref 0–0)
PLATELET # BLD AUTO: 254 X10*3/UL (ref 150–450)
PR INTERVAL: 144 MS
PROTHROMBIN TIME: 12.5 SECONDS (ref 9.8–12.8)
Q ONSET: 253 MS
QRS COUNT: 9 BEATS
QRS DURATION: 148 MS
QT INTERVAL: 459 MS
QTC CALCULATION(BAZETT): 439 MS
QTC FREDERICIA: 445 MS
R AXIS: 84 DEGREES
RBC # BLD AUTO: 4.43 X10*6/UL (ref 4.5–5.9)
T AXIS: -34 DEGREES
T OFFSET: 483 MS
VENTRICULAR RATE: 55 BPM
WBC # BLD AUTO: 6.9 X10*3/UL (ref 4.4–11.3)

## 2024-11-11 PROCEDURE — 7100000010 HC PHASE TWO TIME - EACH INCREMENTAL 1 MINUTE: Performed by: SURGERY

## 2024-11-11 PROCEDURE — 3600000003 HC OR TIME - INITIAL BASE CHARGE - PROCEDURE LEVEL THREE: Performed by: SURGERY

## 2024-11-11 PROCEDURE — 36415 COLL VENOUS BLD VENIPUNCTURE: CPT | Performed by: SURGERY

## 2024-11-11 PROCEDURE — 2500000001 HC RX 250 WO HCPCS SELF ADMINISTERED DRUGS (ALT 637 FOR MEDICARE OP): Performed by: ANESTHESIOLOGY

## 2024-11-11 PROCEDURE — 2500000004 HC RX 250 GENERAL PHARMACY W/ HCPCS (ALT 636 FOR OP/ED): Performed by: NURSE ANESTHETIST, CERTIFIED REGISTERED

## 2024-11-11 PROCEDURE — 3600000008 HC OR TIME - EACH INCREMENTAL 1 MINUTE - PROCEDURE LEVEL THREE: Performed by: SURGERY

## 2024-11-11 PROCEDURE — 7100000001 HC RECOVERY ROOM TIME - INITIAL BASE CHARGE: Performed by: SURGERY

## 2024-11-11 PROCEDURE — 2500000004 HC RX 250 GENERAL PHARMACY W/ HCPCS (ALT 636 FOR OP/ED): Performed by: ANESTHESIOLOGY

## 2024-11-11 PROCEDURE — 3700000001 HC GENERAL ANESTHESIA TIME - INITIAL BASE CHARGE: Performed by: SURGERY

## 2024-11-11 PROCEDURE — 7100000002 HC RECOVERY ROOM TIME - EACH INCREMENTAL 1 MINUTE: Performed by: SURGERY

## 2024-11-11 PROCEDURE — 0753T DGTZ GLS MCRSCP SLD LEVEL IV: CPT | Mod: TC,PORLAB | Performed by: SURGERY

## 2024-11-11 PROCEDURE — 82947 ASSAY GLUCOSE BLOOD QUANT: CPT

## 2024-11-11 PROCEDURE — 85027 COMPLETE CBC AUTOMATED: CPT | Performed by: ANESTHESIOLOGY

## 2024-11-11 PROCEDURE — 93005 ELECTROCARDIOGRAM TRACING: CPT

## 2024-11-11 PROCEDURE — 85610 PROTHROMBIN TIME: CPT | Performed by: SURGERY

## 2024-11-11 PROCEDURE — 93010 ELECTROCARDIOGRAM REPORT: CPT | Performed by: INTERNAL MEDICINE

## 2024-11-11 PROCEDURE — RXMED WILLOW AMBULATORY MEDICATION CHARGE

## 2024-11-11 PROCEDURE — 46922 EXCISION OF ANAL LESION(S): CPT | Performed by: SURGERY

## 2024-11-11 PROCEDURE — 2500000005 HC RX 250 GENERAL PHARMACY W/O HCPCS: Performed by: SURGERY

## 2024-11-11 PROCEDURE — 2500000004 HC RX 250 GENERAL PHARMACY W/ HCPCS (ALT 636 FOR OP/ED): Performed by: SURGERY

## 2024-11-11 PROCEDURE — 7100000009 HC PHASE TWO TIME - INITIAL BASE CHARGE: Performed by: SURGERY

## 2024-11-11 PROCEDURE — 2720000007 HC OR 272 NO HCPCS: Performed by: SURGERY

## 2024-11-11 PROCEDURE — 3700000002 HC GENERAL ANESTHESIA TIME - EACH INCREMENTAL 1 MINUTE: Performed by: SURGERY

## 2024-11-11 RX ORDER — GLYCOPYRROLATE 0.2 MG/ML
INJECTION INTRAMUSCULAR; INTRAVENOUS AS NEEDED
Status: DISCONTINUED | OUTPATIENT
Start: 2024-11-11 | End: 2024-11-11

## 2024-11-11 RX ORDER — METOCLOPRAMIDE HYDROCHLORIDE 5 MG/ML
5 INJECTION INTRAMUSCULAR; INTRAVENOUS ONCE
Status: COMPLETED | OUTPATIENT
Start: 2024-11-11 | End: 2024-11-11

## 2024-11-11 RX ORDER — ONDANSETRON HYDROCHLORIDE 2 MG/ML
4 INJECTION, SOLUTION INTRAVENOUS ONCE AS NEEDED
Status: DISCONTINUED | OUTPATIENT
Start: 2024-11-11 | End: 2024-11-11 | Stop reason: HOSPADM

## 2024-11-11 RX ORDER — BUPIVACAINE HCL/EPINEPHRINE 0.5-1:200K
VIAL (ML) INJECTION AS NEEDED
Status: DISCONTINUED | OUTPATIENT
Start: 2024-11-11 | End: 2024-11-11 | Stop reason: HOSPADM

## 2024-11-11 RX ORDER — HYDROCODONE BITARTRATE AND ACETAMINOPHEN 7.5; 325 MG/1; MG/1
1 TABLET ORAL EVERY 6 HOURS PRN
Qty: 10 TABLET | Refills: 0 | Status: SHIPPED | OUTPATIENT
Start: 2024-11-11

## 2024-11-11 RX ORDER — SODIUM CITRATE AND CITRIC ACID MONOHYDRATE 334; 500 MG/5ML; MG/5ML
30 SOLUTION ORAL ONCE
Status: COMPLETED | OUTPATIENT
Start: 2024-11-11 | End: 2024-11-11

## 2024-11-11 RX ORDER — MORPHINE SULFATE 2 MG/ML
2 INJECTION, SOLUTION INTRAMUSCULAR; INTRAVENOUS EVERY 5 MIN PRN
Status: DISCONTINUED | OUTPATIENT
Start: 2024-11-11 | End: 2024-11-11 | Stop reason: HOSPADM

## 2024-11-11 RX ORDER — MORPHINE SULFATE 2 MG/ML
1 INJECTION, SOLUTION INTRAMUSCULAR; INTRAVENOUS EVERY 5 MIN PRN
Status: DISCONTINUED | OUTPATIENT
Start: 2024-11-11 | End: 2024-11-11 | Stop reason: HOSPADM

## 2024-11-11 RX ORDER — LIDOCAINE HCL/PF 100 MG/5ML
SYRINGE (ML) INTRAVENOUS AS NEEDED
Status: DISCONTINUED | OUTPATIENT
Start: 2024-11-11 | End: 2024-11-11

## 2024-11-11 RX ORDER — FENTANYL CITRATE 50 UG/ML
INJECTION, SOLUTION INTRAMUSCULAR; INTRAVENOUS AS NEEDED
Status: DISCONTINUED | OUTPATIENT
Start: 2024-11-11 | End: 2024-11-11

## 2024-11-11 RX ORDER — SODIUM CHLORIDE 9 MG/ML
20 INJECTION, SOLUTION INTRAVENOUS CONTINUOUS
Status: DISCONTINUED | OUTPATIENT
Start: 2024-11-11 | End: 2024-11-11 | Stop reason: HOSPADM

## 2024-11-11 RX ORDER — ACETAMINOPHEN 325 MG/1
975 TABLET ORAL ONCE
Status: DISCONTINUED | OUTPATIENT
Start: 2024-11-11 | End: 2024-11-11 | Stop reason: HOSPADM

## 2024-11-11 RX ORDER — FAMOTIDINE 10 MG/ML
20 INJECTION INTRAVENOUS ONCE
Status: COMPLETED | OUTPATIENT
Start: 2024-11-11 | End: 2024-11-11

## 2024-11-11 RX ORDER — ONDANSETRON HYDROCHLORIDE 2 MG/ML
4 INJECTION, SOLUTION INTRAVENOUS ONCE
Status: COMPLETED | OUTPATIENT
Start: 2024-11-11 | End: 2024-11-11

## 2024-11-11 RX ORDER — ALBUTEROL SULFATE 0.83 MG/ML
2.5 SOLUTION RESPIRATORY (INHALATION) ONCE
Status: DISCONTINUED | OUTPATIENT
Start: 2024-11-11 | End: 2024-11-11 | Stop reason: HOSPADM

## 2024-11-11 RX ORDER — PROPOFOL 10 MG/ML
INJECTION, EMULSION INTRAVENOUS AS NEEDED
Status: DISCONTINUED | OUTPATIENT
Start: 2024-11-11 | End: 2024-11-11

## 2024-11-11 SDOH — HEALTH STABILITY: MENTAL HEALTH: CURRENT SMOKER: 0

## 2024-11-11 ASSESSMENT — PAIN - FUNCTIONAL ASSESSMENT
PAIN_FUNCTIONAL_ASSESSMENT: 0-10
PAIN_FUNCTIONAL_ASSESSMENT: 0-10

## 2024-11-11 ASSESSMENT — PAIN SCALES - GENERAL
PAINLEVEL_OUTOF10: 0 - NO PAIN
PAIN_LEVEL: 0
PAINLEVEL_OUTOF10: 0 - NO PAIN

## 2024-11-11 NOTE — ANESTHESIA PREPROCEDURE EVALUATION
Patient: Jonah Lauren    Procedure Information       Date/Time: 11/11/24 0845    Procedure: Excision of anal lesion - Lithotomy position, 1 hour, 8am    Location: [facility] OR 01 / Virtual [facility] OR    Surgeons: Angelina Aguilar MD            Relevant Problems   Anesthesia (within normal limits)      Cardiac   (+) AIVR (accelerated idioventricular rhythm) (Multi)   (+) Abnormal EKG   (+) Abnormal electrocardiography   (+) Benign hypertension   (+) Essential hypertension   (+) Hyperlipidemia   (+) Mobitz type I incomplete atrioventricular block   (+) Paroxysmal atrial fibrillation (Multi)   (+) Persistent atrial fibrillation (Multi)   (+) Wenckebach      Neuro   (+) Type 2 diabetes mellitus with diabetic polyneuropathy   (+) Type 2 diabetes mellitus with diabetic polyneuropathy, with long-term current use of insulin      GI   (+) Chronic diarrhea   (+) Chronic diarrhea of unknown origin   (+) Irritable bowel syndrome with diarrhea      /Renal   (+) BPH (benign prostatic hyperplasia)   (+) Benign prostatic hyperplasia      Endocrine   (+) Type 2 diabetes mellitus with diabetic polyneuropathy   (+) Type 2 diabetes mellitus with diabetic polyneuropathy, with long-term current use of insulin      Hematology   (+) Anemia   (+) Chronic anticoagulation      HEENT   (+) Hearing loss      ID   (+) Onychomycosis of toenail       Clinical information reviewed:   Tobacco  Allergies  Meds  Problems  Med Hx  Surg Hx   Fam Hx  Soc   Hx        NPO Detail:  NPO/Void Status  Date of Last Liquid: 11/11/24  Time of Last Liquid: 0530  Date of Last Solid: 11/10/24  Time of Last Solid: 2300  Last Intake Type: Clear fluids         Physical Exam    Airway  Mallampati: III  TM distance: >3 FB  Neck ROM: full     Cardiovascular   Rhythm: irregular  Comments: Afib   Dental - normal exam       Pulmonary - normal exam     Abdominal - normal exam             Anesthesia Plan    History of general anesthesia?: yes  History of complications  of general anesthesia?: no    ASA 3     general     The patient is not a current smoker.    intravenous induction   Anesthetic plan and risks discussed with patient.  Use of blood products discussed with patient who consented to blood products.    Plan discussed with CRNA.

## 2024-11-11 NOTE — ANESTHESIA PROCEDURE NOTES
Airway  Date/Time: 11/11/2024 9:10 AM  Urgency: elective      Staffing  Performed: CRNA   Authorized by: DAVID Calderon    Performed by: DAVID Calderon  Patient location during procedure: OR    Indications and Patient Condition  Indications for airway management: anesthesia  Spontaneous Ventilation: absent  Sedation level: deep  Patient position: sniffing  Mask difficulty assessment: 0 - not attempted  Planned trial extubation    Final Airway Details  Final airway type: supraglottic airway      Successful airway: Supraglottic airway: I-gel.  Size 4     Number of attempts at approach: 1    Additional Comments  Atraumatic LMA insertion.

## 2024-11-11 NOTE — ANESTHESIA POSTPROCEDURE EVALUATION
Patient: Jonah Lauren    Procedure Summary       Date: 11/11/24 Room / Location: [facility] OR 01 / Virtual [facility] OR    Anesthesia Start: 0858 Anesthesia Stop: 0942    Procedure: Excision of anal lesion Diagnosis:       Mass of anus      (Mass of anus [K62.89])    Surgeons: Angelina Aguilar MD Responsible Provider: DAVID Calderon    Anesthesia Type: general ASA Status: 3            Anesthesia Type: general    Vitals Value Taken Time   /71 11/11/24 1010   Temp 36.6 °C (97.8 °F) 11/11/24 0940   Pulse 62 11/11/24 1016   Resp 12 11/11/24 1016   SpO2 97 % 11/11/24 1016   Vitals shown include unfiled device data.    Anesthesia Post Evaluation    Patient location during evaluation: PACU  Patient participation: complete - patient participated  Level of consciousness: awake and alert  Pain score: 0  Pain management: adequate  Multimodal analgesia pain management approach  Airway patency: patent  Cardiovascular status: hemodynamically stable  Respiratory status: room air  Hydration status: acceptable  Postoperative Nausea and Vomiting: none    There were no known notable events for this encounter.

## 2024-11-11 NOTE — OP NOTE
Excision of anal lesion Operative Note     Date: 2024  OR Location: [facility]    Name: Jonah Lauren, : 1940, Age: 84 y.o., MRN: [id number], Sex: male    Diagnosis  Pre-op Diagnosis      * Mass of anus [K62.89] Post-op Diagnosis     * Mass of anus [K62.89]     Procedures  Excision of anal lesion      Surgeons      * Angelina Aguilar - Primary    Resident/Fellow/Other Assistant:  Niranjan Ma SA    Staff:   Circulator: Jud  Scrub Person: Brook  Surgical Assistant:   Scrub Person: Niranjan    Anesthesia Staff: CRNA: JANINE Calderon-CRNA    Procedure Summary  Anesthesia: General  ASA: III  Estimated Blood Loss: 2mL  Intra-op Medications:   Administrations occurring from 0845 to 1015 on 24:   Medication Name Total Dose   BUPivacaine-EPINEPHrine (Marcaine w/EPI) 0.5 %-1:200,000 injection 20 mL   sodium chloride 0.9% infusion 128.33 mL   cefOXitin (Mefoxin) 1 g in dextrose 5% 50 mL IV 1 g   fentaNYL (Sublimaze) injection 50 mcg/mL 100 mcg   glycopyrrolate (Robinul) injection 0.2 mg   lidocaine (cardiac) injection 2% prefilled syringe 100 mg   propofol (Diprivan) injection 10 mg/mL 150 mg              Anesthesia Record               Intraprocedure I/O Totals          Intake    sodium chloride 0.9% infusion 200.00 mL    Total Intake 200 mL       Output    Est. Blood Loss 3 mL    Total Output 3 mL       Net    Net Volume 197 mL          Specimen:   ID Type Source Tests Collected by Time   1 : Anal lesion Tissue ANUS BIOPSY SURGICAL PATHOLOGY EXAM Angelina Aguilar MD 2024 0925                 Drains and/or Catheters: * None in log *    Tourniquet Times:         Implants:     Findings: 1 x 1 cm firm nodule along the anterior left anoderm    Indications: Jonah Lauren is an 84 y.o. male who is having surgery for Mass of anus [K62.89].  He had originally presented with anal pain.  On physical exam, he was found to have a firm nodule along the anterior anoderm.  With the  concerns for possible underlying anal cancer, an excision was recommended.    The patient was seen in the preoperative area. The risks, benefits, complications, treatment options, non-operative alternatives, expected recovery and outcomes were discussed with the patient. The possibilities of reaction to medication, pulmonary aspiration, injury to surrounding structures, bleeding, recurrent infection, the need for additional procedures, failure to diagnose a condition, and creating a complication requiring transfusion or operation were discussed with the patient. The patient concurred with the proposed plan, giving informed consent.  The site of surgery was properly noted/marked if necessary per policy. The patient has been actively warmed in preoperative area. Preoperative antibiotics have been ordered and given within 1 hours of incision. Venous thrombosis prophylaxis have been ordered including bilateral sequential compression devices    Procedure Details:   He was brought into the operating room and was laid in the supine position.  After placement under general anesthesia with LMA, he was placed up into the lithotomy position with appropriate padding of all pressure points.  The anal region then was prepped with Hibiclens and draped usual sterile fashion.  A pause was taken the correct patient procedure were identified.    On initial inspection, there was no evidence of any external hemorrhoids.  Sphincter tone appeared intact.  No obvious fistula, fissure or perirectal abscess.  Speculum exam was performed demonstrating grade 1 internal hemorrhoids of 2 columns, but no active bleeding.  No other palpable masses.  Along the left anterior anal Derm, there was a 1 x 1 cm firm nodule.  This was sharply excised using a 15 blade.  The excision went down to, but not including, the underlying sphincter muscle.  The area then was cauterized for hemostasis.  Half percent Marcaine with epinephrine was used to locally  anesthetize the area.  Dibucaine ointment was placed for postoperative pain control.  The wound then was dressed with an ABD pad and maternity briefs.  Patient tolerated the procedure well.  He then was awoken from general anesthesia and taken to the recovery room in stable condition.    Counts: Correct x 2  Complications: None.  Drains: None.    Complications:  None; patient tolerated the procedure well.    Disposition: PACU - hemodynamically stable.  Condition: stable         Task Performed by RNFA or Surgical Assistant:  The surgical assist provided assistance with positioning of the patient, assistance with retraction during surgery.          Additional Details:     Attending Attestation: I performed the procedure.    Angelina Aguilar  Phone Number: [phone]

## 2024-11-11 NOTE — DISCHARGE INSTRUCTIONS
DISCHARGE INSTRUCTIONS    Patient: Jonah Lauren  Surgery Date: 11/11/2024    Age: 84 y.o.   Gender: male  Attending: Angelina Aguilar MD    MRN: [id number]  OR Location: [facility]    PCP: Anna Fernandez MD           SURGERY INFORMATION   Procedure performed: Procedure(s):  Excision of anal lesion   Post-Op diagnosis: Post-op Diagnosis     * Mass of anus [K62.89]   Surgeon:    * Angelina Aguilar - Primary     ACTIVITY RESTRICTIONS   1.  Do not engage in sports, heavy work or lifting until cleared by Dr. Aguilar.    2.  Do not lift/pull/push more than 10 pounds for 2 weeks.   3.  You may drive when off of narcotic pain medication.  4.  Continue wearing the compression stockings (KAROLINE hose) until you are walking at least 3 times per day.    BATHING   You may shower starting the day after your surgery.  You may do a sitz bath with just warm water (no soap, no salt) after bowel movements, or 3-4 times per day, to help with pain.  A sitz bath can be purchased at your local [facility] pharmacy.    WOUND CARE / DRAIN CARE   Use a menstrual pad in your undergarments to collect any bleeding or drainage.  2.   You may apply ice to the surgical site to help with pain.  Heating pad can also be helpful.    MEDICATIONS   A narcotic pain medication has been prescribed.  Use this medication only AS NEEDED for severe pain.  2.   You may use Tylenol, or ibuprofen, in addition to, or instead of, your narcotic pain medication.  3.   Resume all home medications unless previously discussed with your surgeon. Blood thinners can be restarted the day after your surgery unless there is significant bleeding.  4.  A tube of Dibucaine ointment has been provided.  This is numbing medication.  You may apply this directly to the surgical site 3-4 times per day to help with pain.  You may obtain more Dibucaine ointment from your local pharmacy.  This does not require a prescription, but you may need to ask your pharmacist to order  this.    DIET   Diet as tolerated. Stay on a low grease, low-fat diet for 2 weeks.  For the first 24 hours after surgery, it is recommended that you eat light meals.  Some nausea and vomiting is common for the first 24 hours after surgery.  Drink plenty of fluids.  Minimize your use of caffeinated beverages.    CALLL YOUR SURGEON IF:   Any evidence of infection at your sugical site which can include redness or drainage. Some clear or pinkish drainage is normal for a few days following surgery.  2.   Excessive bleeding from your surgical site. If there is a small amount of bleeding, apply pressure for 20 minutes, then recheck the wound. If the bleeding does not stop, please call your surgeon's office.  3.   Some nausea and vomiting is expected for the first 24 hours after surgery. If you are unable to keep down fluids, or the nausea/vomiting continues beyond 24 hours.  4.   If you are unable to urinate within 8-12 hours after discharge from the hospital.  5.   A low-grade fever after surgery is normal. Notify the office if your temperature goes above 101 degrees.  6.   Any other concerns or questions you have regarding your surgery.      Angelina Aguilar MD, FACS   [facility]  [address] [facility];   [address]  [place]  [address] [phone]

## 2024-11-12 ENCOUNTER — TELEPHONE (OUTPATIENT)
Dept: PRIMARY CARE | Facility: CLINIC | Age: 84
End: 2024-11-12
Payer: MEDICARE

## 2024-11-12 DIAGNOSIS — E11.42 TYPE 2 DIABETES MELLITUS WITH DIABETIC POLYNEUROPATHY, UNSPECIFIED WHETHER LONG TERM INSULIN USE: ICD-10-CM

## 2024-11-12 DIAGNOSIS — E11.9 TYPE 2 DIABETES MELLITUS WITHOUT COMPLICATION, WITH LONG-TERM CURRENT USE OF INSULIN (MULTI): ICD-10-CM

## 2024-11-12 DIAGNOSIS — Z79.4 TYPE 2 DIABETES MELLITUS WITHOUT COMPLICATION, WITH LONG-TERM CURRENT USE OF INSULIN (MULTI): ICD-10-CM

## 2024-11-12 RX ORDER — BLOOD SUGAR DIAGNOSTIC
1 STRIP MISCELLANEOUS 4 TIMES DAILY
Qty: 400 STRIP | Refills: 3 | Status: SHIPPED | OUTPATIENT
Start: 2024-11-12 | End: 2025-11-12

## 2024-11-12 RX ORDER — BLOOD-GLUCOSE,RECEIVER,CONT
EACH MISCELLANEOUS
Qty: 1 EACH | Refills: 0 | Status: SHIPPED | OUTPATIENT
Start: 2024-11-12 | End: 2024-11-14 | Stop reason: WASHOUT

## 2024-11-12 RX ORDER — BLOOD-GLUCOSE SENSOR
EACH MISCELLANEOUS
Qty: 3 EACH | Refills: 11 | Status: SHIPPED | OUTPATIENT
Start: 2024-11-12 | End: 2024-11-14 | Stop reason: WASHOUT

## 2024-11-12 NOTE — TELEPHONE ENCOUNTER
Patient inquiring about the Dexcom G7  that was sent to [facility] on 9/25. He was told by pharmacy that insurance is trying to get things straightened out with provider and not sure what they meant. He still hasn't received device.

## 2024-11-12 NOTE — TELEPHONE ENCOUNTER
Rx Refill Request Telephone Encounter    Name:  Jonah Lauren  :  [id number]  Medication Name:  test strips        400  Apply 1 each topically 4 times a day. Patient is to check blood sugar 4 times a day  Specific Pharmacy location:  [place]  Date of last appointment:  2024

## 2024-11-14 ENCOUNTER — TELEPHONE (OUTPATIENT)
Dept: PRIMARY CARE | Facility: CLINIC | Age: 84
End: 2024-11-14
Payer: MEDICARE

## 2024-11-14 DIAGNOSIS — E11.42 TYPE 2 DIABETES MELLITUS WITH DIABETIC POLYNEUROPATHY, WITH LONG-TERM CURRENT USE OF INSULIN: Primary | ICD-10-CM

## 2024-11-14 DIAGNOSIS — Z79.4 TYPE 2 DIABETES MELLITUS WITH DIABETIC POLYNEUROPATHY, WITH LONG-TERM CURRENT USE OF INSULIN: Primary | ICD-10-CM

## 2024-11-14 RX ORDER — HYDROCHLOROTHIAZIDE 12.5 MG/1
1 CAPSULE ORAL SEE ADMIN INSTRUCTIONS
COMMUNITY
End: 2024-11-14 | Stop reason: SDUPTHER

## 2024-11-14 RX ORDER — BLOOD-GLUCOSE,RECEIVER,CONT
1 EACH MISCELLANEOUS AS NEEDED
COMMUNITY
End: 2024-11-14 | Stop reason: SDUPTHER

## 2024-11-14 RX ORDER — HYDROCHLOROTHIAZIDE 12.5 MG/1
1 CAPSULE ORAL SEE ADMIN INSTRUCTIONS
Qty: 2 EACH | Refills: 3 | Status: SHIPPED | OUTPATIENT
Start: 2024-11-14

## 2024-11-14 RX ORDER — BLOOD-GLUCOSE,RECEIVER,CONT
1 EACH MISCELLANEOUS AS NEEDED
Qty: 1 EACH | Refills: 0 | Status: SHIPPED | OUTPATIENT
Start: 2024-11-14

## 2024-11-14 NOTE — TELEPHONE ENCOUNTER
DDM told patient dexcom was not covered under his insurance plan but Freestyle Collin 3 plus is,pended orders

## 2024-11-19 DIAGNOSIS — S32.411D: ICD-10-CM

## 2024-11-19 LAB
LABORATORY COMMENT REPORT: NORMAL
PATH REPORT.FINAL DX SPEC: NORMAL
PATH REPORT.GROSS SPEC: NORMAL
PATH REPORT.RELEVANT HX SPEC: NORMAL
PATH REPORT.TOTAL CANCER: NORMAL

## 2024-11-20 ENCOUNTER — APPOINTMENT (OUTPATIENT)
Dept: PHARMACY | Facility: HOSPITAL | Age: 84
End: 2024-11-20
Payer: MEDICARE

## 2024-11-20 DIAGNOSIS — I48.0 PAROXYSMAL ATRIAL FIBRILLATION (MULTI): ICD-10-CM

## 2024-11-20 DIAGNOSIS — I48.19 PERSISTENT ATRIAL FIBRILLATION (MULTI): Primary | ICD-10-CM

## 2024-11-20 LAB
POC INR: 2.1 (ref 2–3)
POC PROTHROMBIN TIME: 24.6

## 2024-11-20 PROCEDURE — 99211 OFF/OP EST MAY X REQ PHY/QHP: CPT

## 2024-11-20 PROCEDURE — 85610 PROTHROMBIN TIME: CPT | Mod: QW | Performed by: INTERNAL MEDICINE

## 2024-11-20 NOTE — PATIENT INSTRUCTIONS
Your INR today is in range at 2.1. Please continue your regimen of 7.5mg on Monday and 5 mg all other days. We will follow up in 4 weeks.    If any questions arise do not hesitate to call us at [phone] M-F from 8:30am-4:30pm or at   [phone] after 5pm or on the weekends. Continue to monitor for any excess bleeding or bruising, especially for blood in your stool.
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
## 2024-11-26 ENCOUNTER — OFFICE VISIT (OUTPATIENT)
Dept: ORTHOPEDIC SURGERY | Facility: HOSPITAL | Age: 84
End: 2024-11-26
Payer: MEDICARE

## 2024-11-26 ENCOUNTER — HOSPITAL ENCOUNTER (OUTPATIENT)
Dept: RADIOLOGY | Facility: HOSPITAL | Age: 84
Discharge: HOME | End: 2024-11-26
Payer: MEDICARE

## 2024-11-26 VITALS — HEIGHT: 72 IN | BODY MASS INDEX: 23.03 KG/M2 | WEIGHT: 170 LBS

## 2024-11-26 DIAGNOSIS — S32.411D: Primary | ICD-10-CM

## 2024-11-26 DIAGNOSIS — S32.411D: ICD-10-CM

## 2024-11-26 PROCEDURE — 99214 OFFICE O/P EST MOD 30 MIN: CPT | Performed by: SPECIALIST

## 2024-11-26 PROCEDURE — 1157F ADVNC CARE PLAN IN RCRD: CPT | Performed by: SPECIALIST

## 2024-11-26 PROCEDURE — 73502 X-RAY EXAM HIP UNI 2-3 VIEWS: CPT | Mod: RT

## 2024-11-26 PROCEDURE — 1159F MED LIST DOCD IN RCRD: CPT | Performed by: SPECIALIST

## 2024-11-26 PROCEDURE — 1123F ACP DISCUSS/DSCN MKR DOCD: CPT | Performed by: SPECIALIST

## 2024-11-26 NOTE — PROGRESS NOTES
Closed nondisplaced fracture of anterior wall of right acetabulum DOI 9/30/24   XR done today   Patient is doing well    Exam: Patient alert and oriented x 3.  Ambulating well without assistive device.  No acute distress.  Full passive range of motion of right hip with minimal to no pain no restriction and 5 out of 5 strength.  Distal neurovascular intact negative Homans.    Radiographs: Multiple views of pelvis and hip identify no significant abnormalities.    Assessment and plan: Stable nondisplaced healing acetabular fracture.  Patient can advance activities as tolerated.  Follow-up if any new symptoms arise.

## 2024-12-03 NOTE — TELEPHONE ENCOUNTER
[facility] called due to patients request stating they can not bill under Medicare part B and Freestyle not covered ????? Please help

## 2024-12-04 ENCOUNTER — APPOINTMENT (OUTPATIENT)
Dept: SURGERY | Facility: CLINIC | Age: 84
End: 2024-12-04
Payer: MEDICARE

## 2024-12-04 VITALS
BODY MASS INDEX: 23.65 KG/M2 | OXYGEN SATURATION: 95 % | HEIGHT: 72 IN | DIASTOLIC BLOOD PRESSURE: 71 MMHG | WEIGHT: 174.6 LBS | SYSTOLIC BLOOD PRESSURE: 164 MMHG | HEART RATE: 48 BPM

## 2024-12-04 DIAGNOSIS — K62.89 MASS OF ANUS: Primary | ICD-10-CM

## 2024-12-04 DIAGNOSIS — K62.89 ANAL PAIN: ICD-10-CM

## 2024-12-04 PROCEDURE — 1036F TOBACCO NON-USER: CPT | Performed by: SURGERY

## 2024-12-04 PROCEDURE — 3078F DIAST BP <80 MM HG: CPT | Performed by: SURGERY

## 2024-12-04 PROCEDURE — 1123F ACP DISCUSS/DSCN MKR DOCD: CPT | Performed by: SURGERY

## 2024-12-04 PROCEDURE — 99024 POSTOP FOLLOW-UP VISIT: CPT | Performed by: SURGERY

## 2024-12-04 PROCEDURE — 1160F RVW MEDS BY RX/DR IN RCRD: CPT | Performed by: SURGERY

## 2024-12-04 PROCEDURE — 3077F SYST BP >= 140 MM HG: CPT | Performed by: SURGERY

## 2024-12-04 PROCEDURE — 1159F MED LIST DOCD IN RCRD: CPT | Performed by: SURGERY

## 2024-12-04 PROCEDURE — 1157F ADVNC CARE PLAN IN RCRD: CPT | Performed by: SURGERY

## 2024-12-04 ASSESSMENT — ENCOUNTER SYMPTOMS
ABDOMINAL PAIN: 0
SHORTNESS OF BREATH: 0
COUGH: 0
NAUSEA: 0
FATIGUE: 0
EYE PAIN: 0
EYE REDNESS: 0
ARTHRALGIAS: 0
VOMITING: 0
DIARRHEA: 1
FREQUENCY: 1
FLANK PAIN: 0
HEADACHES: 0
FEVER: 0
SPEECH DIFFICULTY: 0
WEAKNESS: 0
MYALGIAS: 1
CHILLS: 0
AGITATION: 0
CONSTIPATION: 0
WOUND: 1
DYSURIA: 0
POLYPHAGIA: 0
BRUISES/BLEEDS EASILY: 1
CONFUSION: 0
HEMATURIA: 0

## 2024-12-04 NOTE — PROGRESS NOTES
GENERAL SURGERY OFFICE NOTE    Patient: Jonah Lauren    Age: 84 y.o.   Gender: male    MRN: [id number]    PCP: Anna Fernandez MD        SUBJECTIVE     Chief Complaint  Follow-up (Patient is here for 3 week post op anal lesion excision done on 11/11/24.  Patient states that he has healed.)       KEISHA Mckenzie returns to the office for 2-week postop check after undergoing an excision of an anal lesion.  At the time of his surgery, the anal lesion actually appeared somewhat smaller than when he was in the office.  This did not extend above the dentate line.  Since surgery, he has only taken Tylenol for his pain.  He usually takes 4 Tylenol a day chronically, and had to temporarily increase his Tylenol to 6 times a day.  He is back down to 4 times a day which is his baseline.  He complains more of hip pain from his fracture, then pain from the anal region.  He only had a few days of a small amount of bloody drainage from the anal region, but has not had any drainage for more than a week now.  He states he is still using the Dibucaine ointment twice a day.  He is eating and drinking well without any nausea or vomiting.  Bowel function is back to baseline.    ROS  Review of Systems   Constitutional:  Negative for chills, fatigue and fever.   HENT:  Negative for congestion, ear pain and hearing loss.    Eyes:  Negative for pain and redness.   Respiratory:  Negative for cough and shortness of breath.    Cardiovascular:  Negative for chest pain and leg swelling.   Gastrointestinal:  Positive for diarrhea. Negative for abdominal pain, constipation, nausea and vomiting.   Endocrine: Negative for polyphagia.   Genitourinary:  Positive for frequency. Negative for dysuria, flank pain and hematuria.   Musculoskeletal:  Positive for myalgias. Negative for arthralgias.   Skin:  Positive for wound. Negative for rash.   Allergic/Immunologic: Negative for immunocompromised state.   Neurological:  Negative for speech  difficulty, weakness and headaches.   Hematological:  Bruises/bleeds easily.   Psychiatric/Behavioral:  Negative for agitation and confusion.           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    Personal history of other diseases of male genital organs 04/10/2019    History of benign prostatic hyperplasia    Personal history of other diseases of the digestive system     History of irritable bowel syndrome    Personal history of other endocrine, nutritional and metabolic disease     History of diabetes mellitus    Personal history of urinary (tract) infections 12/23/2021    History of urinary tract infection    Scrotal rash 06/01/2023    Weight loss, unintentional 06/01/2023        Past Surgical History:   Procedure Laterality Date    ANUS SURGERY N/A 11/11/2024    excision of anal lesion    HERNIA REPAIR Right 07/01/2024    inguinal hernia repair with mesh    PROSTATE SURGERY  10/18/2016    Prostate Surgery    TONSILLECTOMY  10/18/2016    Tonsillectomy        Family History   Problem Relation Name Age of Onset    Bone cancer Mother          Spine    Heart attack Father      Cancer Sister      Other (Mesthelioma) Brother      Diabetes Brother          Allergies   Allergen Reactions    Atorvastatin Diarrhea and Other        Social History     Tobacco Use   Smoking Status Former    Types: Cigarettes   Smokeless Tobacco Never        Social History     Substance and Sexual Activity   Alcohol Use Never        HOME MEDICATIONS  Current Outpatient Medications   Medication Instructions    acetaminophen (TYLENOL) 500 mg, 4 times daily    aMILoride (Midamor) 5 mg tablet 1 tablet, Daily    amLODIPine (Norvasc) 2.5 mg tablet 1 tablet, Daily     "blood sugar diagnostic (Accu-Chek Barbara Plus test strp) strip 1 each, Topical, 4 times daily, Patient is to check blood sugar 4 times a day    blood-glucose sensor (FreeStyle Collin 3 Plus Sensor) device 1 each, miscellaneous, See admin instructions, Change every 15 days    cholecalciferol (Vitamin D-3) 25 MCG (1000 UT) tablet 3 tablets, Daily    cyanocobalamin (Vitamin B-12) 500 mcg tablet 1 tablet, Daily    FreeStyle Collin 3 Henderson misc 1 Device, subcutaneous, As needed, Freestyle Collin 3 Plus reader    insulin lispro protamin-lispro (HumaLOG Mix 75-25) 100 unit/mL (75-25) injection 17 Units, subcutaneous, 2 times daily (morning and late afternoon)    lancets misc To check sugars up to four times daily    loperamide (IMODIUM) 2 mg, 4 times daily PRN    losartan (COZAAR) 100 mg, oral, Daily    magnesium oxide (Mag-Ox) 400 mg (241.3 mg magnesium) tablet 6 tablets, Daily    metFORMIN (GLUCOPHAGE) 500 mg, oral, 4 times daily    multivitamin tablet 1 tablet, Daily    nystatin (Mycostatin) cream 2 times daily    pen needle, diabetic 32 gauge x 5/32\" needle 1 each, 2 times daily    rosuvastatin (CRESTOR) 5 mg, oral, Daily    silver sulfADIAZINE (Silvadene) 1 % cream Topical, 2 times daily    triamcinolone (Kenalog) 0.1 % cream 1 Application, 2 times daily    warfarin (Coumadin) 5 mg tablet TAKE 1-2 TABLETS BY MOUTH ONCE  DAILY AS DIRECTED BY COUMADIN  CLINIC          OBJECTIVE   Last Recorded Vitals.  Blood pressure 164/71, pulse (!) 48, height 1.829 m (6'), weight 79.2 kg (174 lb 9.6 oz), SpO2 95%.     PHYSICAL EXAM  Physical Exam   General: Well-developed, well-nourished and in no acute distress.  Appears younger than stated age.  Head: Normocephalic. Atraumatic.  Neck/thyroid: Neck is supple.   Eyes: Pupils equal round and reactive to light. Conjunctiva normal.  ENMT: No masses or deformity of external nose. External ears without masses.  Respiratory/Chest:  Normal respiratory effort.  Cardiovascular: Regular rate and " rhythm.   Abdomen: Soft, nontender, nondistended.  Moderate diastasis recti extending from the upper epigastric region to several centimeters below the umbilicus.  Laparoscopic incision sites are all healing well without any evidence of infection or hernia.  : Normal external genitalia.  No evidence of hernia of either inguinal area.  Rectal: No evidence of pilonidal cyst.  On inspection, the surgical incision on the anterior anoderm is completely healed.  No active bleeding.  Digital examination showed an intact sphincter tone.  No evidence of internal mass.  No significant internal hemorrhoids.  No active bleeding.  Prostate is minimally enlarged.  Along the lower coccygeal region, the previous decubitus ulcer is well-healed.  No open wound.  No drainage or bleeding.  Musculoskeletal: Joints and limbs are grossly normal. Normal gait. Normal range of motion of major joints.  Neuro: Oriented to person, place and time. No obvious neurological deficit. Motor strength grossly normal.  Psych: Normal mood and affect.    RESULTS   Labs  Surgical Pathology Exam: [id number]  Order: [id number]   Collected 11/11/2024 09:25       Status: Final result       Visible to patient: No ([facility])       Dx: Mass of anus    0 Result Notes       Component  Resulting Agency   FINAL DIAGNOSIS   A. 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  Differential considerations for the inflammatory changes could  include early lichen sclerosus, irritant contact dermatitis, a lichenoid drug reaction, and in the appropriate clinical context, secondary syphilis.  Clinicopathologic correlation is necessary.   Electronically signed by Anna Ray MD on 11/19/2024 at 1124          [facility]  No results found.       ASSESSMENT / PLAN   Diagnoses and all orders for this visit:  Mass of anus  Anal pain            Plan  1.  He has chronic diarrhea issues which she seems to have managed well over the years using a high-fiber diet (eating oatmeal every morning) and taking 2 Imodium tablets every morning.  His last colonoscopy reportedly was less than 3 years ago.  2.  Although he has had some longstanding burning/itching issues of the anal region, in the past these had only been associated when he had the diarrhea issues.  His exam had identified an anal lesion with concerns for possible underlying cancer.  Excision of this area showed benign pathology.  However, there was lichen sclerosis on the specimen which could account for his burning/itching of the perianal region.  He may use over-the-counter steroid/hydrocortisone ointment as needed.  He is currently asymptomatic.  3.  He will continue to follow-up with his orthopedic surgeon regarding his hip fracture.  Nonsurgical management has been recommended.  4.  He has resumed his Coumadin and no evidence of bleeding from the surgical site.  5.  He had developed a grade 1 decubitus ulcer after his hip fracture.  The decubitus ulcer is well-healed.  6.  There is a small amount of irritation in the perianal region which is likely due to his twice daily use of the Dibucaine ointment.  He was encouraged to stop using any ointments in the anal region.  He may clean this area normally with soap and water.  7.  He is referred back to his primary care physician for all further medical care, but may be referred back to my office for any further surgical needs.      Angelina GONZALES  MD Jeff, FACS  [facility]  [address] [place] [address];   [address]  [place]  [address] [phone]

## 2024-12-04 NOTE — PATIENT INSTRUCTIONS
STOP using any ointment or cream in the inguinal area.  If you do have recurrent anal pain, you may use either the Dibucaine or the lidocaine ointment.  You may clean the anal area as normal with soap and water.  Okay to go into a hot tub.  Physical activity as tolerated.  Your cleared to lift as much as tolerated since you are more than 3 months out from your hernia surgery.  However, your activity may be limited by your hip fracture.  4.  If you have any other questions regarding your hernia or inguinal surgery, please contact Dr. Aguilar's office.  [phone]

## 2024-12-05 NOTE — TELEPHONE ENCOUNTER
Pharmacist sounded new and  really was not willing to answer my questions just kept saying a PA is needed and they could not bill part B

## 2024-12-06 DIAGNOSIS — Z79.4 TYPE 2 DIABETES MELLITUS WITH DIABETIC POLYNEUROPATHY, WITH LONG-TERM CURRENT USE OF INSULIN: ICD-10-CM

## 2024-12-06 DIAGNOSIS — E11.42 TYPE 2 DIABETES MELLITUS WITH DIABETIC POLYNEUROPATHY, WITH LONG-TERM CURRENT USE OF INSULIN: ICD-10-CM

## 2024-12-09 RX ORDER — FLASH GLUCOSE SENSOR
KIT MISCELLANEOUS
Qty: 2 EACH | Refills: 12 | Status: SHIPPED | OUTPATIENT
Start: 2024-12-09

## 2024-12-11 NOTE — PROGRESS NOTES
[facility]    Patient Name: Jonah Lauren  Patient : 1940      Scribe Attestation  By signing my name below, IAn Scribe   attest that this documentation has been prepared under the direction and in the presence of Ludin Morgan DO.      Reason for visit:  This is an 84-year-old male here for follow-up regarding his history of persistent atrial fibrillation, anticoagulation with warfarin monitored through the anticoagulation clinic, accelerated idioventricular rhythm, hypertension, dyslipidemia, and orthostasis.     HPI:  This is an 84-year-old male here for follow-up regarding his history of persistent atrial fibrillation, anticoagulation with warfarin monitored through the anticoagulation clinic, accelerated idioventricular rhythm, hypertension, dyslipidemia, and orthostasis.  The patient was last evaluated by me in 2023.  At that visit I ordered an echocardiogram, blood work including CMP/magnesium/CBC to be drawn in 6 months, and asked the patient to follow-up in 6 months and sooner if necessary.  The patient was subsequently seen by the cardiology PA in 2024 at which time a preoperative cardiovascular examination was completed prior to a hernia repair and the patient was asked to follow-up in several months.  Patient underwent a laparoscopic right inguinal hernia repair in 2024.  Patient also underwent excision of an anal lesion in 2024.  BMP done 10/30/2024 showed normal serum sodium and potassium with a serum creatinine of 1.19, serum magnesium was 1.71.  CBC done 2024 showed a hemoglobin of 12.5.  Lipid panel done in 2024 showed an LDL cholesterol of 44 and triglycerides of 133 while on rosuvastatin 5 mg daily.  Echocardiogram done in 2023 showed normal left ventricular systolic function, diastolic dysfunction, biatrial dilatation, mild to moderate tricuspid valve regurgitation, mild to  moderate aortic valve stenosis with a mean aortic valve gradient of 16.8 mmHg and a dimensionless index of 0.37. ECG done today showed atrial fibrillation with a heart rate of 53 bpm. The patient reports that he has been feeling generally well from the cardiac standpoint. He denies any new chest pain, shortness of breath, palpitations and lightheadedness. He states that he notices mild intermittent edema on his legs. He states that he takes all of his medications as prescribed. During my exam, he was resting comfortably on the exam table.          Assessment/Plan:   1. Persistent atrial fibrillation  The patient has a history of persistent atrial fibrillation, on warfarin for thromboembolism prophylaxis.   Warfarin dosing and INR monitoring is being managed by the  anticoagulation clinic through the pharmacy.   He was previously on sotalol which was discontinued secondary to complaints of lightheadedness and fatigue in the setting of slow atrial fibrillation.   ECG done today showed atrial fibrillation with a heart rate of 53 bpm.     He denies chest pain, palpitations or lightheadedness.   Echocardiogram done in December 2023 showed normal left ventricular systolic function, diastolic dysfunction, biatrial dilatation, mild to moderate tricuspid valve regurgitation, mild to moderate aortic valve stenosis with a mean aortic valve gradient of 16.8 mmHg and a dimensionless index of 0.37.   Recent lab works as noted in the HPI.  Lab works as noted below will be done in 1 month prior to his next visit.  Follow up in 6 months and sooner if necessary.      2. Warfarin anticoagulation  The patient is on anticoagulation with warfarin for persistent atrial fibrillation.  Warfarin dosing and INR monitoring is being managed by the  anticoagulation clinic through the pharmacy.   Recent lab works as noted in the HPI.  Lab works as noted below will be done in 1 month prior to his next visit.     3. Accelerated idioventricular  rhythm  The patient was noted to have multiple episodes of accelerated idioventricular rhythm on cardiac monitor.  ECG done today showed atrial fibrillation with a heart rate of 53 bpm.    He denies any anginal chest discomfort or palpitations.  Echocardiogram done in December 2023 showed normal left ventricular systolic function, diastolic dysfunction, biatrial dilatation, mild to moderate tricuspid valve regurgitation, mild to moderate aortic valve stenosis with a mean aortic valve gradient of 16.8 mmHg and a dimensionless index of 0.37.      4. Hypertension  The patient has a history of hypertension which appears controlled on exam today.  He should continue his current antihypertensive medications and monitor his blood pressure at home.      5. Dyslipidemia  Lipid panel done in February 2024 showed an LDL cholesterol of 44 and triglycerides of 133 while on rosuvastatin 5 mg daily.  Lipid panel will be updated in 1 month.  Please see lifestyle recommendations below.     6. Dizziness/lightheadedness  The previously reported dizziness/lightheadedness when quickly changing positions had improved.  ECG done today showed atrial fibrillation with a heart rate of 53 bpm.    Blood pressure appears controlled on exam today.  Echocardiogram as noted above.  Recent lab works as noted in the HPI.  Lab works as noted below will be done in 1 month prior to his next visit.   Patient should follow general recommendations including staying well-hydrated, changing the positions slowly, wearing compression stockings as necessary, and routine exercise.       7. Lower extremity edema  The patient has 1+ pitting bilateral lower extremity edema on exam today.  Recent lab works as noted in the HPI.  Lab works as noted below will be done in 1 month prior to his next visit.   I discussed with him the importance of following a low-sodium heart healthy diet, wearing compression stockings and elevating legs when seated.       Orders:    CMP/lipid/Mg in 1 month   Follow-up in 6 months.    Lifestyle Recommendations  I recommend a whole-food plant-based diet, an eating pattern that encourages the consumption of unrefined plant foods (such as fruits, vegetables, tubers, whole grains, legumes, nuts and seeds) and discourages meats, dairy products, eggs and processed foods.     The AHA/ACC recommends that the patient consume a dietary pattern that emphasizes intake of vegetables, fruits, and whole grains; includes low-fat dairy products, poultry, fish, legumes, non-tropical vegetable oils, and nuts; and limits intake of sodium, sweets, sugar-sweetened beverages, and red meats.  Adapt this dietary pattern to appropriate calorie requirements (a 500-750 kcal/day deficit to loose weight), personal and cultural food preferences, and nutrition therapy for other medical conditions (including diabetes).  Achieve this pattern by following plans such as the [place] Mediterranean, DASH dietary pattern, or AHA diet.     Engage in 2 hours and 30 minutes per week of moderate-intensity physical activity, or 1 hour and 15 minutes (75 minutes) per week of vigorous-intensity aerobic physical activity, or an equivalent combination of moderate and vigorous-intensity aerobic physical activity. Aerobic activity should be performed in episodes of at least 10 minutes preferably spread throughout the week.     Adhering to a heart healthy diet, regular exercise habits, avoidance of tobacco products, and maintenance of a healthy weight are crucial components of their heart disease risk reduction.     Any positive review of systems not specifically addressed in the office visit today should be evaluated and treated by the patients primary care physician or in an emergency department if necessary     Patient was notified that results from ordered tests will be called to the patient if it changes current management; it will otherwise be discussed at a future appointment and available  on [facility].     Thank you for allowing me to participate in the care of this patient.        This document was generated using the assistance of voice recognition software. If there are any errors of spelling, grammar, syntax, or meaning; please feel free to contact me directly for clarification.    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    Past Surgical History:  He has a past surgical history that includes Prostate surgery (10/18/2016); Tonsillectomy (10/18/2016); Hernia repair (Right, 07/01/2024); and Anus surgery (N/A, 11/11/2024).      Social History:  He reports that he has quit smoking. His smoking use included cigarettes. He has never used smokeless tobacco. He reports that he does not drink alcohol and does not use drugs.    Family History:  Family History   Problem Relation Name Age of Onset    Bone cancer Mother          Spine    Heart attack Father      Cancer Sister      Other (Mesthelioma) Brother      Diabetes Brother          Allergies:  Atorvastatin    Outpatient Medications:  Current Outpatient Medications   Medication Instructions    acetaminophen (TYLENOL) 500 mg, 4 times daily    aMILoride (Midamor) 5 mg tablet 1 tablet, Daily    amLODIPine (Norvasc) 2.5 mg tablet 1 tablet, Daily    blood sugar diagnostic (Accu-Chek Barbara Plus test strp) strip 1 each, Topical, 4 times daily, Patient is to check blood sugar 4 times a day    blood-glucose  "sensor (FreeStyle Collin 3 Plus Sensor) device 1 each, miscellaneous, See admin instructions, Change every 15 days    cholecalciferol (Vitamin D-3) 25 MCG (1000 UT) tablet 3 tablets, Daily    cyanocobalamin (Vitamin B-12) 500 mcg tablet 1 tablet, Daily    flash glucose sensor kit (FreeStyle Collin 2 Sensor) kit USE AS DIRECTED to test BLOOD SUGAR    FreeStyle Collin 2 [place] misc Use as instructed    FreeStyle Collin 3 [place] misc 1 Device, subcutaneous, As needed, Freestyle Collin 3 Plus reader    insulin lispro protamin-lispro (HumaLOG Mix 75-25) 100 unit/mL (75-25) injection 17 Units, subcutaneous, 2 times daily (morning and late afternoon)    lancets misc To check sugars up to four times daily    loperamide (IMODIUM) 2 mg, 4 times daily PRN    losartan (COZAAR) 100 mg, oral, Daily    magnesium oxide (Mag-Ox) 400 mg (241.3 mg magnesium) tablet 6 tablets, Daily    metFORMIN (GLUCOPHAGE) 500 mg, oral, 4 times daily    multivitamin tablet 1 tablet, Daily    nystatin (Mycostatin) cream 2 times daily    pen needle, diabetic 32 gauge x 5/32\" needle 1 each, 2 times daily    rosuvastatin (CRESTOR) 5 mg, oral, Daily    silver sulfADIAZINE (Silvadene) 1 % cream Topical, 2 times daily    triamcinolone (Kenalog) 0.1 % cream 1 Application, 2 times daily    warfarin (Coumadin) 5 mg tablet TAKE 1-2 TABLETS BY MOUTH ONCE  DAILY AS DIRECTED BY COUMADIN  CLINIC        ROS:  A 14 point review of systems was done and is negative other than as stated in HPI    Vitals:      11/11/2024     9:50 AM 11/11/2024    10:00 AM 11/11/2024    10:10 AM 11/11/2024    10:20 AM 11/11/2024    10:30 AM 11/26/2024     1:00 PM 12/4/2024     1:42 PM   Vitals   Systolic 134 127 137 144 166  164   Diastolic 57 73 71 77 70  71   Heart Rate 56 59 68 61 65  48   Resp 14 17 14 16 15     Height      1.829 m (6') 1.829 m (6')   Weight (lb)      170 174.6   BMI      23.06 kg/m2 23.68 kg/m2   BSA (m2)      1.98 m2 2.01 m2        Physical Exam:   Constitutional: " Cooperative, in no acute distress, alert, appears stated age.  Skin: Skin color, texture, turgor normal. No rashes or lesions.  Head: Normocephalic. No masses, lesions, tenderness or abnormalities  Eyes: Extraocular movements are grossly intact.  Mouth and throat: Mucous membranes moist  Neck: Neck supple, no carotid bruits, no JVD  Respiratory: Lungs clear to auscultation, no wheezing or rhonchi, no use of accessory muscles  Chest wall: No scars, normal excursion with respiration  Cardiovascular: Irregular, + murmur  Gastrointestinal: Abdomen soft, nontender. Bowel sounds normal.  Musculoskeletal: Strength equal in upper extremities  Extremities: 1+ pitting bilateral lower extremity edema   Neurologic: Sensation grossly intact, alert and oriented x3      Intake/Output:   No intake/output data recorded.    Outpatient Medications  Current Outpatient Medications on File Prior to Visit   Medication Sig Dispense Refill    acetaminophen (Tylenol) 500 mg tablet Take 1 tablet (500 mg) by mouth 4 times a day.      aMILoride (Midamor) 5 mg tablet Take 1 tablet (5 mg) by mouth once daily.      amLODIPine (Norvasc) 2.5 mg tablet Take 1 tablet (2.5 mg) by mouth once daily.      blood sugar diagnostic (Accu-Chek Barbara Plus test strp) strip Apply 1 each topically 4 times a day. Patient is to check blood sugar 4 times a day 400 strip 3    blood-glucose sensor (FreeStyle Collin 3 Plus Sensor) device 1 each see administration instructions. Change every 15 days 2 each 3    cholecalciferol (Vitamin D-3) 25 MCG (1000 UT) tablet Take 3 tablets (75 mcg) by mouth once daily.      cyanocobalamin (Vitamin B-12) 500 mcg tablet Take 1 tablet (500 mcg) by mouth once daily.      flash glucose sensor kit (FreeStyle Collin 2 Sensor) kit USE AS DIRECTED to test BLOOD SUGAR 2 each 12    FreeStyle Collin 2 [place] misc Use as instructed 1 each 0    FreeStyle Collin 3 [place] misc Inject 1 Device under the skin if needed (to check blood sugar signs of hypo  "or hyperglycemia). Freestyle Collin 3 Plus reader 1 each 0    insulin lispro protamin-lispro (HumaLOG Mix 75-25) 100 unit/mL (75-25) injection Inject 17 Units under the skin 2 times a day with meals. 33 mL 3    lancets misc To check sugars up to four times daily 200 each 3    loperamide (Imodium) 2 mg capsule Take 1 capsule (2 mg) by mouth 4 times a day as needed.      losartan (Cozaar) 100 mg tablet Take 1 tablet (100 mg) by mouth once daily. 90 tablet 3    magnesium oxide (Mag-Ox) 400 mg (241.3 mg magnesium) tablet Take 6 tablets (2,400 mg) by mouth once daily.      metFORMIN (Glucophage) 500 mg tablet Take 1 tablet (500 mg) by mouth 4 times a day. 360 tablet 3    multivitamin tablet Take 1 tablet by mouth once daily.      nystatin (Mycostatin) cream twice a day. APPLY A THIN LAYER TO AFFECTED AREA(S) AND RUB IN WELL TWICE DAILY.      pen needle, diabetic 32 gauge x 5/32\" needle 1 each 2 times a day.      rosuvastatin (Crestor) 5 mg tablet TAKE 1 TABLET BY MOUTH DAILY 90 tablet 3    silver sulfADIAZINE (Silvadene) 1 % cream Apply topically 2 times a day. 50 g 3    triamcinolone (Kenalog) 0.1 % cream Apply 1 Application topically 2 times a day.      warfarin (Coumadin) 5 mg tablet TAKE 1-2 TABLETS BY MOUTH ONCE  DAILY AS DIRECTED BY COUMADIN  CLINIC 180 tablet 2    [DISCONTINUED] FreeStyle Collin 2 Sensor kit Use as instructed 1 each 0     No current facility-administered medications on file prior to visit.       Labs: (past 26 weeks)  Recent Results (from the past 26 weeks)   ECG 12 Lead    Collection Time: 06/18/24  2:54 PM   Result Value Ref Range    Ventricular Rate 45 BPM    Atrial Rate 0 BPM    ND Interval 61 ms    QRS Duration 151 ms    QT Interval 463 ms    QTC Calculation(Bazett) 401 ms    R Axis 81 degrees    T Axis -22 degrees    QRS Count 9 beats    Q Onset 252 ms    T Offset 484 ms    QTC Fredericia 420 ms   CBC    Collection Time: 06/18/24  3:18 PM   Result Value Ref Range    WBC 6.2 4.4 - 11.3 x10*3/uL "    nRBC 0.0 0.0 - 0.0 /100 WBCs    RBC 4.42 (L) 4.50 - 5.90 x10*6/uL    Hemoglobin 11.9 (L) 13.5 - 17.5 g/dL    Hematocrit 38.0 (L) 41.0 - 52.0 %    MCV 86 80 - 100 fL    MCH 26.9 26.0 - 34.0 pg    MCHC 31.3 (L) 32.0 - 36.0 g/dL    RDW 15.5 (H) 11.5 - 14.5 %    Platelets 230 150 - 450 x10*3/uL   Basic Metabolic Panel    Collection Time: 06/18/24  3:18 PM   Result Value Ref Range    Glucose 187 (H) 74 - 99 mg/dL    Sodium 137 136 - 145 mmol/L    Potassium 4.3 3.5 - 5.3 mmol/L    Chloride 104 98 - 107 mmol/L    Bicarbonate 26 21 - 32 mmol/L    Anion Gap 11 10 - 20 mmol/L    Urea Nitrogen 28 (H) 6 - 23 mg/dL    Creatinine 1.36 (H) 0.50 - 1.30 mg/dL    eGFR 51 (L) >60 mL/min/1.73m*2    Calcium 8.9 8.6 - 10.3 mg/dL   POCT INR manually resulted    Collection Time: 06/24/24 11:00 AM   Result Value Ref Range    POC INR 2.30 2 - 3    POC Prothrombin Time 27.20    Protime-INR    Collection Time: 07/01/24  7:08 AM   Result Value Ref Range    Protime 12.3 9.8 - 12.8 seconds    INR 1.1 0.9 - 1.1   ECG 12 lead    Collection Time: 07/01/24  7:50 AM   Result Value Ref Range    Ventricular Rate 53 BPM    Atrial Rate 0 BPM    IN Interval 264 ms    QRS Duration 149 ms    QT Interval 487 ms    QTC Calculation(Bazett) 458 ms    R Axis 71 degrees    T Axis -13 degrees    QRS Count 9 beats    Q Onset 249 ms    T Offset 493 ms    QTC Fredericia 467 ms   POCT GLUCOSE    Collection Time: 07/01/24  6:15 PM   Result Value Ref Range    POCT Glucose 277 (H) 74 - 99 mg/dL   POCT GLUCOSE    Collection Time: 07/01/24 10:13 PM   Result Value Ref Range    POCT Glucose 85 74 - 99 mg/dL   Protime-INR    Collection Time: 07/02/24  5:01 AM   Result Value Ref Range    Protime 12.9 (H) 9.8 - 12.8 seconds    INR 1.1 0.9 - 1.1   POCT GLUCOSE    Collection Time: 07/02/24  6:29 AM   Result Value Ref Range    POCT Glucose 106 (H) 74 - 99 mg/dL   POCT INR manually resulted    Collection Time: 07/09/24 11:03 AM   Result Value Ref Range    POC INR 1.60 (A) 2 - 3     POC Prothrombin Time 19.70    POCT INR manually resulted    Collection Time: 07/17/24  8:58 AM   Result Value Ref Range    POC INR 3.50 (A) 2.00 - 3.00    POC Prothrombin Time 42.00 Normal Range: 9.3 - 12.5   POCT INR manually resulted    Collection Time: 08/02/24 12:00 AM   Result Value Ref Range    POC INR 2.80 Normal Range: .9-1.1    POC Prothrombin Time 33.10 Normal Range: 9.3 - 12.5   POCT UA Automated manually resulted    Collection Time: 08/09/24  1:16 PM   Result Value Ref Range    POC Glucose, Urine 500 (3+) (A) NEGATIVE mg/dl    POC Bilirubin, Urine NEGATIVE NEGATIVE    POC Ketones, Urine NEGATIVE NEGATIVE mg/dl    POC Specific Gravity, Urine 1.015 1.005 - 1.035    POC Blood, Urine NEGATIVE NEGATIVE    POC PH, Urine 7.0 No Reference Range Established PH    POC Protein, Urine NEGATIVE NEGATIVE, 30 (1+) mg/dl    POC Urobilinogen, Urine 0.2 0.2, 1.0 EU/DL    Poc Nitrite, Urine NEGATIVE NEGATIVE    POC Leukocytes, Urine NEGATIVE NEGATIVE   POCT INR manually resulted    Collection Time: 08/23/24 11:06 AM   Result Value Ref Range    POC INR 2.80 2.00 - 3.00    POC Prothrombin Time 33.50 Normal Range: 9.3 - 12.5   POCT glycosylated hemoglobin (Hb A1C) manually resulted    Collection Time: 08/26/24  3:47 PM   Result Value Ref Range    POC HEMOGLOBIN A1c 7.0 (A) 4.2 - 6.5 %   POCT INR manually resulted    Collection Time: 09/27/24 11:39 AM   Result Value Ref Range    POC INR 2.30 2.00 - 3.00    POC Prothrombin Time 27.60 Normal Range: 9.3 - 12.5   CBC and Auto Differential    Collection Time: 09/30/24  3:55 PM   Result Value Ref Range    WBC 6.6 4.4 - 11.3 x10*3/uL    nRBC 0.0 0.0 - 0.0 /100 WBCs    RBC 4.40 (L) 4.50 - 5.90 x10*6/uL    Hemoglobin 12.3 (L) 13.5 - 17.5 g/dL    Hematocrit 38.0 (L) 41.0 - 52.0 %    MCV 86 80 - 100 fL    MCH 28.0 26.0 - 34.0 pg    MCHC 32.4 32.0 - 36.0 g/dL    RDW 15.9 (H) 11.5 - 14.5 %    Platelets 199 150 - 450 x10*3/uL    Neutrophils % 74.9 40.0 - 80.0 %    Immature Granulocytes  %, Automated 0.5 0.0 - 0.9 %    Lymphocytes % 12.1 13.0 - 44.0 %    Monocytes % 10.1 2.0 - 10.0 %    Eosinophils % 1.5 0.0 - 6.0 %    Basophils % 0.9 0.0 - 2.0 %    Neutrophils Absolute 4.97 1.60 - 5.50 x10*3/uL    Immature Granulocytes Absolute, Automated 0.03 0.00 - 0.50 x10*3/uL    Lymphocytes Absolute 0.80 0.80 - 3.00 x10*3/uL    Monocytes Absolute 0.67 0.05 - 0.80 x10*3/uL    Eosinophils Absolute 0.10 0.00 - 0.40 x10*3/uL    Basophils Absolute 0.06 0.00 - 0.10 x10*3/uL   Comprehensive metabolic panel    Collection Time: 09/30/24  3:55 PM   Result Value Ref Range    Glucose 121 (H) 74 - 99 mg/dL    Sodium 140 136 - 145 mmol/L    Potassium 4.3 3.5 - 5.3 mmol/L    Chloride 106 98 - 107 mmol/L    Bicarbonate 27 21 - 32 mmol/L    Anion Gap 11 10 - 20 mmol/L    Urea Nitrogen 22 6 - 23 mg/dL    Creatinine 1.05 0.50 - 1.30 mg/dL    eGFR 69 >60 mL/min/1.73m*2    Calcium 8.9 8.6 - 10.3 mg/dL    Albumin 4.2 3.4 - 5.0 g/dL    Alkaline Phosphatase 42 33 - 136 U/L    Total Protein 6.7 6.4 - 8.2 g/dL    AST 23 9 - 39 U/L    Bilirubin, Total 0.5 0.0 - 1.2 mg/dL    ALT 26 10 - 52 U/L   Protime-INR    Collection Time: 09/30/24  3:55 PM   Result Value Ref Range    Protime 19.5 (H) 9.8 - 12.8 seconds    INR 1.7 (H) 0.9 - 1.1   POCT GLUCOSE    Collection Time: 09/30/24  6:06 PM   Result Value Ref Range    POCT Glucose 93 74 - 99 mg/dL   POCT INR manually resulted    Collection Time: 10/18/24 11:42 AM   Result Value Ref Range    POC INR 2.10 2.00 - 3.00    POC Prothrombin Time 25.50 Normal Range: 9.3 - 12.5   Urinalysis with Reflex Microscopic    Collection Time: 10/30/24 11:46 AM   Result Value Ref Range    Color, Urine Light-Yellow Light-Yellow, Yellow, Dark-Yellow    Appearance, Urine Clear Clear    Specific Gravity, Urine 1.016 1.005 - 1.035    pH, Urine 7.0 5.0, 5.5, 6.0, 6.5, 7.0, 7.5, 8.0    Protein, Urine NEGATIVE NEGATIVE, 10 (TRACE), 20 (TRACE) mg/dL    Glucose, Urine 200 (2+) (A) Normal mg/dL    Blood, Urine NEGATIVE  NEGATIVE    Ketones, Urine NEGATIVE NEGATIVE mg/dL    Bilirubin, Urine NEGATIVE NEGATIVE    Urobilinogen, Urine Normal Normal mg/dL    Nitrite, Urine NEGATIVE NEGATIVE    Leukocyte Esterase, Urine NEGATIVE NEGATIVE   Magnesium    Collection Time: 10/30/24 11:46 AM   Result Value Ref Range    Magnesium 1.71 1.60 - 2.40 mg/dL   Renal Function Panel    Collection Time: 10/30/24 11:46 AM   Result Value Ref Range    Glucose 213 (H) 74 - 99 mg/dL    Sodium 139 136 - 145 mmol/L    Potassium 4.9 3.5 - 5.3 mmol/L    Chloride 103 98 - 107 mmol/L    Bicarbonate 30 21 - 32 mmol/L    Anion Gap 11 10 - 20 mmol/L    Urea Nitrogen 23 6 - 23 mg/dL    Creatinine 1.19 0.50 - 1.30 mg/dL    eGFR 59 (L) >60 mL/min/1.73m*2    Calcium 9.0 8.6 - 10.3 mg/dL    Phosphorus 3.1 2.5 - 4.9 mg/dL    Albumin 3.8 3.4 - 5.0 g/dL   Albumin-Creatinine Ratio, Urine Random    Collection Time: 10/30/24 11:46 AM   Result Value Ref Range    Albumin, Urine Random 22.8 Not established mg/L    Creatinine, Urine Random 62.9 20.0 - 370.0 mg/dL    Albumin/Creatinine Ratio 36.2 (H) <30.0 ug/mg Creat   Parathyroid Hormone, Intact    Collection Time: 10/30/24 11:46 AM   Result Value Ref Range    Parathyroid Hormone, Intact 21.2 18.5 - 88.0 pg/mL   ECG 12 lead    Collection Time: 11/11/24  8:15 AM   Result Value Ref Range    Ventricular Rate 55 BPM    Atrial Rate 0 BPM    [place] Interval 144 ms    QRS Duration 148 ms    QT Interval 459 ms    QTC Calculation(Bazett) 439 ms    R Axis 84 degrees    T Axis -34 degrees    QRS Count 9 beats    Q Onset 253 ms    T Offset 483 ms    QTC Fredericia 445 ms   CBC    Collection Time: 11/11/24  8:27 AM   Result Value Ref Range    WBC 6.9 4.4 - 11.3 x10*3/uL    nRBC 0.0 0.0 - 0.0 /100 WBCs    RBC 4.43 (L) 4.50 - 5.90 x10*6/uL    Hemoglobin 12.5 (L) 13.5 - 17.5 g/dL    Hematocrit 39.1 (L) 41.0 - 52.0 %    MCV 88 80 - 100 fL    MCH 28.2 26.0 - 34.0 pg    MCHC 32.0 32.0 - 36.0 g/dL    RDW 15.5 (H) 11.5 - 14.5 %    Platelets 254 150 - 450  x10*3/uL   Protime-INR    Collection Time: 11/11/24  8:27 AM   Result Value Ref Range    Protime 12.5 9.8 - 12.8 seconds    INR 1.1 0.9 - 1.1   POCT GLUCOSE    Collection Time: 11/11/24  8:29 AM   Result Value Ref Range    POCT Glucose 162 (H) 74 - 99 mg/dL   Surgical Pathology Exam    Collection Time: 11/11/24  9:25 AM   Result Value Ref Range    Case Report       Surgical Pathology                                Case: [id number]                                  Authorizing Provider:  Angelina Aguilar MD     Collected:           11/11/2024 0925              Ordering Location:     [facility]  Received:            11/11/2024 1102                                     OR                                                                           Pathologist:           Anna Ray MD                                                          Specimen:    SKIN EXCISION, ANAL LESION                                                                 FINAL DIAGNOSIS       A. SKIN, ANAL LESION, EXCISION:  --POLYPOID PORTION OF SKIN WITH LICHENOID INFLAMMATION, SEE COMMENT  --NO MALIGNANCY IDENTIFIED      COMMENT: Sections show a polypoid portion of skin surmounted by compact orthokeratosis and with a subepidermal band of lymphocytes, plasma cells, focal eosinophils and focal melanophages in association with lichenoid interface changes including lymphocytic exocytosis, scattered necrotic basal keratinocytes and focal superficial dermal sclerosus.    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  Differential considerations for the inflammatory changes could include early lichen sclerosus, irritant contact dermatitis, a lichenoid drug reaction, and in the appropriate clinical context, secondary syphilis.  Clinicopathologic correlation is necessary.              By the signature on this report, the individual or group listed as making the Final Interpretation/Diagnosis  "certifies that they have reviewed this case.       Clinical History       Pre-op diagnosis:  Mass of anus [K62.89]      Gross Description       A:  Received in formalin, labeled with the patient's name and hospital number and \"anal lesion\", is an unoriented, ellipsoid segment of skin with subcutaneous tissue measuring 1.4 x 0.9 x 0.6 cm.  On the skin surface is an elevated, firm area area measuring 1.1 x 0.3 cm which extends within 0.3 cm to both lateral margins. The deep and lateral margins are inked blue. The specimen is sectioned and entirely submitted in 2 cassettes.    MRS     Summary of Cassettes:  Specimen Label Site  A  1 tips  2 body of ellipse     POCT GLUCOSE    Collection Time: 11/11/24  9:44 AM   Result Value Ref Range    POCT Glucose 153 (H) 74 - 99 mg/dL   POCT INR manually resulted    Collection Time: 11/20/24 11:03 AM   Result Value Ref Range    POC INR 2.10 2.00 - 3.00    POC Prothrombin Time 24.60 Normal Range: 9.3 - 12.5       ECG  Encounter Date: 11/11/24   ECG 12 lead   Result Value    Ventricular Rate 55    Atrial Rate 0    AZ Interval 144    QRS Duration 148    QT Interval 459    QTC Calculation(Bazett) 439    R Axis 84    T Axis -34    QRS Count 9    Q Onset 253    T Offset 483    QTC Fredericia 445    Narrative    Atrial fibrillation  Right bundle branch block    Confirmed by Ludin Morgan ([id number]) on 11/11/2024 10:54:47 AM       Echocardiogram  No results found for this or any previous visit from the past 1095 days.      CV Studies:  EKG:  Encounter Date: 11/11/24   ECG 12 lead   Result Value    Ventricular Rate 55    Atrial Rate 0    AZ Interval 144    QRS Duration 148    QT Interval 459    QTC Calculation(Bazett) 439    R Axis 84    T Axis -34    QRS Count 9    Q Onset 253    T Offset 483    QTC Fredericia 445    Narrative    Atrial fibrillation  Right bundle branch block    Confirmed by Ludin Morgan ([id number]) on 11/11/2024 10:54:47 AM     Echocardiogram:   Echocardiogram     Narrative  [place] " [address] [address]  Phone [phone] Fax [phone]    TRANSTHORACIC ECHOCARDIOGRAM REPORT      Patient Name:     JINA YOUNG Reading Physician:   [id number] Ludin Cathy HDEZ  Study Date:       5/28/2021         Referring Physician: [id number] WANG  MRN/PID:          [id number]          PCP:  Accession/Order#: [id number]      Department Location: [facility]  YOB: 1940         Fellow:  Gender:           M                 Nurse:  Admit Date:                         Sonographer:         [facility]  Admission Status: Outpatient        Additional Staff:  Height:           182.88 cm         CC Report to:  Weight:           80.74 kg          Study Type:          Echocardiogram  BSA:              2.03 m2  Blood Pressure: 168 /90 mmHg    Diagnosis/ICD: I48.0-Paroxysmal atrial fibrillation  Indication:    A-Fib  Procedure/CPT: Echo Complete w Full Doppler-[id number]  Study Detail: The following Echo studies were performed: 2D, M-Mode, Doppler and  color flow.      PHYSICIAN INTERPRETATION:  Left Ventricle: The left ventricular systolic function is normal, with an estimated ejection fraction of 55-60%. There are no regional wall motion abnormalities. The left ventricular cavity size is normal. There is mild concentric left ventricular hypertrophy. Left ventricular diastolic filling was indeterminate.  Left Atrium: The left atrium is severely dilated.  Right Ventricle: The right ventricle is normal in size. There is normal right ventricular global systolic function.  Right Atrium: The right atrium is mildly dilated.  Aortic Valve: The aortic valve is trileaflet. There is no evidence of aortic valve regurgitation. The mean gradient of the aortic valve is 8.0 mmHg.  Mitral Valve: The mitral valve is normal in structure. There is mild to moderate mitral valve regurgitation.  Tricuspid Valve: The tricuspid valve is structurally normal. There is trace to  mild tricuspid regurgitation.  Pulmonic Valve: The pulmonic valve is structurally normal. There is physiologic pulmonic valve regurgitation.  Pericardium: There is no pericardial effusion noted.  Aorta: The aortic root is normal.      CONCLUSIONS:  1. The left ventricular systolic function is normal with a 55-60% estimated ejection fraction.  2. The left atrium is severely dilated.  3. Mild to moderate mitral valve regurgitation.    QUANTITATIVE DATA SUMMARY:  2D MEASUREMENTS:  Normal Ranges:  IVSd:          1.20 cm    (0.6-1.1cm)  LVPWd:         1.20 cm    (0.6-1.1cm)  LVIDd:         4.60 cm    (3.9-5.9cm)  LVIDs:         2.80 cm  LV Mass Index: 101.1 g/m2  LV % FS        39.1 %    LA VOLUME:  Normal Ranges:  LA Vol A4C:        100.1 ml   (22+/-6mL/m2)  LA Vol A2C:        123.8 ml  LA Vol BP:         113.0 ml  LA Vol Index A4C:  49.4ml/m2  LA Vol Index A2C:  61.0 ml/m2  LA Vol Index BP:   55.7 ml/m2  LA Area A4C:       27.4 cm2  LA Area A2C:       31.0 cm2  LA Major Axis A4C: 6.4 cm  LA Major Axis A2C: 6.6 cm  LA Volume Index:   55.0 ml/m2  LA Vol A4C:        97.0 ml  LA Vol A2C:        117.0 ml    RA VOLUME BY A/L METHOD:  Normal Ranges:  RA Area A4C: 16.0 cm2    M-MODE MEASUREMENTS:  Normal Ranges:  Ao Root: 3.40 cm (2.0-3.7cm)  AoV Exc: 1.60 cm (1.5-2.5cm)  LAs:     4.30 cm (2.7-4.0cm)    AORTA MEASUREMENTS:  Normal Ranges:  AoV Exc:     1.60 cm (1.5-2.5cm)  Ao Sinus, d: 3.20 cm (2.1-3.5cm)  Ao STJ, d:   2.80 cm (1.7-3.4cm)  Asc Ao, d:   3.20 cm (2.1-3.4cm)    LV SYSTOLIC FUNCTION BY 2D PLANIMETRY (MOD):  Normal Ranges:  EF-A4C View: 58.2 % (>55%)  EF-A2C View: 60.6 %  EF-Biplane:  59.3 %    LV DIASTOLIC FUNCTION:  Normal Ranges:  MV Peak E:    1.38 m/s    (0.7-1.2 m/s)  MV Peak A:    0.26 m/s    (0.42-0.7 m/s)  E/A Ratio:    5.21        (1.0-2.2)  MV e'         0.12 m/s    (>8.0)  MV lateral e' 0.12 m/s  MV medial e'  0.11 m/s  MV A Dur:     111.00 msec  E/e' Ratio:   12.00       (<8.0)  LV IVRT:      53 msec      (<100ms)    MITRAL VALVE:  Normal Ranges:  MV DT: 211 msec (150-240msec)    MITRAL INSUFFICIENCY:  Normal Ranges:  PISA Radius:  0.5 cm  MR VTI:       182.00 cm  MR Vmax:      544.00 cm/s  MR Alias Andrea: 30.8 cm/s  MR Volume:    16.19 ml  MR Flow Rt:   48.38 ml/s  MR EROA:      0.09 cm2    AORTIC VALVE:  Normal Ranges:  AoV Mean P.0 mmHg (1.7-11.5mmHg)  LVOT Max Andrea:            0.95 m/s (<1.1m/s)  AoV VTI:                 40.60 cm (18-25cm)  LVOT VTI:                20.60 cm  LVOT Diameter:           2.00 cm  (1.8-2.4cm)  AoV Area, VTI:           1.59 cm2 (2.5-5.5cm2)  AoV Dimensionless Index: 0.51    RIGHT VENTRICLE:  RV 1   3.6 cm  RV 2   2.0 cm  RV 3   8.1 cm  TAPSE: 22.0 mm  RV s'  0.17 m/s    TRICUSPID VALVE/RVSP:  Normal Ranges:  Peak TR Velocity: 2.80 m/s  RV Syst Pressure: 34.4 mmHg (< 30mmHg)  TV E Vmax:        0.62 m/s  (0.3-0.7m/s)  TV A Vmax:        0.44 m/s  IVC Diam:         2.10 cm      [id number] Ludin Morgan DO  Electronically signed on 2021 at 3:05:49 PM         Final     Stress Testing IMESULT([id number]:1:182): No results found for this or any previous visit from the past  days.    Cardiac Catheterization: No results found for this or any previous visit from the past  days.  No results found for this or any previous visit from the past 3650 days.     Cardiac Scoring: No results found for this or any previous visit from the past  days.    AAA : No results found for this or any previous visit from the past  days.    OTHER: No results found for this or any previous visit from the past  days.    LAST IMAGING RESULTS  XR hip right with pelvis when performed 2 or 3 views  Narrative: Interpreted By:  Russel Whitfield,   STUDY:  XR HIP RIGHT WITH PELVIS WHEN PERFORMED 2 OR 3 VIEWS; ;  2024  12:30 pm      INDICATION:  Signs/Symptoms:rt hip pain.      ,S32.411D Displaced fracture of anterior wall of right acetabulum,  subsequent encounter for fracture with routine  healing      COMPARISON:  None.      ACCESSION NUMBER(S):  [id number]      ORDERING CLINICIAN:  CHANDAN BYRNE      FINDINGS:  Right hip, five views      Subacute nondisplaced fracture through the right acetabular anterior  wall again seen. Subacute healing fracture through the right inferior  pubic ramus. There is no acute fracture dislocation. There is mild  degenerative changes in the hips bilaterally.      Impression: Subacute nondisplaced fracture through the right acetabulum and  through the right inferior pubic ramus.          MACRO:  None      Signed by: Russel Whitfield 11/27/2024 7:30 PM  Dictation workstation:   [id number]      Problem List Items Addressed This Visit       AIVR (accelerated idioventricular rhythm) (Multi)    Hyperlipidemia    Orthostatic dizziness    Persistent atrial fibrillation (Multi) - Primary    Warfarin anticoagulation    Essential hypertension    Lightheadedness          Ludin Morgan DO, FACC, FACOI

## 2024-12-13 ENCOUNTER — CLINICAL SUPPORT (OUTPATIENT)
Dept: PRIMARY CARE | Facility: CLINIC | Age: 84
End: 2024-12-13
Payer: MEDICARE

## 2024-12-13 NOTE — PROGRESS NOTES
Subjective   Patient ID: Jonah Lauren is a 84 y.o. male who presents for Diabetes (Sensor change and set up on L arm ).    HPI     Review of Systems    Objective   There were no vitals taken for this visit.    Physical Exam    Assessment/Plan

## 2024-12-16 ENCOUNTER — ANTICOAGULATION - WARFARIN VISIT (OUTPATIENT)
Dept: PHARMACY | Facility: HOSPITAL | Age: 84
End: 2024-12-16
Payer: MEDICARE

## 2024-12-16 ENCOUNTER — APPOINTMENT (OUTPATIENT)
Dept: CARDIOLOGY | Facility: CLINIC | Age: 84
End: 2024-12-16
Payer: MEDICARE

## 2024-12-16 VITALS
HEART RATE: 53 BPM | HEIGHT: 72 IN | SYSTOLIC BLOOD PRESSURE: 126 MMHG | WEIGHT: 179.2 LBS | BODY MASS INDEX: 24.27 KG/M2 | DIASTOLIC BLOOD PRESSURE: 70 MMHG

## 2024-12-16 DIAGNOSIS — I44.2 AIVR (ACCELERATED IDIOVENTRICULAR RHYTHM) (MULTI): ICD-10-CM

## 2024-12-16 DIAGNOSIS — I48.19 PERSISTENT ATRIAL FIBRILLATION (MULTI): ICD-10-CM

## 2024-12-16 DIAGNOSIS — I10 BENIGN HYPERTENSION: ICD-10-CM

## 2024-12-16 DIAGNOSIS — R42 ORTHOSTATIC DIZZINESS: ICD-10-CM

## 2024-12-16 DIAGNOSIS — I48.19 PERSISTENT ATRIAL FIBRILLATION (MULTI): Primary | ICD-10-CM

## 2024-12-16 DIAGNOSIS — R42 LIGHTHEADEDNESS: ICD-10-CM

## 2024-12-16 DIAGNOSIS — I48.0 PAROXYSMAL ATRIAL FIBRILLATION (MULTI): Primary | ICD-10-CM

## 2024-12-16 DIAGNOSIS — I10 ESSENTIAL HYPERTENSION: ICD-10-CM

## 2024-12-16 DIAGNOSIS — E78.00 PURE HYPERCHOLESTEROLEMIA: ICD-10-CM

## 2024-12-16 DIAGNOSIS — Z79.01 WARFARIN ANTICOAGULATION: ICD-10-CM

## 2024-12-16 LAB
POC INR: 2.7 (ref 2–3)
POC PROTHROMBIN TIME: 32.6
Q ONSET: 225 MS
QRS COUNT: 9 BEATS
QRS DURATION: 140 MS
QT INTERVAL: 482 MS
QTC CALCULATION(BAZETT): 452 MS
QTC FREDERICIA: 462 MS
R AXIS: 127 DEGREES
T AXIS: 6 DEGREES
T OFFSET: 466 MS
VENTRICULAR RATE: 53 BPM

## 2024-12-16 PROCEDURE — 99214 OFFICE O/P EST MOD 30 MIN: CPT | Performed by: INTERNAL MEDICINE

## 2024-12-16 PROCEDURE — 3078F DIAST BP <80 MM HG: CPT | Performed by: INTERNAL MEDICINE

## 2024-12-16 PROCEDURE — 99214 OFFICE O/P EST MOD 30 MIN: CPT | Mod: 25 | Performed by: INTERNAL MEDICINE

## 2024-12-16 PROCEDURE — 93010 ELECTROCARDIOGRAM REPORT: CPT | Performed by: INTERNAL MEDICINE

## 2024-12-16 PROCEDURE — 1123F ACP DISCUSS/DSCN MKR DOCD: CPT | Performed by: INTERNAL MEDICINE

## 2024-12-16 PROCEDURE — 1159F MED LIST DOCD IN RCRD: CPT | Performed by: INTERNAL MEDICINE

## 2024-12-16 PROCEDURE — 1036F TOBACCO NON-USER: CPT | Performed by: INTERNAL MEDICINE

## 2024-12-16 PROCEDURE — 1157F ADVNC CARE PLAN IN RCRD: CPT | Performed by: INTERNAL MEDICINE

## 2024-12-16 PROCEDURE — 3074F SYST BP LT 130 MM HG: CPT | Performed by: INTERNAL MEDICINE

## 2024-12-16 PROCEDURE — 85610 PROTHROMBIN TIME: CPT | Mod: QW

## 2024-12-16 PROCEDURE — 93005 ELECTROCARDIOGRAM TRACING: CPT | Performed by: INTERNAL MEDICINE

## 2024-12-16 NOTE — PROGRESS NOTES
Mr. Lauren is a referral from Dr. Morgan for warfarin management of Afib (goal 2-3). He denies any changes in health. Patient enjoys broccoli. He denies any missed or extra doses. No signs or symptoms of bleeding reported. He has been taking 6 tylenol tablets a day for the pain from the surgery. He normally takes 4 tablets a day.  No changes in his diet. For his calf pain he said he still takes the [employer]'s brand Leg Cramp PM, which is a quinine derivative product and it was discussed that this may increase the risk of bleeding with warfarin. We discussed if he starts taking frequently to let the clinic know. He is back to his normal exercises 5 times a week. He has started lifting weights too. His INR is in range at 2.7 today, He will continue his warfarin of 7.5mg on Monday and 5mg all other days. If the next INR is still in range, we can likely push his appointments out to their normal schedule of 6-8 weeks. We will follow up in 4 weeks.

## 2024-12-16 NOTE — PATIENT INSTRUCTIONS
Your INR today is in range at 2.7. Please continue your regimen of 7.5mg on Monday and 5 mg all other days. We will follow up in 4 weeks.  If any questions arise do not hesitate to call us at [phone] M-F from 8:30am-4:30pm or at   [phone] after 5pm or on the weekends. Continue to monitor for any excess bleeding or bruising, especially for blood in your stool.

## 2024-12-17 ENCOUNTER — TELEPHONE (OUTPATIENT)
Dept: SURGERY | Facility: CLINIC | Age: 84
End: 2024-12-17
Payer: MEDICARE

## 2024-12-17 NOTE — TELEPHONE ENCOUNTER
Patient stating that he is burning and itching in the rectum 12/04/24. Patient stated that the lidocaine ointment was helping for a long time but, now it appears to be in the inside rather than on the outside of the rectum. Patient would like to know if there is another alternative or something else that could be prescribed to help with his issue. Patient would like call back to discuss. Please advise.

## 2024-12-27 ENCOUNTER — APPOINTMENT (OUTPATIENT)
Dept: PRIMARY CARE | Facility: CLINIC | Age: 84
End: 2024-12-27
Payer: MEDICARE

## 2024-12-27 VITALS — RESPIRATION RATE: 16 BRPM | HEART RATE: 70 BPM | OXYGEN SATURATION: 98 %

## 2025-01-09 ENCOUNTER — APPOINTMENT (OUTPATIENT)
Dept: PRIMARY CARE | Facility: CLINIC | Age: 85
End: 2025-01-09
Payer: MEDICARE

## 2025-01-09 NOTE — PROGRESS NOTES
Subjective   Patient ID: Jonah Lauren is a 84 y.o. male who presents for Diabetes (Patient here to verify sensor change ).    HPI     Review of Systems    Objective   There were no vitals taken for this visit.    Physical Exam    Assessment/Plan

## 2025-01-10 DIAGNOSIS — Z79.4 TYPE 2 DIABETES MELLITUS WITH DIABETIC POLYNEUROPATHY, WITH LONG-TERM CURRENT USE OF INSULIN: ICD-10-CM

## 2025-01-10 DIAGNOSIS — E11.42 TYPE 2 DIABETES MELLITUS WITH DIABETIC POLYNEUROPATHY, WITH LONG-TERM CURRENT USE OF INSULIN: ICD-10-CM

## 2025-01-10 RX ORDER — INSULIN LISPRO 100 [IU]/ML
17 INJECTION, SUSPENSION SUBCUTANEOUS
Qty: 33 ML | Refills: 0 | Status: SHIPPED | OUTPATIENT
Start: 2025-01-10 | End: 2025-04-17

## 2025-01-13 ENCOUNTER — LAB (OUTPATIENT)
Dept: LAB | Facility: LAB | Age: 85
End: 2025-01-13
Payer: MEDICARE

## 2025-01-13 ENCOUNTER — ANTICOAGULATION - WARFARIN VISIT (OUTPATIENT)
Dept: PHARMACY | Facility: HOSPITAL | Age: 85
End: 2025-01-13
Payer: MEDICARE

## 2025-01-13 DIAGNOSIS — R42 LIGHTHEADEDNESS: ICD-10-CM

## 2025-01-13 DIAGNOSIS — I10 BENIGN HYPERTENSION: ICD-10-CM

## 2025-01-13 DIAGNOSIS — I48.19 PERSISTENT ATRIAL FIBRILLATION (MULTI): ICD-10-CM

## 2025-01-13 DIAGNOSIS — I10 ESSENTIAL HYPERTENSION: ICD-10-CM

## 2025-01-13 DIAGNOSIS — R42 ORTHOSTATIC DIZZINESS: ICD-10-CM

## 2025-01-13 DIAGNOSIS — E78.00 PURE HYPERCHOLESTEROLEMIA: ICD-10-CM

## 2025-01-13 DIAGNOSIS — Z79.01 WARFARIN ANTICOAGULATION: ICD-10-CM

## 2025-01-13 DIAGNOSIS — I44.2 AIVR (ACCELERATED IDIOVENTRICULAR RHYTHM) (MULTI): ICD-10-CM

## 2025-01-13 DIAGNOSIS — I48.0 PAROXYSMAL ATRIAL FIBRILLATION (MULTI): Primary | ICD-10-CM

## 2025-01-13 LAB
ALBUMIN SERPL BCP-MCNC: 4 G/DL (ref 3.4–5)
ALP SERPL-CCNC: 56 U/L (ref 33–136)
ALT SERPL W P-5'-P-CCNC: 22 U/L (ref 10–52)
ANION GAP SERPL CALC-SCNC: 14 MMOL/L (ref 10–20)
AST SERPL W P-5'-P-CCNC: 23 U/L (ref 9–39)
BILIRUB SERPL-MCNC: 0.5 MG/DL (ref 0–1.2)
BUN SERPL-MCNC: 21 MG/DL (ref 6–23)
CALCIUM SERPL-MCNC: 8.7 MG/DL (ref 8.6–10.3)
CHLORIDE SERPL-SCNC: 101 MMOL/L (ref 98–107)
CHOLEST SERPL-MCNC: 120 MG/DL (ref 0–199)
CHOLESTEROL/HDL RATIO: 2.1
CO2 SERPL-SCNC: 28 MMOL/L (ref 21–32)
CREAT SERPL-MCNC: 1.09 MG/DL (ref 0.5–1.3)
EGFRCR SERPLBLD CKD-EPI 2021: 66 ML/MIN/1.73M*2
GLUCOSE SERPL-MCNC: 263 MG/DL (ref 74–99)
HDLC SERPL-MCNC: 56.2 MG/DL
LDLC SERPL CALC-MCNC: 19 MG/DL
MAGNESIUM SERPL-MCNC: 1.64 MG/DL (ref 1.6–2.4)
NON HDL CHOLESTEROL: 64 MG/DL (ref 0–149)
POC INR: 2.3 (ref 2–3)
POC PROTHROMBIN TIME: 27.4
POTASSIUM SERPL-SCNC: 4.6 MMOL/L (ref 3.5–5.3)
PROT SERPL-MCNC: 5.8 G/DL (ref 6.4–8.2)
SODIUM SERPL-SCNC: 138 MMOL/L (ref 136–145)
TRIGL SERPL-MCNC: 223 MG/DL (ref 0–149)
VLDL: 45 MG/DL (ref 0–40)

## 2025-01-13 PROCEDURE — 80061 LIPID PANEL: CPT

## 2025-01-13 PROCEDURE — 80053 COMPREHEN METABOLIC PANEL: CPT

## 2025-01-13 PROCEDURE — 83735 ASSAY OF MAGNESIUM: CPT

## 2025-01-13 PROCEDURE — 85610 PROTHROMBIN TIME: CPT | Mod: QW

## 2025-01-13 PROCEDURE — 99211 OFF/OP EST MAY X REQ PHY/QHP: CPT

## 2025-01-13 NOTE — PROGRESS NOTES
Mr. Lauren is a referral from Dr. Morgan for warfarin management of Afib (goal 2-3). He denies any changes in health. Patient enjoys broccoli. He denies any missed or extra doses. No signs or symptoms of bleeding reported. He normally takes 4 tablets of acetaminophen a day.  No changes in his diet. He is back to his normal exercises 5 times a week. He has started lifting weights too. His INR is in range at 2.3 today, we will continue his warfarin of 7.5mg on Monday and 5mg all other days. We will follow up in 6-8 weeks.

## 2025-01-13 NOTE — PATIENT INSTRUCTIONS
Your INR today is in range at 2.3. Please continue your regimen of 7.5mg on Monday and 5 mg all other days. We will follow up in 6-8 weeks.  If any questions arise do not hesitate to call us at [phone] M-F from 8:30am-4:30pm or at   [phone] after 5pm or on the weekends. Continue to monitor for any excess bleeding or bruising, especially for blood in your stool.

## 2025-01-17 ENCOUNTER — TELEPHONE (OUTPATIENT)
Dept: PRIMARY CARE | Facility: CLINIC | Age: 85
End: 2025-01-17
Payer: MEDICARE

## 2025-01-17 NOTE — TELEPHONE ENCOUNTER
Received prescription for humalog quick pen but was short 1 box of 5.please send rx to [facility]

## 2025-01-17 NOTE — TELEPHONE ENCOUNTER
Prescribed some amount not sure what's going on will call pharmacy   Patient received an 89 day supply due to no refills being sent gave a verbal to 90 day supply  with refill and [facility] will send extra box   Patient notified

## 2025-01-22 ENCOUNTER — APPOINTMENT (OUTPATIENT)
Dept: PRIMARY CARE | Facility: CLINIC | Age: 85
End: 2025-01-22
Payer: MEDICARE

## 2025-01-22 VITALS — OXYGEN SATURATION: 97 % | HEART RATE: 72 BPM

## 2025-01-30 ENCOUNTER — TELEPHONE (OUTPATIENT)
Dept: PRIMARY CARE | Facility: CLINIC | Age: 85
End: 2025-01-30
Payer: MEDICARE

## 2025-01-30 NOTE — TELEPHONE ENCOUNTER
Patient called and stated he has had about 30-40 point difference from sensor to fingerstick blood sugars and has been in the higher 100's to lowe 200's at times also some lows in early morning hours DHAVAL

## 2025-02-04 ENCOUNTER — APPOINTMENT (OUTPATIENT)
Dept: PRIMARY CARE | Facility: CLINIC | Age: 85
End: 2025-02-04
Payer: MEDICARE

## 2025-02-10 ENCOUNTER — APPOINTMENT (OUTPATIENT)
Dept: PRIMARY CARE | Facility: CLINIC | Age: 85
End: 2025-02-10
Payer: MEDICARE

## 2025-02-10 ENCOUNTER — TELEPHONE (OUTPATIENT)
Dept: PRIMARY CARE | Facility: CLINIC | Age: 85
End: 2025-02-10

## 2025-02-10 NOTE — TELEPHONE ENCOUNTER
PA  for Freestyle ibre 2 densor requested , submitted under M not sure what's going on but will call    Pharmacy called patient will have a copay of 52 dollars monthly as [facility] does not cover any CGM and Medicare only covers 80 % patient responsiblitly 20% patient will not pay which he has expressed already. No CGM's covered under plan patient will have to fingerstick

## 2025-02-14 ENCOUNTER — TELEPHONE (OUTPATIENT)
Dept: UROLOGY | Facility: CLINIC | Age: 85
End: 2025-02-14
Payer: MEDICARE

## 2025-02-14 DIAGNOSIS — N39.0 URINARY TRACT INFECTION WITHOUT HEMATURIA, SITE UNSPECIFIED: Primary | ICD-10-CM

## 2025-02-14 RX ORDER — CEPHALEXIN 500 MG/1
500 CAPSULE ORAL 2 TIMES DAILY
Qty: 14 CAPSULE | Refills: 2 | Status: SHIPPED | OUTPATIENT
Start: 2025-02-14 | End: 2025-07-11

## 2025-02-26 ENCOUNTER — APPOINTMENT (OUTPATIENT)
Dept: PRIMARY CARE | Facility: CLINIC | Age: 85
End: 2025-02-26
Payer: MEDICARE

## 2025-02-26 VITALS
HEIGHT: 72 IN | OXYGEN SATURATION: 98 % | RESPIRATION RATE: 16 BRPM | DIASTOLIC BLOOD PRESSURE: 72 MMHG | WEIGHT: 177 LBS | SYSTOLIC BLOOD PRESSURE: 124 MMHG | BODY MASS INDEX: 23.98 KG/M2 | HEART RATE: 86 BPM

## 2025-02-26 DIAGNOSIS — E11.42 TYPE 2 DIABETES MELLITUS WITH DIABETIC POLYNEUROPATHY, WITHOUT LONG-TERM CURRENT USE OF INSULIN: ICD-10-CM

## 2025-02-26 DIAGNOSIS — I10 BENIGN HYPERTENSION: ICD-10-CM

## 2025-02-26 DIAGNOSIS — R35.1 BENIGN PROSTATIC HYPERPLASIA WITH NOCTURIA: ICD-10-CM

## 2025-02-26 DIAGNOSIS — I48.0 PAROXYSMAL ATRIAL FIBRILLATION (MULTI): ICD-10-CM

## 2025-02-26 DIAGNOSIS — Z79.4 TYPE 2 DIABETES MELLITUS WITHOUT COMPLICATION, WITH LONG-TERM CURRENT USE OF INSULIN (MULTI): ICD-10-CM

## 2025-02-26 DIAGNOSIS — N40.1 BENIGN PROSTATIC HYPERPLASIA WITH NOCTURIA: ICD-10-CM

## 2025-02-26 DIAGNOSIS — E11.9 TYPE 2 DIABETES MELLITUS WITHOUT COMPLICATION, WITH LONG-TERM CURRENT USE OF INSULIN (MULTI): ICD-10-CM

## 2025-02-26 DIAGNOSIS — Z00.00 ROUTINE GENERAL MEDICAL EXAMINATION AT HEALTH CARE FACILITY: Primary | ICD-10-CM

## 2025-02-26 DIAGNOSIS — E78.2 MIXED HYPERLIPIDEMIA: ICD-10-CM

## 2025-02-26 DIAGNOSIS — N18.31 STAGE 3A CHRONIC KIDNEY DISEASE (MULTI): ICD-10-CM

## 2025-02-26 PROBLEM — Z86.39 HISTORY OF DIABETES MELLITUS: Status: RESOLVED | Noted: 2024-02-26 | Resolved: 2025-02-26

## 2025-02-26 LAB — POC HEMOGLOBIN A1C: 7.5 % (ref 4.2–6.5)

## 2025-02-26 PROCEDURE — 3074F SYST BP LT 130 MM HG: CPT | Performed by: FAMILY MEDICINE

## 2025-02-26 PROCEDURE — 1157F ADVNC CARE PLAN IN RCRD: CPT | Performed by: FAMILY MEDICINE

## 2025-02-26 PROCEDURE — 1123F ACP DISCUSS/DSCN MKR DOCD: CPT | Performed by: FAMILY MEDICINE

## 2025-02-26 PROCEDURE — 3078F DIAST BP <80 MM HG: CPT | Performed by: FAMILY MEDICINE

## 2025-02-26 PROCEDURE — 1159F MED LIST DOCD IN RCRD: CPT | Performed by: FAMILY MEDICINE

## 2025-02-26 PROCEDURE — 99214 OFFICE O/P EST MOD 30 MIN: CPT | Performed by: FAMILY MEDICINE

## 2025-02-26 PROCEDURE — 1036F TOBACCO NON-USER: CPT | Performed by: FAMILY MEDICINE

## 2025-02-26 PROCEDURE — 1158F ADVNC CARE PLAN TLK DOCD: CPT | Performed by: FAMILY MEDICINE

## 2025-02-26 PROCEDURE — 83036 HEMOGLOBIN GLYCOSYLATED A1C: CPT | Performed by: FAMILY MEDICINE

## 2025-02-26 RX ORDER — METFORMIN HYDROCHLORIDE 500 MG/1
500 TABLET ORAL 3 TIMES DAILY
Qty: 270 TABLET | Refills: 3 | Status: SHIPPED | OUTPATIENT
Start: 2025-02-26 | End: 2026-02-26

## 2025-02-26 RX ORDER — LANOLIN ALCOHOL/MO/W.PET/CERES
5 CREAM (GRAM) TOPICAL DAILY
Status: SHIPPED | COMMUNITY
Start: 2025-02-26

## 2025-02-26 RX ORDER — LOSARTAN POTASSIUM 100 MG/1
100 TABLET ORAL DAILY
Qty: 90 TABLET | Refills: 3 | Status: SHIPPED | OUTPATIENT
Start: 2025-02-26

## 2025-02-26 RX ORDER — LIDOCAINE 50 MG/G
OINTMENT TOPICAL
COMMUNITY
Start: 2024-12-12

## 2025-02-26 NOTE — ASSESSMENT & PLAN NOTE
Rosuvastatin, stable and controlled Dr. Morgan manages  Orders:    CBC and Auto Differential; Future    TSH with reflex to Free T4 if abnormal; Future

## 2025-02-26 NOTE — PROGRESS NOTES
Subjective   Reason for Visit: Jonah Lauren is an 84 y.o. male here for a Medicare Wellness visit.     Past Medical, Surgical, and Family History reviewed and updated in chart.    Reviewed all medications by prescribing practitioner or clinical pharmacist (such as prescriptions, OTCs, herbal therapies and supplements) and documented in the medical record.    HPI  Jonah is here for follow up, some increased stressors, and that affects his BS- A1C was 7.9 Weight up a few pounds, and he is not diapppointed  Patient Care Team:  Anna Fernandez MD as PCP - General (Family Medicine)  Anna Fernandez MD as PCP - [facility] Attributed Provider   Dr. Cathy Daniels  Review of Systems   All other systems reviewed and are negative.      Objective   Vitals:  /72   Pulse 86   Resp 16   Ht 1.829 m (6')   Wt 80.3 kg (177 lb)   SpO2 98%   BMI 24.01 kg/m²       Physical Exam  Vitals reviewed.   Constitutional:       Appearance: Normal appearance. He is normal weight.   HENT:      Head: Normocephalic and atraumatic.      Right Ear: Tympanic membrane normal.      Left Ear: Tympanic membrane normal.      Nose: Nose normal.      Mouth/Throat:      Mouth: Mucous membranes are moist.      Pharynx: Oropharynx is clear.   Eyes:      Extraocular Movements: Extraocular movements intact.      Conjunctiva/sclera: Conjunctivae normal.      Pupils: Pupils are equal, round, and reactive to light.   Cardiovascular:      Rate and Rhythm: Normal rate and regular rhythm.      Pulses: Normal pulses.      Heart sounds: Murmur heard.   Pulmonary:      Effort: Pulmonary effort is normal.      Breath sounds: Normal breath sounds.   Abdominal:      General: Abdomen is flat. Bowel sounds are normal.      Palpations: Abdomen is soft.   Musculoskeletal:         General: Normal range of motion.      Cervical back: Normal range of motion and neck supple.   Skin:     General: Skin is warm and dry.      Capillary Refill:  Capillary refill takes less than 2 seconds.   Neurological:      General: No focal deficit present.      Mental Status: He is alert and oriented to person, place, and time.   Psychiatric:         Mood and Affect: Mood normal.         Behavior: Behavior normal.       Exercise 45 monutes 5 days a week including weight lifting,   Assessment & Plan  Benign hypertension  Amlodipine amiloride  Orders:    losartan (Cozaar) 100 mg tablet; Take 1 tablet (100 mg) by mouth once daily.    CBC and Auto Differential; Future    Comprehensive Metabolic Panel; Future    Lipid Panel; Future    TSH with reflex to Free T4 if abnormal; Future    Follow Up In [facility] - PCP - Established; Future    Type 2 diabetes mellitus without complication, with long-term current use of insulin (Multi)  Metformin insulin 75/25, A1c was 7.8, blood sugars running between 90 and 110 for the most part  Orders:    POCT glycosylated hemoglobin (Hb A1C) manually resulted    metFORMIN (Glucophage) 500 mg tablet; Take 1 tablet (500 mg) by mouth 3 times a day.    Hemoglobin A1C; Future    Albumin-Creatinine Ratio, Urine Random; Future    Follow Up In [facility] - PCP - Established; Future    Routine general medical examination at health care facility    Orders:    1 Year Follow Up In [facility] - PCP - Wellness Exam; Future    Mixed hyperlipidemia  Rosuvastatin, stable and controlled Dr. Morgan manages  Orders:    CBC and Auto Differential; Future    TSH with reflex to Free T4 if abnormal; Future    Benign prostatic hyperplasia with nocturia  Stable and controlled  Orders:    Prostate Specific Antigen; Future    Paroxysmal atrial fibrillation (Multi)  Currently in regular rhythm  Warfarin       Stage 3a chronic kidney disease (Multi)  Sees Dr. Steele twice a year, stable and controlled

## 2025-02-26 NOTE — ASSESSMENT & PLAN NOTE
Amlodipine amiloride  Orders:    losartan (Cozaar) 100 mg tablet; Take 1 tablet (100 mg) by mouth once daily.    CBC and Auto Differential; Future    Comprehensive Metabolic Panel; Future    Lipid Panel; Future    TSH with reflex to Free T4 if abnormal; Future    Follow Up In [facility] - PCP - Established; Future

## 2025-03-03 ENCOUNTER — APPOINTMENT (OUTPATIENT)
Dept: PHARMACY | Facility: HOSPITAL | Age: 85
End: 2025-03-03
Payer: MEDICARE

## 2025-03-03 DIAGNOSIS — I48.0 PAROXYSMAL ATRIAL FIBRILLATION (MULTI): Primary | ICD-10-CM

## 2025-03-03 DIAGNOSIS — I48.19 PERSISTENT ATRIAL FIBRILLATION (MULTI): ICD-10-CM

## 2025-03-03 LAB
POC INR: 2.2 (ref 2–3)
POC PROTHROMBIN TIME: 26.8

## 2025-03-03 PROCEDURE — 99211 OFF/OP EST MAY X REQ PHY/QHP: CPT

## 2025-03-03 PROCEDURE — 85610 PROTHROMBIN TIME: CPT | Mod: QW | Performed by: INTERNAL MEDICINE

## 2025-03-03 NOTE — PROGRESS NOTES
Mr. Lauren is a referral from Dr. Morgan for warfarin management of Afib (goal 2-3). He denies any changes in health. Patient enjoys broccoli. He denies any missed or extra doses. No signs or symptoms of bleeding reported. He normally takes 4 tablets of acetaminophen a day.  No changes in his diet. He is back to his normal exercises 5 times a week. He has started lifting weights too. His INR is in range at 2.2 today, we will continue his warfarin of 7.5mg on Monday and 5mg all other days. We will follow up in 6-8 weeks.

## 2025-03-03 NOTE — PATIENT INSTRUCTIONS
Your INR today is in range at 2.2. Please continue your regimen of 7.5mg on Monday and 5 mg all other days. We will follow up in 6-8 weeks.  If any questions arise do not hesitate to call us at [phone] M-F from 8:30am-4:30pm or at   [phone] after 5pm or on the weekends. Continue to monitor for any excess bleeding or bruising, especially for blood in your stool.

## 2025-03-12 LAB
ALBUMIN SERPL-MCNC: 4.4 G/DL (ref 3.6–5.1)
ALBUMIN/CREAT UR: 141 MG/G CREAT
ALP SERPL-CCNC: 47 U/L (ref 35–144)
ALT SERPL-CCNC: 14 U/L (ref 9–46)
ANION GAP SERPL CALCULATED.4IONS-SCNC: 9 MMOL/L (CALC) (ref 7–17)
AST SERPL-CCNC: 19 U/L (ref 10–35)
BASOPHILS # BLD AUTO: 62 CELLS/UL (ref 0–200)
BASOPHILS NFR BLD AUTO: 0.8 %
BILIRUB SERPL-MCNC: 0.6 MG/DL (ref 0.2–1.2)
BUN SERPL-MCNC: 24 MG/DL (ref 7–25)
CALCIUM SERPL-MCNC: 9.2 MG/DL (ref 8.6–10.3)
CHLORIDE SERPL-SCNC: 101 MMOL/L (ref 98–110)
CHOLEST SERPL-MCNC: 139 MG/DL
CHOLEST/HDLC SERPL: 2.1 (CALC)
CO2 SERPL-SCNC: 27 MMOL/L (ref 20–32)
CREAT SERPL-MCNC: 1.12 MG/DL (ref 0.7–1.22)
CREAT UR-MCNC: 51 MG/DL (ref 20–320)
EGFRCR SERPLBLD CKD-EPI 2021: 64 ML/MIN/1.73M2
EOSINOPHIL # BLD AUTO: 193 CELLS/UL (ref 15–500)
EOSINOPHIL NFR BLD AUTO: 2.5 %
ERYTHROCYTE [DISTWIDTH] IN BLOOD BY AUTOMATED COUNT: 13 % (ref 11–15)
EST. AVERAGE GLUCOSE BLD GHB EST-MCNC: 177 MG/DL
EST. AVERAGE GLUCOSE BLD GHB EST-SCNC: 9.8 MMOL/L
GLUCOSE SERPL-MCNC: 151 MG/DL (ref 65–99)
HBA1C MFR BLD: 7.8 % OF TOTAL HGB
HCT VFR BLD AUTO: 36.9 % (ref 38.5–50)
HDLC SERPL-MCNC: 66 MG/DL
HGB BLD-MCNC: 11.7 G/DL (ref 13.2–17.1)
LDLC SERPL CALC-MCNC: 54 MG/DL (CALC)
LYMPHOCYTES # BLD AUTO: 1448 CELLS/UL (ref 850–3900)
LYMPHOCYTES NFR BLD AUTO: 18.8 %
MCH RBC QN AUTO: 28 PG (ref 27–33)
MCHC RBC AUTO-ENTMCNC: 31.7 G/DL (ref 32–36)
MCV RBC AUTO: 88.3 FL (ref 80–100)
MICROALBUMIN UR-MCNC: 7.2 MG/DL
MONOCYTES # BLD AUTO: 724 CELLS/UL (ref 200–950)
MONOCYTES NFR BLD AUTO: 9.4 %
NEUTROPHILS # BLD AUTO: 5275 CELLS/UL (ref 1500–7800)
NEUTROPHILS NFR BLD AUTO: 68.5 %
NONHDLC SERPL-MCNC: 73 MG/DL (CALC)
PLATELET # BLD AUTO: 251 THOUSAND/UL (ref 140–400)
PMV BLD REES-ECKER: 11 FL (ref 7.5–12.5)
POTASSIUM SERPL-SCNC: 4.6 MMOL/L (ref 3.5–5.3)
PROT SERPL-MCNC: 6.8 G/DL (ref 6.1–8.1)
PSA SERPL-MCNC: 6.16 NG/ML
RBC # BLD AUTO: 4.18 MILLION/UL (ref 4.2–5.8)
SODIUM SERPL-SCNC: 137 MMOL/L (ref 135–146)
TRIGL SERPL-MCNC: 111 MG/DL
TSH SERPL-ACNC: 0.98 MIU/L (ref 0.4–4.5)
WBC # BLD AUTO: 7.7 THOUSAND/UL (ref 3.8–10.8)

## 2025-03-13 DIAGNOSIS — E11.42 TYPE 2 DIABETES MELLITUS WITH DIABETIC POLYNEUROPATHY, WITH LONG-TERM CURRENT USE OF INSULIN: ICD-10-CM

## 2025-03-13 DIAGNOSIS — R97.20 ELEVATED PSA: Primary | ICD-10-CM

## 2025-03-13 DIAGNOSIS — Z79.4 TYPE 2 DIABETES MELLITUS WITH DIABETIC POLYNEUROPATHY, WITH LONG-TERM CURRENT USE OF INSULIN: ICD-10-CM

## 2025-03-14 ENCOUNTER — TELEPHONE (OUTPATIENT)
Dept: PRIMARY CARE | Facility: CLINIC | Age: 85
End: 2025-03-14
Payer: MEDICARE

## 2025-03-14 NOTE — TELEPHONE ENCOUNTER
----- Message from Anna Fernandez sent at 3/13/2025 11:35 PM EDT -----  Jonah, lab work is about the same.  PSA still elevated you are still slightly anemic and A1c is at about 8. Not very different. I would recheck your PSA and A1C in six months. I ordered.

## 2025-04-07 ENCOUNTER — TELEPHONE (OUTPATIENT)
Dept: PRIMARY CARE | Facility: CLINIC | Age: 85
End: 2025-04-07
Payer: MEDICARE

## 2025-04-07 DIAGNOSIS — I10 BENIGN HYPERTENSION: ICD-10-CM

## 2025-04-07 RX ORDER — LOSARTAN POTASSIUM 100 MG/1
100 TABLET ORAL DAILY
Qty: 90 TABLET | Refills: 0 | Status: SHIPPED | OUTPATIENT
Start: 2025-04-07

## 2025-04-08 ENCOUNTER — TELEPHONE (OUTPATIENT)
Dept: CARDIOLOGY | Facility: HOSPITAL | Age: 85
End: 2025-04-08
Payer: MEDICARE

## 2025-04-08 DIAGNOSIS — E78.5 HYPERLIPIDEMIA, UNSPECIFIED HYPERLIPIDEMIA TYPE: ICD-10-CM

## 2025-04-08 RX ORDER — ROSUVASTATIN CALCIUM 5 MG/1
5 TABLET, COATED ORAL DAILY
Qty: 90 TABLET | Refills: 1 | Status: SHIPPED | OUTPATIENT
Start: 2025-04-08

## 2025-04-08 NOTE — TELEPHONE ENCOUNTER
Patient called to advise that the prescription for his rosuvastatin was OK to leave with [facility] at this time but would prefer future refills be sent to [facility].

## 2025-04-28 ENCOUNTER — ANTICOAGULATION - WARFARIN VISIT (OUTPATIENT)
Dept: PHARMACY | Facility: HOSPITAL | Age: 85
End: 2025-04-28
Payer: MEDICARE

## 2025-04-28 DIAGNOSIS — I48.19 PERSISTENT ATRIAL FIBRILLATION (MULTI): ICD-10-CM

## 2025-04-28 DIAGNOSIS — I48.0 PAROXYSMAL ATRIAL FIBRILLATION (MULTI): Primary | ICD-10-CM

## 2025-04-28 LAB
POC INR: 2 (ref 2–3)
POC PROTHROMBIN TIME: 24.2

## 2025-04-28 PROCEDURE — 85610 PROTHROMBIN TIME: CPT | Mod: QW | Performed by: INTERNAL MEDICINE

## 2025-04-28 PROCEDURE — 99211 OFF/OP EST MAY X REQ PHY/QHP: CPT

## 2025-04-28 NOTE — PROGRESS NOTES
Mr. Lauren is a referral from Dr. Morgan for warfarin management of Afib (goal 2-3). He denies any changes in health. Patient enjoys broccoli. He denies any missed or extra doses. No signs or symptoms of bleeding reported. He normally takes 4 tablets of acetaminophen a day.  No changes in his diet. He is back to his normal exercises 5 times a week. He has started lifting weights too. His INR is in range at 2.0 today, we will continue his warfarin of 7.5mg on Monday and 5mg all other days. He has been stable on this regimen. We will follow up in 8 weeks.

## 2025-04-28 NOTE — PATIENT INSTRUCTIONS
Your INR today is in range at 2.0. Please continue your regimen of 7.5mg on Monday and 5 mg all other days. We will follow up in 8 weeks.    If any questions arise do not hesitate to call us at [phone] M-F from 8:30am-4:30pm or at   [phone] after 5pm or on the weekends. Continue to monitor for any excess bleeding or bruising, especially for blood in your stool.

## 2025-04-29 DIAGNOSIS — I48.19 PERSISTENT ATRIAL FIBRILLATION (MULTI): ICD-10-CM

## 2025-04-29 RX ORDER — WARFARIN SODIUM 5 MG/1
TABLET ORAL
Qty: 96 TABLET | Refills: 0 | Status: SHIPPED | OUTPATIENT
Start: 2025-04-29

## 2025-05-05 DIAGNOSIS — Z79.4 TYPE 2 DIABETES MELLITUS WITHOUT COMPLICATION, WITH LONG-TERM CURRENT USE OF INSULIN: ICD-10-CM

## 2025-05-05 DIAGNOSIS — E11.9 TYPE 2 DIABETES MELLITUS WITHOUT COMPLICATION, WITH LONG-TERM CURRENT USE OF INSULIN: ICD-10-CM

## 2025-05-05 RX ORDER — BLOOD SUGAR DIAGNOSTIC
1 STRIP MISCELLANEOUS 3 TIMES DAILY
Qty: 300 STRIP | Refills: 3 | Status: SHIPPED | OUTPATIENT
Start: 2025-05-05 | End: 2026-05-05

## 2025-06-16 PROBLEM — I35.0 NONRHEUMATIC AORTIC (VALVE) STENOSIS: Status: ACTIVE | Noted: 2025-06-16

## 2025-06-16 PROBLEM — R60.0 BILATERAL LOWER EXTREMITY EDEMA: Status: ACTIVE | Noted: 2025-06-16

## 2025-06-16 NOTE — PROGRESS NOTES
[facility]    Patient Name: Jonah Lauren  Patient : 1940    Scribe Attestation  By signing my name below, An RODRIGUEZ, Dhirajibmarcia attest that this documentation has been prepared under the direction and in the presence of Ludin Morgan DO.    Physician Attestation  Ludin RODRIGUEZ DO, personally performed the services described in the documentation as scribed by An Gomez in my presence, and confirm it is both accurate and complete.    Reason for visit:  This is an 84-year-old male here for follow-up regarding persistent atrial fibrillation, anticoagulation with warfarin followed by the anticoagulation clinic, moderate aortic valve stenosis, accelerated idioventricular rhythm, hypertension, dyslipidemia, dizziness/orthostasis, and lower extremity edema.     HPI:  This is an 84-year-old male here for follow-up regarding persistent atrial fibrillation, anticoagulation with warfarin followed by the anticoagulation clinic, moderate aortic valve stenosis, accelerated idioventricular rhythm, hypertension, dyslipidemia, dizziness/orthostasis, and lower extremity edema.  The patient was last evaluated by me in 2024.  At that visit I ordered blood work including CMP/lipid/magnesium to be drawn in 1 month and asked the patient to follow-up in 6 months and sooner if necessary.  CMP done in 2025 showed normal serum sodium and potassium with a serum creatinine of 1.12, normal ALT/AST, hemoglobin A1c was 7.8%, TSH was 0.98, CBC showed a hemoglobin of 11.7.  Lipid panel done 3/11/2025 showed an LDL cholesterol of 54 and triglycerides of 111 while on rosuvastatin 5 mg daily. ECG done today showed atrial fibrillation with a heart rate of 52 bpm.  The patient reports tiredness and feels that his legs get tired easily, although he any new chest pain, shortness of breath, palpitations and lightheadedness. He states that he snores at night and declined  sleep study several times in the past. He states that he takes all of his medications as prescribed. During my exam, he was resting comfortably on the exam table.            Assessment/Plan:   1. Persistent atrial fibrillation  The patient has a history of persistent atrial fibrillation, on warfarin for thromboembolism prophylaxis.   Warfarin dosing and INR monitoring is being managed by the  anticoagulation clinic through the pharmacy.   He was previously on sotalol which was discontinued secondary to complaints of lightheadedness and fatigue in the setting of slow atrial fibrillation.   ECG done today showed atrial fibrillation with a heart rate of 52 bpm.    He denies chest pain, palpitations or lightheadedness.   Echocardiogram done in December 2023 showed normal left ventricular systolic function, diastolic dysfunction, biatrial dilatation, mild to moderate tricuspid valve regurgitation, mild to moderate aortic valve stenosis with a mean aortic valve gradient of 16.8 mmHg and a dimensionless index of 0.37.   Recent lab works as noted in the HPI.  Echocardiogram was ordered as noted below.   Lab works as noted below will be done in 6 months prior to his next visit.   Follow up in 6 months and sooner if necessary.      2. Warfarin anticoagulation  The patient is on anticoagulation with warfarin for persistent atrial fibrillation.  Warfarin dosing and INR monitoring is being managed by the  anticoagulation clinic through the pharmacy.   Recent lab works as noted in the HPI.  Lab works as noted below will be done in 6 months prior to his next visit.     3. Aortic valve stenosis   The patient has a history of moderate aortic valve stenosis.  Echocardiogram done in December 2023 showed normal left ventricular systolic function, diastolic dysfunction, biatrial dilatation, mild to moderate tricuspid valve regurgitation, mild to moderate aortic valve stenosis with a mean aortic valve gradient of 16.8 mmHg and a  dimensionless index of 0.37.   Echocardiogram was ordered as noted below.     4. Accelerated idioventricular rhythm  The patient was noted to have multiple episodes of accelerated idioventricular rhythm on cardiac monitor.  ECG done today showed atrial fibrillation with a heart rate of 52 bpm.    He denies any anginal chest discomfort or palpitations.  Echocardiogram done in December 2023 showed normal left ventricular systolic function, diastolic dysfunction, biatrial dilatation, mild to moderate tricuspid valve regurgitation, mild to moderate aortic valve stenosis with a mean aortic valve gradient of 16.8 mmHg and a dimensionless index of 0.37.   Echocardiogram was ordered as noted below.     5. Hypertension  The patient has a history of hypertension which appears controlled on exam today.  He should continue his current antihypertensive medications and monitor his blood pressure at home.      6. Dyslipidemia  Lipid panel done 3/11/2025 showed an LDL cholesterol of 54 and triglycerides of 111 while on rosuvastatin 5 mg daily.   Please see lifestyle recommendations below.     7. Dizziness/orthostasis  The previously reported dizziness/lightheadedness when quickly changing positions had improved.   ECG done today showed atrial fibrillation with a heart rate of 52 bpm.    Blood pressure appears controlled on exam today.  Echocardiogram as noted above.  Recent lab works as noted in the HPI.  Echocardiogram was ordered as noted below.   Lab works as noted below will be done in 6 months prior to his next visit.   Patient should follow general recommendations including staying well-hydrated, changing the positions slowly, wearing compression stockings as necessary, and routine exercise.      8. Lower extremity edema  The patient has trace to 1+ pitting bilateral lower extremity edema on exam today.  Recent lab works as noted in the HPI.  Lab works as noted below will be done in 6 months prior to his next visit.    Echocardiogram was ordered as noted below.   I discussed with him the importance of following a low-sodium heart healthy diet, wearing compression stockings and elevating legs when seated.     9. Tiredness/leg fatigue  The patient reports tiredness during the day and states that he feels that his legs get tired easily, although he any new chest pain, shortness of breath, palpitations and lightheadedness.   He states that he snores at night and declined sleep study several times in the past.  Discussed possible referral to sleep medicine as the patient reports a history of sleep apnea which he declined.  BRANT and echocardiogram were ordered as noted below.         Orders:   BRANT  Referral to sleep medicine -patient declined  BMP/CBC/magnesium in 6 months,   Echocardiogram   Follow-up in 6 months.    Lifestyle Recommendations  I recommend a whole-food plant-based diet, an eating pattern that encourages the consumption of unrefined plant foods (such as fruits, vegetables, tubers, whole grains, legumes, nuts and seeds) and discourages meats, dairy products, eggs and processed foods.     The AHA/ACC recommends that the patient consume a dietary pattern that emphasizes intake of vegetables, fruits, and whole grains; includes low-fat dairy products, poultry, fish, legumes, non-tropical vegetable oils, and nuts; and limits intake of sodium, sweets, sugar-sweetened beverages, and red meats.  Adapt this dietary pattern to appropriate calorie requirements (a 500-750 kcal/day deficit to loose weight), personal and cultural food preferences, and nutrition therapy for other medical conditions (including diabetes).  Achieve this pattern by following plans such as the [place] Mediterranean, DASH dietary pattern, or AHA diet.     Engage in 2 hours and 30 minutes per week of moderate-intensity physical activity, or 1 hour and 15 minutes (75 minutes) per week of vigorous-intensity aerobic physical activity, or an equivalent combination  of moderate and vigorous-intensity aerobic physical activity. Aerobic activity should be performed in episodes of at least 10 minutes preferably spread throughout the week.     Adhering to a heart healthy diet, regular exercise habits, avoidance of tobacco products, and maintenance of a healthy weight are crucial components of their heart disease risk reduction.     Any positive review of systems not specifically addressed in the office visit today should be evaluated and treated by the patients primary care physician or in an emergency department if necessary     Patient was notified that results from ordered tests will be called to the patient if it changes current management; it will otherwise be discussed at a future appointment and available on  [employer].     Thank you for allowing me to participate in the care of this patient.        This document was generated using the assistance of voice recognition software. If there are any errors of spelling, grammar, syntax, or meaning; please feel free to contact me directly for clarification.    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    Past Surgical History:  He has a past surgical history that includes Prostate surgery (10/18/2016); Tonsillectomy (10/18/2016); Hernia repair (Right, 07/01/2024); and Anus surgery (N/A, 11/11/2024).      Social History:  He reports that he  "has quit smoking. His smoking use included cigarettes. He has never used smokeless tobacco. He reports that he does not drink alcohol and does not use drugs.    Family History:  Family History  Problem Relation Name Age of Onset    Bone cancer Mother          Spine    Heart attack Father      Cancer Sister      Other (Mesthelioma) Brother      Diabetes Brother          Allergies:  Atorvastatin    Outpatient Medications:  Current Outpatient Medications   Medication Instructions    acetaminophen (TYLENOL) 500 mg, 4 times daily    aMILoride (Midamor) 5 mg tablet 1 tablet, Daily    amLODIPine (Norvasc) 2.5 mg tablet 1 tablet, Daily    blood sugar diagnostic (Accu-Chek Barbara Plus test strp) 1 each, Topical, 3 times daily    cephalexin (KEFLEX) 500 mg, oral, 2 times daily    cholecalciferol (Vitamin D-3) 25 MCG (1000 UT) tablet 3 tablets, Daily    cyanocobalamin (Vitamin B-12) 500 mcg tablet 1 tablet, Daily    insulin lispro protamin-lispro (HumaLOG Mix 75-25) 100 unit/mL (75-25) injection 17 Units, subcutaneous, 2 times daily (morning and late afternoon)    lancets misc To check sugars up to four times daily    lidocaine (Xylocaine) 5 % ointment APPLY THREE TIMES DAILY    loperamide (IMODIUM) 2 mg, 4 times daily PRN    losartan (COZAAR) 100 mg, oral, Daily    magnesium oxide (Mag-Ox) 400 mg (241.3 mg magnesium) tablet 5 tablets, oral, Daily    metFORMIN (GLUCOPHAGE) 500 mg, oral, 3 times daily    multivitamin tablet 1 tablet, Daily    nystatin (Mycostatin) cream 2 times daily    pen needle, diabetic 32 gauge x 5/32\" needle 1 each, 2 times daily    rosuvastatin (CRESTOR) 5 mg, oral, Daily    silver sulfADIAZINE (Silvadene) 1 % cream Topical, 2 times daily    triamcinolone (Kenalog) 0.1 % cream 1 Application, 2 times daily    warfarin (Coumadin) 5 mg tablet Take 7.5mg (1.5 tablets) by mouth on Monday and 5mg (1 tablet) all other days or as directed by Coumadin clinic.        ROS:  A 14 point review of systems was done and " is negative other than as stated in HPI    Vitals:      11/11/2024    10:30 AM 11/26/2024     1:00 PM 12/4/2024     1:42 PM 12/16/2024     3:39 PM 12/27/2024     1:29 PM 1/22/2025    10:56 AM 2/26/2025     3:58 PM   Vitals   Systolic 166  164 126   124   Diastolic 70  71 70   72   BP Location    Left arm      Heart Rate 65  48 53 70 72 86   Resp 15    16  16   Height  1.829 m (6') 1.829 m (6') 1.829 m (6')   1.829 m (6')   Weight (lb)  170 174.6 179.2   177   BMI  23.06 kg/m2 23.68 kg/m2 24.3 kg/m2   24.01 kg/m2   BSA (m2)  1.98 m2 2.01 m2 2.03 m2   2.02 m2        Physical Exam:   Constitutional: Cooperative, in no acute distress, alert, appears stated age.  Skin: Skin color, texture, turgor normal. No rashes or lesions.  Head: Normocephalic. No masses, lesions, tenderness or abnormalities  Eyes: Extraocular movements are grossly intact.  Mouth and throat: Mucous membranes moist  Neck: Neck supple, no carotid bruits, no JVD  Respiratory: Lungs clear to auscultation, no wheezing or rhonchi, no use of accessory muscles  Chest wall: No scars, normal excursion with respiration  Cardiovascular: Irregular, + murmur  Gastrointestinal: Abdomen soft, nontender. Bowel sounds normal.  Musculoskeletal: Strength equal in upper extremities  Extremities: Trace to 1+ pitting bilateral lower extremity edema   Neurologic: Sensation grossly intact, alert and oriented x3    Intake/Output:   No intake/output data recorded.    Outpatient Medications  Current Outpatient Medications on File Prior to Visit   Medication Sig Dispense Refill    acetaminophen (Tylenol) 500 mg tablet Take 1 tablet (500 mg) by mouth 4 times a day.      aMILoride (Midamor) 5 mg tablet Take 1 tablet (5 mg) by mouth once daily.      amLODIPine (Norvasc) 2.5 mg tablet Take 1 tablet (2.5 mg) by mouth once daily.      blood sugar diagnostic (Accu-Chek Barbara Plus test strp) Apply 1 each topically 3 times a day. 300 strip 3    cephalexin (Keflex) 500 mg capsule Take 1  "capsule (500 mg) by mouth 2 times a day. 14 capsule 2    cholecalciferol (Vitamin D-3) 25 MCG (1000 UT) tablet Take 3 tablets (75 mcg) by mouth once daily.      cyanocobalamin (Vitamin B-12) 500 mcg tablet Take 1 tablet (500 mcg) by mouth once daily.      insulin lispro protamin-lispro (HumaLOG Mix 75-25) 100 unit/mL (75-25) injection Inject 17 Units under the skin 2 times daily (morning and late afternoon). 33 mL 0    lancets misc To check sugars up to four times daily 200 each 3    lidocaine (Xylocaine) 5 % ointment APPLY THREE TIMES DAILY      loperamide (Imodium) 2 mg capsule Take 1 capsule (2 mg) by mouth 4 times a day as needed.      losartan (Cozaar) 100 mg tablet Take 1 tablet (100 mg) by mouth once daily. 90 tablet 0    magnesium oxide (Mag-Ox) 400 mg (241.3 mg magnesium) tablet Take 5 tablets (2,000 mg) by mouth once daily.      metFORMIN (Glucophage) 500 mg tablet Take 1 tablet (500 mg) by mouth 3 times a day. 270 tablet 3    multivitamin tablet Take 1 tablet by mouth once daily.      nystatin (Mycostatin) cream twice a day. APPLY A THIN LAYER TO AFFECTED AREA(S) AND RUB IN WELL TWICE DAILY.      pen needle, diabetic 32 gauge x 5/32\" needle 1 each 2 times a day.      rosuvastatin (Crestor) 5 mg tablet Take 1 tablet (5 mg) by mouth once daily. 90 tablet 1    silver sulfADIAZINE (Silvadene) 1 % cream Apply topically 2 times a day. 50 g 3    triamcinolone (Kenalog) 0.1 % cream Apply 1 Application topically 2 times a day.      warfarin (Coumadin) 5 mg tablet Take 7.5mg (1.5 tablets) by mouth on Monday and 5mg (1 tablet) all other days or as directed by Coumadin clinic. 96 tablet 0     No current facility-administered medications on file prior to visit.       Labs: (past 26 weeks)  Recent Results (from the past 26 weeks)   ECG 12 lead (Clinic Performed)    Collection Time: 12/16/24  3:46 PM   Result Value Ref Range    Ventricular Rate 53 BPM    QRS Duration 140 ms    QT Interval 482 ms    QTC " Calculation(Bazett) 452 ms    R Axis 127 degrees    T Axis 6 degrees    QRS Count 9 beats    Q Onset 225 ms    T Offset 466 ms    QTC Fredericia 462 ms   POCT INR manually resulted    Collection Time: 01/13/25  3:05 PM   Result Value Ref Range    POC INR 2.30 2.00 - 3.00    POC Prothrombin Time 27.40 Normal Range: 9.3 - 12.5   Comprehensive Metabolic Panel    Collection Time: 01/13/25  3:18 PM   Result Value Ref Range    Glucose 263 (H) 74 - 99 mg/dL    Sodium 138 136 - 145 mmol/L    Potassium 4.6 3.5 - 5.3 mmol/L    Chloride 101 98 - 107 mmol/L    Bicarbonate 28 21 - 32 mmol/L    Anion Gap 14 10 - 20 mmol/L    Urea Nitrogen 21 6 - 23 mg/dL    Creatinine 1.09 0.50 - 1.30 mg/dL    eGFR 66 >60 mL/min/1.73m*2    Calcium 8.7 8.6 - 10.3 mg/dL    Albumin 4.0 3.4 - 5.0 g/dL    Alkaline Phosphatase 56 33 - 136 U/L    Total Protein 5.8 (L) 6.4 - 8.2 g/dL    AST 23 9 - 39 U/L    Bilirubin, Total 0.5 0.0 - 1.2 mg/dL    ALT 22 10 - 52 U/L   Lipid Panel    Collection Time: 01/13/25  3:18 PM   Result Value Ref Range    Cholesterol 120 0 - 199 mg/dL    HDL-Cholesterol 56.2 mg/dL    Cholesterol/HDL Ratio 2.1     LDL Calculated 19 <=99 mg/dL    VLDL 45 (H) 0 - 40 mg/dL    Triglycerides 223 (H) 0 - 149 mg/dL    Non HDL Cholesterol 64 0 - 149 mg/dL   Magnesium    Collection Time: 01/13/25  3:18 PM   Result Value Ref Range    Magnesium 1.64 1.60 - 2.40 mg/dL   POCT glycosylated hemoglobin (Hb A1C) manually resulted    Collection Time: 02/26/25  5:51 PM   Result Value Ref Range    POC HEMOGLOBIN A1c 7.5 (A) 4.2 - 6.5 %   POCT INR manually resulted    Collection Time: 03/03/25  3:46 PM   Result Value Ref Range    POC INR 2.20 2.00 - 3.00    POC Prothrombin Time 26.80 Normal Range: 9.3 - 12.5   Prostate Specific Antigen    Collection Time: 03/11/25  9:03 AM   Result Value Ref Range    PSA, TOTAL 6.16 (H) < OR = 4.00 ng/mL   CBC and Auto Differential    Collection Time: 03/11/25  9:03 AM   Result Value Ref Range    WHITE BLOOD CELL COUNT  7.7 3.8 - 10.8 Thousand/uL    RED BLOOD CELL COUNT 4.18 (L) 4.20 - 5.80 Million/uL    HEMOGLOBIN 11.7 (L) 13.2 - 17.1 g/dL    HEMATOCRIT 36.9 (L) 38.5 - 50.0 %    MCV 88.3 80.0 - 100.0 fL    MCH 28.0 27.0 - 33.0 pg    MCHC 31.7 (L) 32.0 - 36.0 g/dL    RDW 13.0 11.0 - 15.0 %    PLATELET COUNT 251 140 - 400 Thousand/uL    MPV 11.0 7.5 - 12.5 fL    ABSOLUTE NEUTROPHILS 5,275 1,500 - 7,800 cells/uL    ABSOLUTE LYMPHOCYTES 1,448 850 - 3,900 cells/uL    ABSOLUTE MONOCYTES 724 200 - 950 cells/uL    ABSOLUTE EOSINOPHILS 193 15 - 500 cells/uL    ABSOLUTE BASOPHILS 62 0 - 200 cells/uL    NEUTROPHILS 68.5 %    LYMPHOCYTES 18.8 %    MONOCYTES 9.4 %    EOSINOPHILS 2.5 %    BASOPHILS 0.8 %   Comprehensive Metabolic Panel    Collection Time: 03/11/25  9:03 AM   Result Value Ref Range    GLUCOSE 151 (H) 65 - 99 mg/dL    UREA NITROGEN (BUN) 24 7 - 25 mg/dL    CREATININE 1.12 0.70 - 1.22 mg/dL    EGFR 64 > OR = 60 mL/min/1.73m2    SODIUM 137 135 - 146 mmol/L    POTASSIUM 4.6 3.5 - 5.3 mmol/L    CHLORIDE 101 98 - 110 mmol/L    CARBON DIOXIDE 27 20 - 32 mmol/L    ELECTROLYTE BALANCE 9 7 - 17 mmol/L (calc)    CALCIUM 9.2 8.6 - 10.3 mg/dL    PROTEIN, TOTAL 6.8 6.1 - 8.1 g/dL    ALBUMIN 4.4 3.6 - 5.1 g/dL    BILIRUBIN, TOTAL 0.6 0.2 - 1.2 mg/dL    ALKALINE PHOSPHATASE 47 35 - 144 U/L    AST 19 10 - 35 U/L    ALT 14 9 - 46 U/L   Lipid Panel    Collection Time: 03/11/25  9:03 AM   Result Value Ref Range    CHOLESTEROL, TOTAL 139 <200 mg/dL    HDL CHOLESTEROL 66 > OR = 40 mg/dL    TRIGLYCERIDES 111 <150 mg/dL    LDL-CHOLESTEROL 54 mg/dL (calc)    CHOL/HDLC RATIO 2.1 <5.0 (calc)    NON HDL CHOLESTEROL 73 <130 mg/dL (calc)   TSH with reflex to Free T4 if abnormal    Collection Time: 03/11/25  9:03 AM   Result Value Ref Range    TSH W/REFLEX TO FT4 0.98 0.40 - 4.50 mIU/L   Hemoglobin A1C    Collection Time: 03/11/25  9:03 AM   Result Value Ref Range    HEMOGLOBIN A1c 7.8 (H) <5.7 % of total Hgb    eAG (mg/dL) 177 mg/dL    eAG (mmol/L) 9.8  mmol/L   Albumin-Creatinine Ratio, Urine Random    Collection Time: 03/11/25  9:03 AM   Result Value Ref Range    CREATININE, RANDOM URINE 51 20 - 320 mg/dL    ALBUMIN, URINE 7.2 See Note: mg/dL    ALBUMIN/CREATININE RATIO, RANDOM URINE 141 (H) <30 mg/g creat   POCT INR manually resulted    Collection Time: 04/28/25  2:50 PM   Result Value Ref Range    POC INR 2.00 (A) 2.00 - 3.00    POC Prothrombin Time 24.20 (A) Normal Range: 9.3 - 12.5       ECG  Encounter Date: 12/16/24   ECG 12 lead (Clinic Performed)   Result Value    Ventricular Rate 53    QRS Duration 140    QT Interval 482    QTC Calculation(Bazett) 452    R Axis 127    T Axis 6    QRS Count 9    Q Onset 225    T Offset 466    QTC Fredericia 462    Narrative    Atrial fibrillation with slow ventricular response with a competing junctional pacemaker  Right bundle branch block  Left posterior fascicular block   Bifascicular block   T wave abnormality, consider inferior ischemia  Abnormal ECG  When compared with ECG of 11-NOV-2024 08:10,  PREVIOUS ECG IS PRESENT  Confirmed by Ludin Morgan ([id number]) on 12/16/2024 4:09:50 PM       Echocardiogram  No results found for this or any previous visit from the past 1095 days.      CV Studies:  EKG:  Encounter Date: 12/16/24   ECG 12 lead (Clinic Performed)   Result Value    Ventricular Rate 53    QRS Duration 140    QT Interval 482    QTC Calculation(Bazett) 452    R Axis 127    T Axis 6    QRS Count 9    Q Onset 225    T Offset 466    QTC Fredericia 462    Narrative    Atrial fibrillation with slow ventricular response with a competing junctional pacemaker  Right bundle branch block  Left posterior fascicular block   Bifascicular block   T wave abnormality, consider inferior ischemia  Abnormal ECG  When compared with ECG of 11-NOV-2024 08:10,  PREVIOUS ECG IS PRESENT  Confirmed by Ludin Morgan [id number]) on 12/16/2024 4:09:50 PM     Echocardiogram:   Echocardiogram     Narrative  [address] [place] [address]  Phone [phone] Fax [phone]    TRANSTHORACIC ECHOCARDIOGRAM REPORT      Patient Name:     JINA YOUNG Reading Physician:   [id number] Ludin Morgan   Study Date:       5/28/2021         Referring Physician: [id number] WANG  MRN/PID:          [id number]          PCP:  Accession/Order#: [id number]      Department Location: [facility]  YOB: 1940         Fellow:  Gender:           M                 Nurse:  Admit Date:                         Sonographer:         [place] Delgado [facility]  Admission Status: Outpatient        Additional Staff:  Height:           182.88 cm         CC Report to:  Weight:           80.74 kg          Study Type:          Echocardiogram  BSA:              2.03 m2  Blood Pressure: 168 /90 mmHg    Diagnosis/ICD: I48.0-Paroxysmal atrial fibrillation  Indication:    A-Fib  Procedure/CPT: Echo Complete w Full [id number]  Study Detail: The following Echo studies were performed: 2D, M-Mode, Doppler and  color flow.      PHYSICIAN INTERPRETATION:  Left Ventricle: The left ventricular systolic function is normal, with an estimated ejection fraction of 55-60%. There are no regional wall motion abnormalities. The left ventricular cavity size is normal. There is mild concentric left ventricular hypertrophy. Left ventricular diastolic filling was indeterminate.  Left Atrium: The left atrium is severely dilated.  Right Ventricle: The right ventricle is normal in size. There is normal right ventricular global systolic function.  Right Atrium: The right atrium is mildly dilated.  Aortic Valve: The aortic valve is trileaflet. There is no evidence of aortic valve regurgitation. The mean gradient of the aortic valve is 8.0 mmHg.  Mitral Valve: The mitral valve is normal in structure. There is mild to moderate mitral valve regurgitation.  Tricuspid Valve: The tricuspid valve is structurally normal. There is trace to mild tricuspid  regurgitation.  Pulmonic Valve: The pulmonic valve is structurally normal. There is physiologic pulmonic valve regurgitation.  Pericardium: There is no pericardial effusion noted.  Aorta: The aortic root is normal.      CONCLUSIONS:  1. The left ventricular systolic function is normal with a 55-60% estimated ejection fraction.  2. The left atrium is severely dilated.  3. Mild to moderate mitral valve regurgitation.    QUANTITATIVE DATA SUMMARY:  2D MEASUREMENTS:  Normal Ranges:  IVSd:          1.20 cm    (0.6-1.1cm)  LVPWd:         1.20 cm    (0.6-1.1cm)  LVIDd:         4.60 cm    (3.9-5.9cm)  LVIDs:         2.80 cm  LV Mass Index: 101.1 g/m2  LV % FS        39.1 %    LA VOLUME:  Normal Ranges:  LA Vol A4C:        100.1 ml   (22+/-6mL/m2)  LA Vol A2C:        123.8 ml  LA Vol BP:         113.0 ml  LA Vol Index A4C:  49.4ml/m2  LA Vol Index A2C:  61.0 ml/m2  LA Vol Index BP:   55.7 ml/m2  LA Area A4C:       27.4 cm2  LA Area A2C:       31.0 cm2  LA Major Axis A4C: 6.4 cm  LA Major Axis A2C: 6.6 cm  LA Volume Index:   55.0 ml/m2  LA Vol A4C:        97.0 ml  LA Vol A2C:        117.0 ml    RA VOLUME BY A/L METHOD:  Normal Ranges:  RA Area A4C: 16.0 cm2    M-MODE MEASUREMENTS:  Normal Ranges:  Ao Root: 3.40 cm (2.0-3.7cm)  AoV Exc: 1.60 cm (1.5-2.5cm)  LAs:     4.30 cm (2.7-4.0cm)    AORTA MEASUREMENTS:  Normal Ranges:  AoV Exc:     1.60 cm (1.5-2.5cm)  Ao Sinus, d: 3.20 cm (2.1-3.5cm)  Ao STJ, d:   2.80 cm (1.7-3.4cm)  Asc Ao, d:   3.20 cm (2.1-3.4cm)    LV SYSTOLIC FUNCTION BY 2D PLANIMETRY (MOD):  Normal Ranges:  EF-A4C View: 58.2 % (>55%)  EF-A2C View: 60.6 %  EF-Biplane:  59.3 %    LV DIASTOLIC FUNCTION:  Normal Ranges:  MV Peak E:    1.38 m/s    (0.7-1.2 m/s)  MV Peak A:    0.26 m/s    (0.42-0.7 m/s)  E/A Ratio:    5.21        (1.0-2.2)  MV e'         0.12 m/s    (>8.0)  MV lateral e' 0.12 m/s  MV medial e'  0.11 m/s  MV A Dur:     111.00 msec  E/e' Ratio:   12.00       (<8.0)  LV IVRT:      53 msec      (<100ms)    MITRAL VALVE:  Normal Ranges:  MV DT: 211 msec (150-240msec)    MITRAL INSUFFICIENCY:  Normal Ranges:  PISA Radius:  0.5 cm  MR VTI:       182.00 cm  MR Vmax:      544.00 cm/s  MR Alias Andrea: 30.8 cm/s  MR Volume:    16.19 ml  MR Flow Rt:   48.38 ml/s  MR EROA:      0.09 cm2    AORTIC VALVE:  Normal Ranges:  AoV Mean P.0 mmHg (1.7-11.5mmHg)  LVOT Max Andrea:            0.95 m/s (<1.1m/s)  AoV VTI:                 40.60 cm (18-25cm)  LVOT VTI:                20.60 cm  LVOT Diameter:           2.00 cm  (1.8-2.4cm)  AoV Area, VTI:           1.59 cm2 (2.5-5.5cm2)  AoV Dimensionless Index: 0.51    RIGHT VENTRICLE:  RV 1   3.6 cm  RV 2   2.0 cm  RV 3   8.1 cm  TAPSE: 22.0 mm  RV s'  0.17 m/s    TRICUSPID VALVE/RVSP:  Normal Ranges:  Peak TR Velocity: 2.80 m/s  RV Syst Pressure: 34.4 mmHg (< 30mmHg)  TV E Vmax:        0.62 m/s  (0.3-0.7m/s)  TV A Vmax:        0.44 m/s  IVC Diam:         2.10 cm      [id number] Ludin Morgan DO  Electronically signed on 2021 at 3:05:49 PM         Final     Stress Testing IMESULT([id number]): No results found for this or any previous visit from the past 1825 days.    Cardiac Catheterization: No results found for this or any previous visit from the past 1825 days.  No results found for this or any previous visit from the past 3650 days.     Cardiac Scoring: No results found for this or any previous visit from the past 1825 days.    AAA : No results found for this or any previous visit from the past 1825 days.    OTHER: No results found for this or any previous visit from the past 1825 days.    LAST IMAGING RESULTS  ECG 12 lead (Clinic Performed)  Atrial fibrillation with slow ventricular response with a competing junctional pacemaker  Right bundle branch block  Left posterior fascicular block   Bifascicular block   T wave abnormality, consider inferior ischemia  Abnormal ECG  When compared with ECG of 2024 08:10,  PREVIOUS ECG IS PRESENT  Confirmed by Pace,  Ludin ([id number]) on 12/16/2024 4:09:50 PM      Problem List Items Addressed This Visit       AIVR (accelerated idioventricular rhythm) (Multi)    Benign hypertension    Hyperlipidemia    Orthostatic dizziness    Persistent atrial fibrillation (Multi) - Primary    Warfarin anticoagulation    Nonrheumatic aortic (valve) stenosis    Bilateral lower extremity edema          Ludin Morgan DO, FACC, FACOI

## 2025-06-20 ENCOUNTER — APPOINTMENT (OUTPATIENT)
Dept: CARDIOLOGY | Facility: HOSPITAL | Age: 85
End: 2025-06-20
Payer: MEDICARE

## 2025-06-23 ENCOUNTER — ANTICOAGULATION - WARFARIN VISIT (OUTPATIENT)
Dept: PHARMACY | Facility: HOSPITAL | Age: 85
End: 2025-06-23
Payer: MEDICARE

## 2025-06-23 DIAGNOSIS — I48.19 PERSISTENT ATRIAL FIBRILLATION (MULTI): ICD-10-CM

## 2025-06-23 DIAGNOSIS — I48.0 PAROXYSMAL ATRIAL FIBRILLATION (MULTI): Primary | ICD-10-CM

## 2025-06-23 LAB
POC INR: 2.8 (ref 2–3)
POC PROTHROMBIN TIME: 33.9 (ref 9.3–12.5)

## 2025-06-23 PROCEDURE — 99211 OFF/OP EST MAY X REQ PHY/QHP: CPT

## 2025-06-23 PROCEDURE — 85610 PROTHROMBIN TIME: CPT | Mod: QW | Performed by: INTERNAL MEDICINE

## 2025-06-23 RX ORDER — WARFARIN SODIUM 5 MG/1
TABLET ORAL
Qty: 96 TABLET | Refills: 0 | Status: SHIPPED | OUTPATIENT
Start: 2025-06-23

## 2025-06-23 NOTE — PATIENT INSTRUCTIONS
Your INR today is in range at 2.8. Please continue your regimen of 7.5mg on Monday and 5 mg all other days. We will follow up in 8 weeks.  If any questions arise do not hesitate to call us at [phone] M-F from 8:30am-4:30pm or at   [phone] after 5pm or on the weekends. Continue to monitor for any excess bleeding or bruising, especially for blood in your stool.

## 2025-06-23 NOTE — PROGRESS NOTES
Mr. Lauren is a referral from Dr. Morgan for warfarin management of Afib (goal 2-3). He denies any changes in health. Patient enjoys broccoli. He denies any missed or extra doses. No signs or symptoms of bleeding reported. He normally takes 4 tablets of acetaminophen a day.  No changes in his diet. He exercises 5 times a week. He has been lifting his weights. His INR is in range at 2.8 today, we will continue his warfarin of 7.5mg on Monday and 5mg all other days.  He has been very stable on this regimen. We will follow up in 8 weeks.

## 2025-06-27 ENCOUNTER — OFFICE VISIT (OUTPATIENT)
Dept: CARDIOLOGY | Facility: HOSPITAL | Age: 85
End: 2025-06-27
Payer: MEDICARE

## 2025-06-27 VITALS
DIASTOLIC BLOOD PRESSURE: 72 MMHG | BODY MASS INDEX: 23.98 KG/M2 | SYSTOLIC BLOOD PRESSURE: 124 MMHG | HEIGHT: 72 IN | WEIGHT: 177 LBS | HEART RATE: 52 BPM

## 2025-06-27 DIAGNOSIS — I35.0 NONRHEUMATIC AORTIC (VALVE) STENOSIS: ICD-10-CM

## 2025-06-27 DIAGNOSIS — I44.2 AIVR (ACCELERATED IDIOVENTRICULAR RHYTHM) (MULTI): ICD-10-CM

## 2025-06-27 DIAGNOSIS — I10 BENIGN HYPERTENSION: ICD-10-CM

## 2025-06-27 DIAGNOSIS — R09.89 OTHER SPECIFIED SYMPTOMS AND SIGNS INVOLVING THE CIRCULATORY AND RESPIRATORY SYSTEMS: ICD-10-CM

## 2025-06-27 DIAGNOSIS — R42 ORTHOSTATIC DIZZINESS: ICD-10-CM

## 2025-06-27 DIAGNOSIS — Z79.01 WARFARIN ANTICOAGULATION: ICD-10-CM

## 2025-06-27 DIAGNOSIS — R60.0 BILATERAL LOWER EXTREMITY EDEMA: ICD-10-CM

## 2025-06-27 DIAGNOSIS — E78.00 PURE HYPERCHOLESTEROLEMIA: ICD-10-CM

## 2025-06-27 DIAGNOSIS — R29.898 WEAKNESS OF BOTH LOWER EXTREMITIES: ICD-10-CM

## 2025-06-27 DIAGNOSIS — I48.19 PERSISTENT ATRIAL FIBRILLATION (MULTI): Primary | ICD-10-CM

## 2025-06-27 LAB
Q ONSET: 222 MS
QRS COUNT: 9 BEATS
QRS DURATION: 144 MS
QT INTERVAL: 474 MS
QTC CALCULATION(BAZETT): 440 MS
QTC FREDERICIA: 451 MS
R AXIS: 47 DEGREES
T AXIS: -1 DEGREES
T OFFSET: 459 MS
VENTRICULAR RATE: 52 BPM

## 2025-06-27 PROCEDURE — 93005 ELECTROCARDIOGRAM TRACING: CPT | Performed by: INTERNAL MEDICINE

## 2025-06-27 PROCEDURE — 1159F MED LIST DOCD IN RCRD: CPT | Performed by: INTERNAL MEDICINE

## 2025-06-27 PROCEDURE — 3074F SYST BP LT 130 MM HG: CPT | Performed by: INTERNAL MEDICINE

## 2025-06-27 PROCEDURE — 3078F DIAST BP <80 MM HG: CPT | Performed by: INTERNAL MEDICINE

## 2025-06-27 PROCEDURE — 99212 OFFICE O/P EST SF 10 MIN: CPT | Mod: 25 | Performed by: INTERNAL MEDICINE

## 2025-06-27 PROCEDURE — 1036F TOBACCO NON-USER: CPT | Performed by: INTERNAL MEDICINE

## 2025-06-27 PROCEDURE — 93010 ELECTROCARDIOGRAM REPORT: CPT | Performed by: INTERNAL MEDICINE

## 2025-06-27 PROCEDURE — 99214 OFFICE O/P EST MOD 30 MIN: CPT | Performed by: INTERNAL MEDICINE

## 2025-08-20 ENCOUNTER — TELEPHONE (OUTPATIENT)
Dept: CARDIOLOGY | Facility: HOSPITAL | Age: 85
End: 2025-08-20
Payer: MEDICARE

## 2025-08-21 ENCOUNTER — RESULTS FOLLOW-UP (OUTPATIENT)
Dept: CARDIOLOGY | Facility: HOSPITAL | Age: 85
End: 2025-08-21

## 2025-08-21 ENCOUNTER — HOSPITAL ENCOUNTER (OUTPATIENT)
Dept: CARDIOLOGY | Facility: HOSPITAL | Age: 85
Discharge: HOME | End: 2025-08-21
Payer: MEDICARE

## 2025-08-21 ENCOUNTER — HOSPITAL ENCOUNTER (OUTPATIENT)
Dept: VASCULAR MEDICINE | Facility: HOSPITAL | Age: 85
Discharge: HOME | End: 2025-08-21
Payer: MEDICARE

## 2025-08-21 DIAGNOSIS — R09.89 OTHER SPECIFIED SYMPTOMS AND SIGNS INVOLVING THE CIRCULATORY AND RESPIRATORY SYSTEMS: ICD-10-CM

## 2025-08-21 DIAGNOSIS — Z79.01 WARFARIN ANTICOAGULATION: ICD-10-CM

## 2025-08-21 DIAGNOSIS — R60.0 BILATERAL LOWER EXTREMITY EDEMA: ICD-10-CM

## 2025-08-21 DIAGNOSIS — I48.19 PERSISTENT ATRIAL FIBRILLATION (MULTI): ICD-10-CM

## 2025-08-21 DIAGNOSIS — R42 ORTHOSTATIC DIZZINESS: ICD-10-CM

## 2025-08-21 DIAGNOSIS — I44.2 AIVR (ACCELERATED IDIOVENTRICULAR RHYTHM) (MULTI): ICD-10-CM

## 2025-08-21 DIAGNOSIS — R29.898 WEAKNESS OF BOTH LOWER EXTREMITIES: ICD-10-CM

## 2025-08-21 DIAGNOSIS — I35.0 NONRHEUMATIC AORTIC (VALVE) STENOSIS: ICD-10-CM

## 2025-08-21 DIAGNOSIS — E78.00 PURE HYPERCHOLESTEROLEMIA: ICD-10-CM

## 2025-08-21 DIAGNOSIS — I73.9 PERIPHERAL VASCULAR DISEASE, UNSPECIFIED: ICD-10-CM

## 2025-08-21 DIAGNOSIS — I10 BENIGN HYPERTENSION: ICD-10-CM

## 2025-08-21 LAB
AORTIC VALVE MEAN GRADIENT: 46 MMHG
AORTIC VALVE PEAK VELOCITY: 4.28 M/S
AV PEAK GRADIENT: 73 MMHG
AVA (PEAK VEL): 0.89 CM2
AVA (VTI): 0.87 CM2
EJECTION FRACTION APICAL 4 CHAMBER: 61
EJECTION FRACTION: 58 %
GLOBAL LONGITUDINAL STRAIN: 17.5 %
LEFT ATRIUM VOLUME AREA LENGTH INDEX BSA: 50.1 ML/M2
LEFT VENTRICLE INTERNAL DIMENSION DIASTOLE: 5.37 CM (ref 3.5–6)
LEFT VENTRICULAR OUTFLOW TRACT DIAMETER: 1.96 CM
LV EJECTION FRACTION BIPLANE: 54 %
MITRAL VALVE E/A RATIO: 2.4
MITRAL VALVE E/E' RATIO: 10.18
RIGHT VENTRICLE FREE WALL PEAK S': 14.01 CM/S
RIGHT VENTRICLE PEAK SYSTOLIC PRESSURE: 52 MMHG
TRICUSPID ANNULAR PLANE SYSTOLIC EXCURSION: 2.5 CM

## 2025-08-21 PROCEDURE — 93922 UPR/L XTREMITY ART 2 LEVELS: CPT | Performed by: INTERNAL MEDICINE

## 2025-08-21 PROCEDURE — 93922 UPR/L XTREMITY ART 2 LEVELS: CPT

## 2025-08-21 PROCEDURE — 93306 TTE W/DOPPLER COMPLETE: CPT | Performed by: INTERNAL MEDICINE

## 2025-08-21 PROCEDURE — 93306 TTE W/DOPPLER COMPLETE: CPT

## 2025-08-25 ENCOUNTER — ANTICOAGULATION - WARFARIN VISIT (OUTPATIENT)
Dept: PHARMACY | Facility: HOSPITAL | Age: 85
End: 2025-08-25
Payer: MEDICARE

## 2025-08-25 DIAGNOSIS — I48.0 PAROXYSMAL ATRIAL FIBRILLATION (MULTI): Primary | ICD-10-CM

## 2025-08-25 LAB
POC INR: 2.9 (ref 2–3)
POC PROTHROMBIN TIME: 35.1 (ref 9.3–12.5)

## 2025-08-25 PROCEDURE — 85610 PROTHROMBIN TIME: CPT | Mod: QW | Performed by: INTERNAL MEDICINE

## 2025-08-25 PROCEDURE — 99211 OFF/OP EST MAY X REQ PHY/QHP: CPT

## 2025-08-28 ENCOUNTER — APPOINTMENT (OUTPATIENT)
Dept: PRIMARY CARE | Facility: CLINIC | Age: 85
End: 2025-08-28
Payer: MEDICARE

## 2025-08-28 VITALS
HEART RATE: 77 BPM | SYSTOLIC BLOOD PRESSURE: 104 MMHG | DIASTOLIC BLOOD PRESSURE: 52 MMHG | BODY MASS INDEX: 23.65 KG/M2 | RESPIRATION RATE: 16 BRPM | HEIGHT: 72 IN | OXYGEN SATURATION: 97 % | WEIGHT: 174.6 LBS

## 2025-08-28 DIAGNOSIS — Z79.4 TYPE 2 DIABETES MELLITUS WITH DIABETIC POLYNEUROPATHY, WITH LONG-TERM CURRENT USE OF INSULIN: ICD-10-CM

## 2025-08-28 DIAGNOSIS — I48.0 PAROXYSMAL ATRIAL FIBRILLATION (MULTI): ICD-10-CM

## 2025-08-28 DIAGNOSIS — E11.42 TYPE 2 DIABETES MELLITUS WITH DIABETIC POLYNEUROPATHY, WITH LONG-TERM CURRENT USE OF INSULIN: ICD-10-CM

## 2025-08-28 DIAGNOSIS — E78.2 MIXED HYPERLIPIDEMIA: ICD-10-CM

## 2025-08-28 DIAGNOSIS — N18.2 CKD (CHRONIC KIDNEY DISEASE) STAGE 2, GFR 60-89 ML/MIN: ICD-10-CM

## 2025-08-28 DIAGNOSIS — I10 ESSENTIAL HYPERTENSION: ICD-10-CM

## 2025-08-28 DIAGNOSIS — N40.1 BENIGN PROSTATIC HYPERPLASIA WITH NOCTURIA: ICD-10-CM

## 2025-08-28 DIAGNOSIS — I10 BENIGN HYPERTENSION: ICD-10-CM

## 2025-08-28 DIAGNOSIS — E11.9 TYPE 2 DIABETES MELLITUS WITHOUT COMPLICATION, WITH LONG-TERM CURRENT USE OF INSULIN: ICD-10-CM

## 2025-08-28 DIAGNOSIS — R35.1 BENIGN PROSTATIC HYPERPLASIA WITH NOCTURIA: ICD-10-CM

## 2025-08-28 DIAGNOSIS — Z79.4 TYPE 2 DIABETES MELLITUS WITHOUT COMPLICATION, WITH LONG-TERM CURRENT USE OF INSULIN: ICD-10-CM

## 2025-08-28 DIAGNOSIS — Z79.01 WARFARIN ANTICOAGULATION: ICD-10-CM

## 2025-08-28 DIAGNOSIS — I44.2 AIVR (ACCELERATED IDIOVENTRICULAR RHYTHM) (MULTI): ICD-10-CM

## 2025-08-28 DIAGNOSIS — I35.0 NONRHEUMATIC AORTIC VALVE STENOSIS: Primary | ICD-10-CM

## 2025-08-28 LAB — POC HEMOGLOBIN A1C: 6.8 % (ref 4.2–6.5)

## 2025-08-28 PROCEDURE — G2211 COMPLEX E/M VISIT ADD ON: HCPCS | Performed by: FAMILY MEDICINE

## 2025-08-28 PROCEDURE — 1126F AMNT PAIN NOTED NONE PRSNT: CPT | Performed by: FAMILY MEDICINE

## 2025-08-28 PROCEDURE — 3078F DIAST BP <80 MM HG: CPT | Performed by: FAMILY MEDICINE

## 2025-08-28 PROCEDURE — 99214 OFFICE O/P EST MOD 30 MIN: CPT | Performed by: FAMILY MEDICINE

## 2025-08-28 PROCEDURE — 1036F TOBACCO NON-USER: CPT | Performed by: FAMILY MEDICINE

## 2025-08-28 PROCEDURE — 3074F SYST BP LT 130 MM HG: CPT | Performed by: FAMILY MEDICINE

## 2025-08-28 PROCEDURE — 83036 HEMOGLOBIN GLYCOSYLATED A1C: CPT | Performed by: FAMILY MEDICINE

## 2025-08-28 RX ORDER — INSULIN LISPRO 100 [IU]/ML
17 INJECTION, SUSPENSION SUBCUTANEOUS
Qty: 33 ML | Refills: 3 | Status: SHIPPED | OUTPATIENT
Start: 2025-08-28 | End: 2026-08-28

## 2025-08-28 RX ORDER — LOSARTAN POTASSIUM 100 MG/1
100 TABLET ORAL DAILY
Qty: 90 TABLET | Refills: 3 | Status: SHIPPED | OUTPATIENT
Start: 2025-08-28

## 2025-08-28 ASSESSMENT — ANXIETY QUESTIONNAIRES
3. WORRYING TOO MUCH ABOUT DIFFERENT THINGS: NOT AT ALL
7. FEELING AFRAID AS IF SOMETHING AWFUL MIGHT HAPPEN: NOT AT ALL
1. FEELING NERVOUS, ANXIOUS, OR ON EDGE: NOT AT ALL
4. TROUBLE RELAXING: NOT AT ALL
5. BEING SO RESTLESS THAT IT IS HARD TO SIT STILL: NOT AT ALL
2. NOT BEING ABLE TO STOP OR CONTROL WORRYING: NOT AT ALL
GAD7 TOTAL SCORE: 0
6. BECOMING EASILY ANNOYED OR IRRITABLE: NOT AT ALL

## 2025-08-28 ASSESSMENT — PATIENT HEALTH QUESTIONNAIRE - PHQ9
1. LITTLE INTEREST OR PLEASURE IN DOING THINGS: NOT AT ALL
2. FEELING DOWN, DEPRESSED OR HOPELESS: NOT AT ALL
SUM OF ALL RESPONSES TO PHQ9 QUESTIONS 1 AND 2: 0

## 2025-08-28 ASSESSMENT — PAIN SCALES - GENERAL: PAINLEVEL_OUTOF10: 0-NO PAIN

## 2025-08-28 ASSESSMENT — ENCOUNTER SYMPTOMS
DEPRESSION: 0
SHORTNESS OF BREATH: 1
FATIGUE: 1
OCCASIONAL FEELINGS OF UNSTEADINESS: 0
LOSS OF SENSATION IN FEET: 0

## 2025-09-04 DIAGNOSIS — I48.19 PERSISTENT ATRIAL FIBRILLATION (MULTI): ICD-10-CM

## 2025-09-04 DIAGNOSIS — I48.0 PAROXYSMAL ATRIAL FIBRILLATION (MULTI): ICD-10-CM

## 2025-09-05 RX ORDER — WARFARIN SODIUM 5 MG/1
TABLET ORAL
Qty: 96 TABLET | Refills: 3 | Status: SHIPPED | OUTPATIENT
Start: 2025-09-05

## 2025-12-15 ENCOUNTER — APPOINTMENT (OUTPATIENT)
Dept: CARDIOLOGY | Facility: HOSPITAL | Age: 85
End: 2025-12-15
Payer: MEDICARE

## 2025-12-31 ENCOUNTER — APPOINTMENT (OUTPATIENT)
Dept: CARDIOLOGY | Facility: HOSPITAL | Age: 85
End: 2025-12-31
Payer: MEDICARE

## 2026-03-03 ENCOUNTER — APPOINTMENT (OUTPATIENT)
Dept: PRIMARY CARE | Facility: CLINIC | Age: 86
End: 2026-03-03
Payer: MEDICARE

## 2026-03-16 ENCOUNTER — APPOINTMENT (OUTPATIENT)
Dept: PRIMARY CARE | Facility: CLINIC | Age: 86
End: 2026-03-16
Payer: MEDICARE

## (undated) DEVICE — SYRINGE, 10 CC, LUER LOCK

## (undated) DEVICE — SUTURE, VICRYL, 0, 27 IN, UR-6, VIOLET

## (undated) DEVICE — SOLUTION, IRRIGATION, SODIUM CHLORIDE 0.9%, 1000 ML, POUR BOTTLE

## (undated) DEVICE — PREP TRAY, SKIN, DRY, W/GLOVES

## (undated) DEVICE — TRAY, SURESTEP, URINE METER, 16FR, COMPLETE, W/STATLOCK

## (undated) DEVICE — SYRINGE, 50 CC, LUER LOCK

## (undated) DEVICE — Device

## (undated) DEVICE — PANTY, MESH, X-LARGE, GREEN

## (undated) DEVICE — PREP, SCRUB, SKIN, FOAM, HIBICLENS, 4 OZ

## (undated) DEVICE — TROCAR, KII OPTICAL BLADELESS 5MM Z THREAD 100MM LNGTH

## (undated) DEVICE — SUTURE, NUROLON, 0, 18 IN, CT1, DETACHABLE, MULTIPACK, BLACK

## (undated) DEVICE — SUTURE, PROLENE, 2-0, 30 IN, SH, BLUE

## (undated) DEVICE — SUTURE, VICRYL, 0, 27 IN, CT-2, UNDYED

## (undated) DEVICE — STAPLER, ABSORBABLE SORBAFIX 30

## (undated) DEVICE — TROCAR, OPTICAL, BLADELESS, 12MM, THREADED, 100MM LENGTH

## (undated) DEVICE — CABLE, ELECTROSURGICAL, MONOPOLAR, LAPAROSCOPIC, 10 FT, LF

## (undated) DEVICE — PAD, GROUNDING, ELECTROSURGICAL, W/9 FT CABLE, POLYHESIVE II, ADULT, LF

## (undated) DEVICE — SUTURE, VICRYL, 4-0, 18 IN, PS2, UNDYED

## (undated) DEVICE — TROCAR SYSTEM, BALLOON, KII GELPORT, 12 X 100MM

## (undated) DEVICE — DRESSING, ABDOMINAL, WET PRUF, TENDERSORB, 5 X 9 IN, STERILE

## (undated) DEVICE — GLOVE, PROTEXIS PI CLASSIC, SZ-7.0, PF, LF

## (undated) DEVICE — COVER HANDLE LIGHT, STERIS, BLUE, STERILE

## (undated) DEVICE — STRIP, SKIN CLOSURE, STERI STRIP, REINFORCED, 0.5 X 4 IN

## (undated) DEVICE — TOWEL PACK, STERILE, 4/PACK, BLUE

## (undated) DEVICE — SYRINGE, 30 CC, LUER LOCK

## (undated) DEVICE — NEEDLE, HYPODERMIC, 23 GA X 1.5 IN

## (undated) DEVICE — LUBRICANT, WATER SOLUBLE, BACTERIOSTATIC, 2 OZ, STERILE

## (undated) DEVICE — SCOPE WARMER, LAPAROSCOPE, BAG ONLY, LF

## (undated) DEVICE — BANDAGE, ADHESIVE, FLEXIBLE FABRIC, 1 X 3

## (undated) DEVICE — NEEDLE, HYPODERMIC, REGULAR WALL, REGULAR BEVEL, 22 G X 1.5 IN

## (undated) DEVICE — PAD, SANITARY, OBSTETRICAL, W/ADHSV STRIP,11 IN,LF